# Patient Record
Sex: MALE | Race: BLACK OR AFRICAN AMERICAN | NOT HISPANIC OR LATINO | URBAN - METROPOLITAN AREA
[De-identification: names, ages, dates, MRNs, and addresses within clinical notes are randomized per-mention and may not be internally consistent; named-entity substitution may affect disease eponyms.]

---

## 2018-04-29 ENCOUNTER — EMERGENCY (EMERGENCY)
Facility: HOSPITAL | Age: 73
LOS: 1 days | Discharge: HOME | End: 2018-04-29
Attending: EMERGENCY MEDICINE | Admitting: EMERGENCY MEDICINE
Payer: MEDICARE

## 2018-04-29 VITALS
SYSTOLIC BLOOD PRESSURE: 145 MMHG | OXYGEN SATURATION: 100 % | DIASTOLIC BLOOD PRESSURE: 86 MMHG | RESPIRATION RATE: 16 BRPM | HEART RATE: 85 BPM

## 2018-04-29 VITALS — RESPIRATION RATE: 16 BRPM | TEMPERATURE: 99 F

## 2018-04-29 DIAGNOSIS — Z98.89 OTHER SPECIFIED POSTPROCEDURAL STATES: Chronic | ICD-10-CM

## 2018-04-29 PROCEDURE — 73030 X-RAY EXAM OF SHOULDER: CPT | Mod: 26,LT

## 2018-04-29 PROCEDURE — 73090 X-RAY EXAM OF FOREARM: CPT | Mod: 26,LT

## 2018-04-29 PROCEDURE — 73080 X-RAY EXAM OF ELBOW: CPT | Mod: 26,LT

## 2018-04-29 PROCEDURE — 99284 EMERGENCY DEPT VISIT MOD MDM: CPT | Mod: 25

## 2018-04-29 PROCEDURE — 93010 ELECTROCARDIOGRAM REPORT: CPT

## 2018-04-29 PROCEDURE — 73060 X-RAY EXAM OF HUMERUS: CPT | Mod: 26,LT

## 2018-04-29 RX ORDER — ACETAMINOPHEN 500 MG
650 TABLET ORAL ONCE
Qty: 0 | Refills: 0 | Status: COMPLETED | OUTPATIENT
Start: 2018-04-29 | End: 2018-04-29

## 2018-04-29 RX ORDER — IBUPROFEN 200 MG
400 TABLET ORAL ONCE
Qty: 0 | Refills: 0 | Status: COMPLETED | OUTPATIENT
Start: 2018-04-29 | End: 2018-04-29

## 2018-04-29 RX ADMIN — Medication 400 MILLIGRAM(S): at 11:27

## 2018-04-29 RX ADMIN — Medication 650 MILLIGRAM(S): at 11:27

## 2018-04-29 NOTE — ED ADULT TRIAGE NOTE - CHIEF COMPLAINT QUOTE
Pt c/o of left arm pain x 3 weeks states only has pain with movement.  Pt denies chest pain sob or back pain. PMH HTN

## 2018-04-29 NOTE — ED PROVIDER NOTE - CARE PLAN
Principal Discharge DX:	Extremity pain  Assessment and plan of treatment:	PLEASE CONTINUE TAKING PAIN MEDICATIONS PRESCRIBED TO YOU BY YOUR DOCTOR, FOLLOW UP WITH ORTHOPEDIST AT THE EARLIEST OPENING.

## 2018-04-29 NOTE — ED PROVIDER NOTE - PLAN OF CARE
PLEASE CONTINUE TAKING PAIN MEDICATIONS PRESCRIBED TO YOU BY YOUR DOCTOR, FOLLOW UP WITH ORTHOPEDIST AT THE EARLIEST OPENING.

## 2018-04-29 NOTE — ED PROVIDER NOTE - PROGRESS NOTE DETAILS
XANDER Rose RW - received pt s/p Quids eval by Dr bush - Lt UE pain upper arm) x several weeks, no cp, sob, no trauma, no numbness/weakness/neck pain, xrays and ekg pending, if neg will dc with ortho f/u. XANDER Rose - called pt's pcp's office (Dr Rosen) to enquire about ekg chnages, message left for callback. XANDER Rose - called pt's pcp's office (Dr Rosen) to enquire about ekg changes (age indeterminate T wave inversions, message left for callback. Pt advised about the EKG, has absolutely no cp, sob, will advise to f/u with pcp and provide with the copy of ekg.

## 2018-04-29 NOTE — ED PROVIDER NOTE - OBJECTIVE STATEMENT
72y M with PMHx of DM and HTN presents to the ED with left arm pain x 3-4 weeks. Pt c/o pain when moving his left arm for the past 2-3 weeks. Pt has seen his PMD for this and was given pain medication but did not have XR. Pt admits numbness in fingers at times. Did not take pain medications. No cp, no other complaints. Medications: Novolog, Losartan, Tresiba, Atenolol, Hydralazine, Cartia, Allopurinol, Tamulsin, Atorvastatin, Aspirin, and Olopatadine.

## 2018-04-29 NOTE — ED PROVIDER NOTE - MEDICAL DECISION MAKING DETAILS
72y M with left arm pain. Possibly musculoskeletal strain vs radiculopathy. XRs, pain medication, and outpatient follow up with PMD.

## 2019-10-06 NOTE — ED PROVIDER NOTE - NS ED ATTENDING STATEMENT MOD
Radial band applied to the right radial artery. 13cc's air in band  I have personally performed a face to face diagnostic evaluation on this patient. I have reviewed the ACP note and agree with the history, exam and plan of care, except as noted.

## 2020-09-19 ENCOUNTER — EMERGENCY (EMERGENCY)
Facility: HOSPITAL | Age: 75
LOS: 1 days | Discharge: HOME | End: 2020-09-19
Attending: EMERGENCY MEDICINE | Admitting: EMERGENCY MEDICINE
Payer: MEDICARE

## 2020-09-19 VITALS
TEMPERATURE: 98 F | HEART RATE: 85 BPM | DIASTOLIC BLOOD PRESSURE: 107 MMHG | OXYGEN SATURATION: 100 % | RESPIRATION RATE: 18 BRPM | SYSTOLIC BLOOD PRESSURE: 199 MMHG

## 2020-09-19 DIAGNOSIS — Z98.89 OTHER SPECIFIED POSTPROCEDURAL STATES: Chronic | ICD-10-CM

## 2020-09-19 LAB
ANION GAP SERPL CALC-SCNC: 16 MMO/L — HIGH (ref 7–14)
APPEARANCE UR: CLEAR — SIGNIFICANT CHANGE UP
BACTERIA # UR AUTO: NEGATIVE — SIGNIFICANT CHANGE UP
BASOPHILS # BLD AUTO: 0.03 K/UL — SIGNIFICANT CHANGE UP (ref 0–0.2)
BASOPHILS NFR BLD AUTO: 0.6 % — SIGNIFICANT CHANGE UP (ref 0–2)
BILIRUB UR-MCNC: NEGATIVE — SIGNIFICANT CHANGE UP
BLOOD UR QL VISUAL: NEGATIVE — SIGNIFICANT CHANGE UP
BUN SERPL-MCNC: 39 MG/DL — HIGH (ref 7–23)
CALCIUM SERPL-MCNC: 9.8 MG/DL — SIGNIFICANT CHANGE UP (ref 8.4–10.5)
CHLORIDE SERPL-SCNC: 102 MMOL/L — SIGNIFICANT CHANGE UP (ref 98–107)
CO2 SERPL-SCNC: 21 MMOL/L — LOW (ref 22–31)
COLOR SPEC: SIGNIFICANT CHANGE UP
CREAT SERPL-MCNC: 2.13 MG/DL — HIGH (ref 0.5–1.3)
EOSINOPHIL # BLD AUTO: 0.05 K/UL — SIGNIFICANT CHANGE UP (ref 0–0.5)
EOSINOPHIL NFR BLD AUTO: 1 % — SIGNIFICANT CHANGE UP (ref 0–6)
GLUCOSE SERPL-MCNC: 160 MG/DL — HIGH (ref 70–99)
GLUCOSE UR-MCNC: NEGATIVE — SIGNIFICANT CHANGE UP
HBA1C BLD-MCNC: 8.3 % — HIGH (ref 4–5.6)
HCT VFR BLD CALC: 38.3 % — LOW (ref 39–50)
HGB BLD-MCNC: 11.9 G/DL — LOW (ref 13–17)
HYALINE CASTS # UR AUTO: NEGATIVE — SIGNIFICANT CHANGE UP
IMM GRANULOCYTES NFR BLD AUTO: 0.4 % — SIGNIFICANT CHANGE UP (ref 0–1.5)
KETONES UR-MCNC: NEGATIVE — SIGNIFICANT CHANGE UP
LEUKOCYTE ESTERASE UR-ACNC: NEGATIVE — SIGNIFICANT CHANGE UP
LYMPHOCYTES # BLD AUTO: 0.89 K/UL — LOW (ref 1–3.3)
LYMPHOCYTES # BLD AUTO: 18 % — SIGNIFICANT CHANGE UP (ref 13–44)
MCHC RBC-ENTMCNC: 27.2 PG — SIGNIFICANT CHANGE UP (ref 27–34)
MCHC RBC-ENTMCNC: 31.1 % — LOW (ref 32–36)
MCV RBC AUTO: 87.4 FL — SIGNIFICANT CHANGE UP (ref 80–100)
MONOCYTES # BLD AUTO: 0.4 K/UL — SIGNIFICANT CHANGE UP (ref 0–0.9)
MONOCYTES NFR BLD AUTO: 8.1 % — SIGNIFICANT CHANGE UP (ref 2–14)
NEUTROPHILS # BLD AUTO: 3.56 K/UL — SIGNIFICANT CHANGE UP (ref 1.8–7.4)
NEUTROPHILS NFR BLD AUTO: 71.9 % — SIGNIFICANT CHANGE UP (ref 43–77)
NITRITE UR-MCNC: NEGATIVE — SIGNIFICANT CHANGE UP
NRBC # FLD: 0 K/UL — SIGNIFICANT CHANGE UP (ref 0–0)
PH UR: 6 — SIGNIFICANT CHANGE UP (ref 5–8)
PLATELET # BLD AUTO: 253 K/UL — SIGNIFICANT CHANGE UP (ref 150–400)
PMV BLD: 11.2 FL — SIGNIFICANT CHANGE UP (ref 7–13)
POTASSIUM SERPL-MCNC: 4.4 MMOL/L — SIGNIFICANT CHANGE UP (ref 3.5–5.3)
POTASSIUM SERPL-SCNC: 4.4 MMOL/L — SIGNIFICANT CHANGE UP (ref 3.5–5.3)
PROT UR-MCNC: 200 — HIGH
RBC # BLD: 4.38 M/UL — SIGNIFICANT CHANGE UP (ref 4.2–5.8)
RBC # FLD: 13.5 % — SIGNIFICANT CHANGE UP (ref 10.3–14.5)
RBC CASTS # UR COMP ASSIST: SIGNIFICANT CHANGE UP (ref 0–?)
SARS-COV-2 RNA SPEC QL NAA+PROBE: SIGNIFICANT CHANGE UP
SODIUM SERPL-SCNC: 139 MMOL/L — SIGNIFICANT CHANGE UP (ref 135–145)
SP GR SPEC: 1.02 — SIGNIFICANT CHANGE UP (ref 1–1.04)
SQUAMOUS # UR AUTO: SIGNIFICANT CHANGE UP
UROBILINOGEN FLD QL: NORMAL — SIGNIFICANT CHANGE UP
WBC # BLD: 4.95 K/UL — SIGNIFICANT CHANGE UP (ref 3.8–10.5)
WBC # FLD AUTO: 4.95 K/UL — SIGNIFICANT CHANGE UP (ref 3.8–10.5)
WBC UR QL: SIGNIFICANT CHANGE UP (ref 0–?)

## 2020-09-19 PROCEDURE — 99218: CPT

## 2020-09-19 RX ORDER — GEMFIBROZIL 600 MG
600 TABLET ORAL
Refills: 0 | Status: DISCONTINUED | OUTPATIENT
Start: 2020-09-19 | End: 2020-09-22

## 2020-09-19 RX ORDER — LABETALOL HCL 100 MG
10 TABLET ORAL ONCE
Refills: 0 | Status: COMPLETED | OUTPATIENT
Start: 2020-09-19 | End: 2020-09-19

## 2020-09-19 RX ORDER — INSULIN LISPRO 100/ML
6 VIAL (ML) SUBCUTANEOUS
Refills: 0 | Status: DISCONTINUED | OUTPATIENT
Start: 2020-09-19 | End: 2020-09-22

## 2020-09-19 RX ORDER — SODIUM CHLORIDE 9 MG/ML
1000 INJECTION INTRAMUSCULAR; INTRAVENOUS; SUBCUTANEOUS
Refills: 0 | Status: DISCONTINUED | OUTPATIENT
Start: 2020-09-19 | End: 2020-09-22

## 2020-09-19 RX ORDER — CIPROFLOXACIN LACTATE 400MG/40ML
250 VIAL (ML) INTRAVENOUS DAILY
Refills: 0 | Status: DISCONTINUED | OUTPATIENT
Start: 2020-09-19 | End: 2020-09-22

## 2020-09-19 RX ORDER — ATORVASTATIN CALCIUM 80 MG/1
10 TABLET, FILM COATED ORAL AT BEDTIME
Refills: 0 | Status: DISCONTINUED | OUTPATIENT
Start: 2020-09-19 | End: 2020-09-22

## 2020-09-19 RX ORDER — INSULIN GLARGINE 100 [IU]/ML
20 INJECTION, SOLUTION SUBCUTANEOUS AT BEDTIME
Refills: 0 | Status: DISCONTINUED | OUTPATIENT
Start: 2020-09-19 | End: 2020-09-22

## 2020-09-19 RX ORDER — INSULIN LISPRO 100/ML
VIAL (ML) SUBCUTANEOUS AT BEDTIME
Refills: 0 | Status: DISCONTINUED | OUTPATIENT
Start: 2020-09-19 | End: 2020-09-22

## 2020-09-19 RX ORDER — LISINOPRIL 2.5 MG/1
40 TABLET ORAL DAILY
Refills: 0 | Status: DISCONTINUED | OUTPATIENT
Start: 2020-09-19 | End: 2020-09-22

## 2020-09-19 RX ORDER — DEXTROSE 50 % IN WATER 50 %
25 SYRINGE (ML) INTRAVENOUS ONCE
Refills: 0 | Status: DISCONTINUED | OUTPATIENT
Start: 2020-09-19 | End: 2020-09-22

## 2020-09-19 RX ORDER — HYDRALAZINE HCL 50 MG
100 TABLET ORAL DAILY
Refills: 0 | Status: DISCONTINUED | OUTPATIENT
Start: 2020-09-19 | End: 2020-09-22

## 2020-09-19 RX ORDER — INSULIN GLARGINE 100 [IU]/ML
80 INJECTION, SOLUTION SUBCUTANEOUS AT BEDTIME
Refills: 0 | Status: DISCONTINUED | OUTPATIENT
Start: 2020-09-19 | End: 2020-09-19

## 2020-09-19 RX ORDER — ALLOPURINOL 300 MG
100 TABLET ORAL DAILY
Refills: 0 | Status: DISCONTINUED | OUTPATIENT
Start: 2020-09-19 | End: 2020-09-22

## 2020-09-19 RX ORDER — SODIUM CHLORIDE 9 MG/ML
1000 INJECTION INTRAMUSCULAR; INTRAVENOUS; SUBCUTANEOUS
Refills: 0 | Status: DISCONTINUED | OUTPATIENT
Start: 2020-09-19 | End: 2020-09-19

## 2020-09-19 RX ORDER — ATENOLOL 25 MG/1
50 TABLET ORAL DAILY
Refills: 0 | Status: DISCONTINUED | OUTPATIENT
Start: 2020-09-19 | End: 2020-09-22

## 2020-09-19 RX ORDER — GLUCAGON INJECTION, SOLUTION 0.5 MG/.1ML
1 INJECTION, SOLUTION SUBCUTANEOUS ONCE
Refills: 0 | Status: DISCONTINUED | OUTPATIENT
Start: 2020-09-19 | End: 2020-09-22

## 2020-09-19 RX ORDER — SODIUM CHLORIDE 9 MG/ML
1000 INJECTION, SOLUTION INTRAVENOUS
Refills: 0 | Status: DISCONTINUED | OUTPATIENT
Start: 2020-09-19 | End: 2020-09-22

## 2020-09-19 RX ORDER — AMLODIPINE BESYLATE 2.5 MG/1
10 TABLET ORAL DAILY
Refills: 0 | Status: DISCONTINUED | OUTPATIENT
Start: 2020-09-19 | End: 2020-09-22

## 2020-09-19 RX ORDER — SODIUM CHLORIDE 9 MG/ML
1000 INJECTION INTRAMUSCULAR; INTRAVENOUS; SUBCUTANEOUS ONCE
Refills: 0 | Status: COMPLETED | OUTPATIENT
Start: 2020-09-19 | End: 2020-09-19

## 2020-09-19 RX ORDER — DEXTROSE 50 % IN WATER 50 %
15 SYRINGE (ML) INTRAVENOUS ONCE
Refills: 0 | Status: DISCONTINUED | OUTPATIENT
Start: 2020-09-19 | End: 2020-09-22

## 2020-09-19 RX ORDER — CIPROFLOXACIN LACTATE 400MG/40ML
500 VIAL (ML) INTRAVENOUS EVERY 12 HOURS
Refills: 0 | Status: DISCONTINUED | OUTPATIENT
Start: 2020-09-19 | End: 2020-09-19

## 2020-09-19 RX ORDER — DEXTROSE 50 % IN WATER 50 %
12.5 SYRINGE (ML) INTRAVENOUS ONCE
Refills: 0 | Status: DISCONTINUED | OUTPATIENT
Start: 2020-09-19 | End: 2020-09-22

## 2020-09-19 RX ORDER — INSULIN LISPRO 100/ML
VIAL (ML) SUBCUTANEOUS
Refills: 0 | Status: DISCONTINUED | OUTPATIENT
Start: 2020-09-19 | End: 2020-09-22

## 2020-09-19 RX ADMIN — ATENOLOL 50 MILLIGRAM(S): 25 TABLET ORAL at 14:04

## 2020-09-19 RX ADMIN — Medication 250 MILLIGRAM(S): at 18:26

## 2020-09-19 RX ADMIN — ATORVASTATIN CALCIUM 10 MILLIGRAM(S): 80 TABLET, FILM COATED ORAL at 21:20

## 2020-09-19 RX ADMIN — SODIUM CHLORIDE 100 MILLILITER(S): 9 INJECTION INTRAMUSCULAR; INTRAVENOUS; SUBCUTANEOUS at 21:08

## 2020-09-19 RX ADMIN — INSULIN GLARGINE 20 UNIT(S): 100 INJECTION, SOLUTION SUBCUTANEOUS at 21:20

## 2020-09-19 RX ADMIN — AMLODIPINE BESYLATE 10 MILLIGRAM(S): 2.5 TABLET ORAL at 14:03

## 2020-09-19 RX ADMIN — SODIUM CHLORIDE 1000 MILLILITER(S): 9 INJECTION INTRAMUSCULAR; INTRAVENOUS; SUBCUTANEOUS at 12:25

## 2020-09-19 RX ADMIN — Medication 100 MILLIGRAM(S): at 14:03

## 2020-09-19 RX ADMIN — Medication 600 MILLIGRAM(S): at 18:26

## 2020-09-19 RX ADMIN — SODIUM CHLORIDE 125 MILLILITER(S): 9 INJECTION INTRAMUSCULAR; INTRAVENOUS; SUBCUTANEOUS at 14:03

## 2020-09-19 RX ADMIN — SODIUM CHLORIDE 1000 MILLILITER(S): 9 INJECTION INTRAMUSCULAR; INTRAVENOUS; SUBCUTANEOUS at 14:07

## 2020-09-19 RX ADMIN — LISINOPRIL 40 MILLIGRAM(S): 2.5 TABLET ORAL at 14:03

## 2020-09-19 RX ADMIN — Medication 10 MILLIGRAM(S): at 16:23

## 2020-09-19 NOTE — ED CDU PROVIDER INITIAL DAY NOTE - MEDICAL DECISION MAKING DETAILS
76 y/o male with pmhx of DM on insulin, HTN, presents to ED s/p REZUM procedure on 9/15 4 days ago c/o urinary retention x 1 day. States he had mane placed after procedure and his urologist removed it in office yesterday. Pt was able to urinate a small amount afterwards in office. Pt states he was not able to urinate for last 24 hours and presented to ED w/ abd pain and distention. Pt had new mane placed in ED and is now feeling much better. No fever, chills, hematuria. pt well appearing, mane draining yellow urine. found with RADHA mansfield due to obstructive uropathy- plan to monitor in cdu, c/w fluids and repeat labs in morning

## 2020-09-19 NOTE — ED ADULT NURSE REASSESSMENT NOTE - NS ED NURSE REASSESS COMMENT FT1
PT received per day shift RN, PT AOx4, ambulatory at baseline. pt has a mane in place draining well. Fluids running. pt v/s as noted. pt denies headache, chest pain, SOB, NVD, blurred vision. pt breathing equal and unlabored on room air, sating at 100%. Safety measures in place. will continue to monitor.

## 2020-09-19 NOTE — ED ADULT NURSE NOTE - OBJECTIVE STATEMENT
pt received to room #17 with c/o urinary rentention x 24 hrs. pt is s/o "prostate procedure" 4 days ago where he had mane placed. had Mane removed yesterday and was unable to urinate since. pt aox4, ambulatory, skin w/d/i. #16 crude placed with clear yellow urine draining to gravity using sterile technique. IV placed, labs drawn and sent, seen by MD. will cont to monitor.

## 2020-09-19 NOTE — ED CDU PROVIDER INITIAL DAY NOTE - ATTENDING CONTRIBUTION TO CARE
Attending Statement: I have reviewed and agree with all pertinent clinical information, including history and physical exam and agree with treatment plan of the PA, except as noted.  76yo M hx of IDDM, HTN, Gout, hx BPH, sp REZUM procedure on 9-15-20 ( 4 days ago) presented to ED co urinary retention.  Unable to void x 24 hours before ED visit.  Pt was uncomfortable before mane, now "feels good" with mane. No distress. no abdominal pain no n/v no fever/chills.    Vital signs noted. laying in bed, nontoxic appearing. mmm. no work of breathing. soft obese male nt abdomen w mane, clear yellow urine in bag. AO3  plan Labs, IVF, recheck creatinine in am, BP monitor, contact pt urologist. pt agreeable to plan.

## 2020-09-19 NOTE — ED PROVIDER NOTE - OBJECTIVE STATEMENT
74 y/o male pmh PBH s/p REZUM x5 days ago, c/o urinary retention x1 day. Pt had mane removed x 24 hours ago and has not been able to urinate since. Pt admits to lower abdominal bloating. Denies chest pain, sob, n/v/d, numbness, tingling, weakness, dizziness, syncope, fever or chills.

## 2020-09-19 NOTE — ED CDU PROVIDER INITIAL DAY NOTE - PROGRESS NOTE DETAILS
Left message for outpt urologist Dr. Daley at [phone] Dr. Daley amenable w/plan. will have pt c/w cipro 500mg bid as prophylaxis in cdu pt has already been taking at home. pt is a poor historian, I spoke with his pharmacy regarding his home medications, pharmacist Amanda at [facility] [phone] pt eating, no distress. no abdominal pain. mane clear yellow urine. pending am labs and bp recheck. Endorse to night team

## 2020-09-19 NOTE — ED CDU PROVIDER INITIAL DAY NOTE - OBJECTIVE STATEMENT
76 y/o male with pmhx of DM on insulin, HTN, presents to ED s/p REZUM procedure on 9/15 4 days ago c/o urinary retention x 1 day. States he had mane placed after procedure and his urologist removed it in office yesterday. Pt was able to urinate a small amount afterwards in office. Pt states he was unable to urinate for last 24 hours and presented to ED w/ abd pain and distention. Pt had new mane placed in ED and is now feeling much better. No fever, chills, hematuria, abd pain, n/v/d, dysuria. 74 y/o male with pmhx of DM on insulin, HTN, presents to ED s/p REZUM procedure on 9/15 4 days ago c/o urinary retention x 1 day. States he had mane placed after procedure and his urologist removed it in office yesterday. Pt was able to urinate a small amount afterwards in office. Pt states he was not able to urinate for last 24 hours and presented to ED w/ abd pain and distention. Pt had new mane placed in ED and is now feeling much better. No fever, chills, hematuria, abd pain, n/v/d, dysuria.

## 2020-09-19 NOTE — ED PROVIDER NOTE - NSFOLLOWUPINSTRUCTIONS_ED_ALL_ED_FT
Urinary Retention    Urinary retention is the inability to completely empty your bladder. This is a common problem in older men, especially with enlarged prostates. If you are sent home with a mane catheter and a drainage system make sure to keep the drainage bag emptied and lower than your catheter. Keep the mane catheter in until you follow up with a urologist.    SEEK IMMEDIATE MEDICAL CARE IF YOU DEVELOP THE FOLLOWING SYMPTOMS: the catheter stops draining urine, the catheter falls out, abdominal pain, nausea/vomiting, or chills/fever.

## 2020-09-19 NOTE — ED PROVIDER NOTE - ATTENDING CONTRIBUTION TO CARE
MD Montejo:  patient seen and evaluated with the PA.  I was present for key portions of the History and Physical, and I agree with the Impression and Plan.    MD Montejo:  75 yo M c/o inability to urinate x24 hrs.  Rezum prostate procedure 5d ago, mane removed in Uro clinic 24 hrs ago, and has been unable to urinate since then.   Assoc Sx: no f/c, no n/v/d.  VS: wnl.  Gen: uncomfortable appearing   Head: NC/AT  Neck: trachea midline  Resp:  No distress  Ext: no deformities  Neuro:  A&O appears non focal  Skin:  Warm and dry as visualized  Psych:  Normal affect and mood  Abd:  distended lower abdomen that is TTP.  Impression urinary obstruction x 24 hrs.  Plan:  mane catheter placed w/o issue.  300c out.  cbc, bmp, ua, ucx

## 2020-09-19 NOTE — ED ADULT TRIAGE NOTE - CHIEF COMPLAINT QUOTE
Pt had a procedure done on 9/15, and had mane cath removed yesterday. Pt has been unable to urinate since yesterday. C/o pelvic pain. Hx: BPH, HTN, DM

## 2020-09-19 NOTE — ED PROVIDER NOTE - OBSERVING MD:
Detail Level: Zone Quality 226: Preventive Care And Screening: Tobacco Use: Screening And Cessation Intervention: Patient screened for tobacco and never smoked Quality 131: Pain Assessment And Follow-Up: Pain assessment using a standardized tool is documented as negative, no follow-up plan required Quality 110: Preventive Care And Screening: Influenza Immunization: Influenza Immunization Ordered or Recommended, but not Administered due to system reason Quality 431: Preventive Care And Screening: Unhealthy Alcohol Use - Screening: Patient screened for unhealthy alcohol use using a single question and scores less than 2 times per year Dr. Chung

## 2020-09-19 NOTE — ED PROVIDER NOTE - CARE PLAN
Principal Discharge DX:	Urinary retention   Principal Discharge DX:	Urinary retention  Secondary Diagnosis:	RADHA (acute kidney injury)

## 2020-09-20 VITALS
HEART RATE: 72 BPM | SYSTOLIC BLOOD PRESSURE: 162 MMHG | OXYGEN SATURATION: 100 % | DIASTOLIC BLOOD PRESSURE: 76 MMHG | RESPIRATION RATE: 16 BRPM | TEMPERATURE: 98 F

## 2020-09-20 LAB
ALBUMIN SERPL ELPH-MCNC: 3.6 G/DL — SIGNIFICANT CHANGE UP (ref 3.3–5)
ALP SERPL-CCNC: 68 U/L — SIGNIFICANT CHANGE UP (ref 40–120)
ALT FLD-CCNC: 10 U/L — SIGNIFICANT CHANGE UP (ref 4–41)
ANION GAP SERPL CALC-SCNC: 15 MMO/L — HIGH (ref 7–14)
AST SERPL-CCNC: 15 U/L — SIGNIFICANT CHANGE UP (ref 4–40)
BASOPHILS # BLD AUTO: 0.02 K/UL — SIGNIFICANT CHANGE UP (ref 0–0.2)
BASOPHILS NFR BLD AUTO: 0.4 % — SIGNIFICANT CHANGE UP (ref 0–2)
BILIRUB SERPL-MCNC: 0.2 MG/DL — SIGNIFICANT CHANGE UP (ref 0.2–1.2)
BUN SERPL-MCNC: 30 MG/DL — HIGH (ref 7–23)
CALCIUM SERPL-MCNC: 9 MG/DL — SIGNIFICANT CHANGE UP (ref 8.4–10.5)
CHLORIDE SERPL-SCNC: 105 MMOL/L — SIGNIFICANT CHANGE UP (ref 98–107)
CO2 SERPL-SCNC: 21 MMOL/L — LOW (ref 22–31)
CREAT SERPL-MCNC: 1.84 MG/DL — HIGH (ref 0.5–1.3)
CULTURE RESULTS: NO GROWTH — SIGNIFICANT CHANGE UP
EOSINOPHIL # BLD AUTO: 0.11 K/UL — SIGNIFICANT CHANGE UP (ref 0–0.5)
EOSINOPHIL NFR BLD AUTO: 2.3 % — SIGNIFICANT CHANGE UP (ref 0–6)
GLUCOSE SERPL-MCNC: 115 MG/DL — HIGH (ref 70–99)
HCT VFR BLD CALC: 35.7 % — LOW (ref 39–50)
HGB BLD-MCNC: 10.8 G/DL — LOW (ref 13–17)
IMM GRANULOCYTES NFR BLD AUTO: 0.2 % — SIGNIFICANT CHANGE UP (ref 0–1.5)
LYMPHOCYTES # BLD AUTO: 1.27 K/UL — SIGNIFICANT CHANGE UP (ref 1–3.3)
LYMPHOCYTES # BLD AUTO: 26.9 % — SIGNIFICANT CHANGE UP (ref 13–44)
MCHC RBC-ENTMCNC: 26.5 PG — LOW (ref 27–34)
MCHC RBC-ENTMCNC: 30.3 % — LOW (ref 32–36)
MCV RBC AUTO: 87.5 FL — SIGNIFICANT CHANGE UP (ref 80–100)
MONOCYTES # BLD AUTO: 0.51 K/UL — SIGNIFICANT CHANGE UP (ref 0–0.9)
MONOCYTES NFR BLD AUTO: 10.8 % — SIGNIFICANT CHANGE UP (ref 2–14)
NEUTROPHILS # BLD AUTO: 2.8 K/UL — SIGNIFICANT CHANGE UP (ref 1.8–7.4)
NEUTROPHILS NFR BLD AUTO: 59.4 % — SIGNIFICANT CHANGE UP (ref 43–77)
NRBC # FLD: 0 K/UL — SIGNIFICANT CHANGE UP (ref 0–0)
PLATELET # BLD AUTO: 243 K/UL — SIGNIFICANT CHANGE UP (ref 150–400)
PMV BLD: 11.9 FL — SIGNIFICANT CHANGE UP (ref 7–13)
POTASSIUM SERPL-MCNC: 4.2 MMOL/L — SIGNIFICANT CHANGE UP (ref 3.5–5.3)
POTASSIUM SERPL-SCNC: 4.2 MMOL/L — SIGNIFICANT CHANGE UP (ref 3.5–5.3)
PROT SERPL-MCNC: 6.6 G/DL — SIGNIFICANT CHANGE UP (ref 6–8.3)
RBC # BLD: 4.08 M/UL — LOW (ref 4.2–5.8)
RBC # FLD: 13.6 % — SIGNIFICANT CHANGE UP (ref 10.3–14.5)
SODIUM SERPL-SCNC: 141 MMOL/L — SIGNIFICANT CHANGE UP (ref 135–145)
SPECIMEN SOURCE: SIGNIFICANT CHANGE UP
WBC # BLD: 4.72 K/UL — SIGNIFICANT CHANGE UP (ref 3.8–10.5)
WBC # FLD AUTO: 4.72 K/UL — SIGNIFICANT CHANGE UP (ref 3.8–10.5)

## 2020-09-20 PROCEDURE — 99217: CPT

## 2020-09-20 RX ADMIN — Medication 100 MILLIGRAM(S): at 05:34

## 2020-09-20 RX ADMIN — SODIUM CHLORIDE 1000 MILLILITER(S): 9 INJECTION INTRAMUSCULAR; INTRAVENOUS; SUBCUTANEOUS at 11:02

## 2020-09-20 RX ADMIN — Medication 600 MILLIGRAM(S): at 05:34

## 2020-09-20 RX ADMIN — LISINOPRIL 40 MILLIGRAM(S): 2.5 TABLET ORAL at 05:34

## 2020-09-20 RX ADMIN — AMLODIPINE BESYLATE 10 MILLIGRAM(S): 2.5 TABLET ORAL at 05:35

## 2020-09-20 RX ADMIN — ATENOLOL 50 MILLIGRAM(S): 25 TABLET ORAL at 05:35

## 2020-09-20 NOTE — ED CDU PROVIDER DISPOSITION NOTE - NSFOLLOWUPINSTRUCTIONS_ED_ALL_ED_FT
Follow up with your urologist within 3 days to have Dobbs catheter removed  Continue taking medications as previously prescribed to you  Return to the ER with any worsening or concerning symptoms, pain, fever, blood/clots in your urine or any other concerns.

## 2020-09-20 NOTE — ED CDU PROVIDER SUBSEQUENT DAY NOTE - PROGRESS NOTE DETAILS
Pt signed out me by XANDER Duran: pt with urinary retention following prostate procedure. Dobbs placed, pt in CDU for RADHA, rpt labs this morning show downtrending Creatinine. Will d/c home with urology follow up within 3 days for trial of void. Pt agrees with this plan

## 2020-09-20 NOTE — ED CDU PROVIDER DISPOSITION NOTE - PATIENT PORTAL LINK FT
You can access the FollowMyHealth Patient Portal offered by [facility] by registering at the following website: [email or web address]. By joining [employer]’s FollowMyHealth portal, you will also be able to view your health information using other applications (apps) compatible with our system.

## 2020-09-20 NOTE — ED CDU PROVIDER SUBSEQUENT DAY NOTE - HISTORY
74 yo male, PMH DM, HTN, gout, BPH; pt presented to the ED s/p hx/o REZUM procedure 9/15/20, with new onset urinary retention.  Pt had Dobbs after the procedure which was removed by outpatient urologist Dr. Daley on 9/18/20 with small volume void in office as per pt; pt later was unable to void and presented to the ED.  On ED evaluation, WBC 4.95, Hb 11.9, BUN 39, creatinine 2.13, anion gap 16.  Dobbs was placed; UA: 200 protein, negative blood (3-5 RBC), negative leuk/nitrite (0-2 WBC).  UCX was sent.  Pt. was dispo'd to CDU for continued care plan:  IV hydration, AM labs to trend BUN/Cr., general observation care / monitoring.  CDU day team spoke with Dr. Daley 9/19 daytime; Dr. Daley had informed that pt is on Cipro for prophylactic purposes due to recent procedure noted.  In the interim, pt objectively noted to be resting comfortably; no issues thus far.  AM labs are ordered.

## 2020-09-20 NOTE — ED CDU PROVIDER DISPOSITION NOTE - CLINICAL COURSE
75M c/o unable to urinate with lower abd pain after Dobbs cath removed the day prior. Initial U.O. 600cc with improvement in sx. Pt. also noted to have elev. Cr., sent to CDU to monitor urine output and eval for RADHA. This a.m. pt. NAD, feels well, repeat labs with improving Cr., and pt. eleni po intake. Has urology FU, already takes tamsulosin and can easily make appt. for catheter removal, dc home.

## 2020-09-20 NOTE — ED CDU PROVIDER SUBSEQUENT DAY NOTE - PHYSICAL EXAMINATION
CONSTITUTIONAL:  Well appearing, awake, alert, oriented to person, place, time/situation and in no apparent distress.  Pt. is objectively comfortable appearing and verbalizing in full, clear, effortless sentences.  ENMT: NC/AT.  Airway patent.  Nasal mucosa clear.  Moist mucous membranes.  Neck supple.  EYES:  Clear OU.  CARDIAC:  Normal rate, regular rhythm.  Heart sounds S1 S2.  No murmurs, gallops, or rubs.  RESPIRATORY:  Breath sounds clear and equal bilaterally.  No wheezes, no rales, no rhonchi.  GASTROINTESTINAL:  Abdomen soft, non-distended, non-tender.  No rebound, no guarding.  MUSCULOSKELETAL:  Range of motion is not limited.    SKIN:  Skin color unremarkable.  Skin warm, dry, and intact.    PSYCHIATRIC:  Alert and oriented to person/place/time/situation.  Mood and affect WNL.  No apparent risk to self or others.

## 2020-11-06 NOTE — ED ADULT TRIAGE NOTE - NS ED NURSE BANDS TYPE
Name band; Detail Level: Detailed Quality 431: Preventive Care And Screening: Unhealthy Alcohol Use - Screening: Patient screened for unhealthy alcohol use using a single question and scores less than 2 times per year Quality 226: Preventive Care And Screening: Tobacco Use: Screening And Cessation Intervention: Patient screened for tobacco use and is an ex/non-smoker

## 2021-07-05 ENCOUNTER — EMERGENCY (EMERGENCY)
Facility: HOSPITAL | Age: 76
LOS: 1 days | Discharge: HOME | End: 2021-07-05
Attending: STUDENT IN AN ORGANIZED HEALTH CARE EDUCATION/TRAINING PROGRAM | Admitting: STUDENT IN AN ORGANIZED HEALTH CARE EDUCATION/TRAINING PROGRAM
Payer: MEDICARE

## 2021-07-05 VITALS
HEIGHT: 69 IN | DIASTOLIC BLOOD PRESSURE: 64 MMHG | OXYGEN SATURATION: 100 % | RESPIRATION RATE: 16 BRPM | HEART RATE: 70 BPM | TEMPERATURE: 99 F | SYSTOLIC BLOOD PRESSURE: 174 MMHG | WEIGHT: 205.03 LBS

## 2021-07-05 DIAGNOSIS — Z98.89 OTHER SPECIFIED POSTPROCEDURAL STATES: Chronic | ICD-10-CM

## 2021-07-05 LAB
ALBUMIN SERPL ELPH-MCNC: 3.8 G/DL — SIGNIFICANT CHANGE UP (ref 3.3–5)
ALP SERPL-CCNC: 68 U/L — SIGNIFICANT CHANGE UP (ref 40–120)
ALT FLD-CCNC: 12 U/L — SIGNIFICANT CHANGE UP (ref 4–41)
ANION GAP SERPL CALC-SCNC: 11 MMOL/L — SIGNIFICANT CHANGE UP (ref 7–14)
ANION GAP SERPL CALC-SCNC: 12 MMOL/L — SIGNIFICANT CHANGE UP (ref 7–14)
AST SERPL-CCNC: 15 U/L — SIGNIFICANT CHANGE UP (ref 4–40)
BASOPHILS # BLD AUTO: 0.04 K/UL — SIGNIFICANT CHANGE UP (ref 0–0.2)
BASOPHILS NFR BLD AUTO: 0.8 % — SIGNIFICANT CHANGE UP (ref 0–2)
BILIRUB SERPL-MCNC: 0.2 MG/DL — SIGNIFICANT CHANGE UP (ref 0.2–1.2)
BUN SERPL-MCNC: 29 MG/DL — HIGH (ref 7–23)
BUN SERPL-MCNC: 31 MG/DL — HIGH (ref 7–23)
CALCIUM SERPL-MCNC: 9.1 MG/DL — SIGNIFICANT CHANGE UP (ref 8.4–10.5)
CALCIUM SERPL-MCNC: 9.8 MG/DL — SIGNIFICANT CHANGE UP (ref 8.4–10.5)
CHLORIDE SERPL-SCNC: 101 MMOL/L — SIGNIFICANT CHANGE UP (ref 98–107)
CHLORIDE SERPL-SCNC: 105 MMOL/L — SIGNIFICANT CHANGE UP (ref 98–107)
CO2 SERPL-SCNC: 24 MMOL/L — SIGNIFICANT CHANGE UP (ref 22–31)
CO2 SERPL-SCNC: 25 MMOL/L — SIGNIFICANT CHANGE UP (ref 22–31)
CREAT SERPL-MCNC: 2.02 MG/DL — HIGH (ref 0.5–1.3)
CREAT SERPL-MCNC: 2.3 MG/DL — HIGH (ref 0.5–1.3)
EOSINOPHIL # BLD AUTO: 0.09 K/UL — SIGNIFICANT CHANGE UP (ref 0–0.5)
EOSINOPHIL NFR BLD AUTO: 1.7 % — SIGNIFICANT CHANGE UP (ref 0–6)
GLUCOSE SERPL-MCNC: 204 MG/DL — HIGH (ref 70–99)
GLUCOSE SERPL-MCNC: 205 MG/DL — HIGH (ref 70–99)
HCT VFR BLD CALC: 35.7 % — LOW (ref 39–50)
HGB BLD-MCNC: 11.5 G/DL — LOW (ref 13–17)
IANC: 3.3 K/UL — SIGNIFICANT CHANGE UP (ref 1.5–8.5)
IMM GRANULOCYTES NFR BLD AUTO: 0.2 % — SIGNIFICANT CHANGE UP (ref 0–1.5)
LYMPHOCYTES # BLD AUTO: 1.31 K/UL — SIGNIFICANT CHANGE UP (ref 1–3.3)
LYMPHOCYTES # BLD AUTO: 25.1 % — SIGNIFICANT CHANGE UP (ref 13–44)
MCHC RBC-ENTMCNC: 26.7 PG — LOW (ref 27–34)
MCHC RBC-ENTMCNC: 32.2 GM/DL — SIGNIFICANT CHANGE UP (ref 32–36)
MCV RBC AUTO: 82.8 FL — SIGNIFICANT CHANGE UP (ref 80–100)
MONOCYTES # BLD AUTO: 0.46 K/UL — SIGNIFICANT CHANGE UP (ref 0–0.9)
MONOCYTES NFR BLD AUTO: 8.8 % — SIGNIFICANT CHANGE UP (ref 2–14)
NEUTROPHILS # BLD AUTO: 3.3 K/UL — SIGNIFICANT CHANGE UP (ref 1.8–7.4)
NEUTROPHILS NFR BLD AUTO: 63.4 % — SIGNIFICANT CHANGE UP (ref 43–77)
NRBC # BLD: 0 /100 WBCS — SIGNIFICANT CHANGE UP
NRBC # FLD: 0 K/UL — SIGNIFICANT CHANGE UP
PLATELET # BLD AUTO: 226 K/UL — SIGNIFICANT CHANGE UP (ref 150–400)
POTASSIUM SERPL-MCNC: 4.5 MMOL/L — SIGNIFICANT CHANGE UP (ref 3.5–5.3)
POTASSIUM SERPL-MCNC: 4.5 MMOL/L — SIGNIFICANT CHANGE UP (ref 3.5–5.3)
POTASSIUM SERPL-SCNC: 4.5 MMOL/L — SIGNIFICANT CHANGE UP (ref 3.5–5.3)
POTASSIUM SERPL-SCNC: 4.5 MMOL/L — SIGNIFICANT CHANGE UP (ref 3.5–5.3)
PROT SERPL-MCNC: 6.8 G/DL — SIGNIFICANT CHANGE UP (ref 6–8.3)
RBC # BLD: 4.31 M/UL — SIGNIFICANT CHANGE UP (ref 4.2–5.8)
RBC # FLD: 14 % — SIGNIFICANT CHANGE UP (ref 10.3–14.5)
SARS-COV-2 RNA SPEC QL NAA+PROBE: SIGNIFICANT CHANGE UP
SODIUM SERPL-SCNC: 137 MMOL/L — SIGNIFICANT CHANGE UP (ref 135–145)
SODIUM SERPL-SCNC: 141 MMOL/L — SIGNIFICANT CHANGE UP (ref 135–145)
WBC # BLD: 5.21 K/UL — SIGNIFICANT CHANGE UP (ref 3.8–10.5)
WBC # FLD AUTO: 5.21 K/UL — SIGNIFICANT CHANGE UP (ref 3.8–10.5)

## 2021-07-05 PROCEDURE — 70450 CT HEAD/BRAIN W/O DYE: CPT | Mod: 26

## 2021-07-05 PROCEDURE — 70551 MRI BRAIN STEM W/O DYE: CPT | Mod: 26

## 2021-07-05 PROCEDURE — 99220: CPT

## 2021-07-05 RX ORDER — SODIUM CHLORIDE 9 MG/ML
1000 INJECTION, SOLUTION INTRAVENOUS
Refills: 0 | Status: DISCONTINUED | OUTPATIENT
Start: 2021-07-05 | End: 2021-07-08

## 2021-07-05 RX ORDER — DEXTROSE 50 % IN WATER 50 %
25 SYRINGE (ML) INTRAVENOUS ONCE
Refills: 0 | Status: DISCONTINUED | OUTPATIENT
Start: 2021-07-05 | End: 2021-07-08

## 2021-07-05 RX ORDER — ATENOLOL 25 MG/1
100 TABLET ORAL DAILY
Refills: 0 | Status: DISCONTINUED | OUTPATIENT
Start: 2021-07-05 | End: 2021-07-06

## 2021-07-05 RX ORDER — DEXTROSE 50 % IN WATER 50 %
15 SYRINGE (ML) INTRAVENOUS ONCE
Refills: 0 | Status: DISCONTINUED | OUTPATIENT
Start: 2021-07-05 | End: 2021-07-08

## 2021-07-05 RX ORDER — GABAPENTIN 400 MG/1
200 CAPSULE ORAL DAILY
Refills: 0 | Status: DISCONTINUED | OUTPATIENT
Start: 2021-07-05 | End: 2021-07-05

## 2021-07-05 RX ORDER — DEXTROSE 50 % IN WATER 50 %
12.5 SYRINGE (ML) INTRAVENOUS ONCE
Refills: 0 | Status: DISCONTINUED | OUTPATIENT
Start: 2021-07-05 | End: 2021-07-08

## 2021-07-05 RX ORDER — INSULIN LISPRO 100/ML
VIAL (ML) SUBCUTANEOUS
Refills: 0 | Status: DISCONTINUED | OUTPATIENT
Start: 2021-07-05 | End: 2021-07-08

## 2021-07-05 RX ORDER — HYDRALAZINE HCL 50 MG
100 TABLET ORAL DAILY
Refills: 0 | Status: DISCONTINUED | OUTPATIENT
Start: 2021-07-05 | End: 2021-07-08

## 2021-07-05 RX ORDER — LISINOPRIL 2.5 MG/1
40 TABLET ORAL DAILY
Refills: 0 | Status: DISCONTINUED | OUTPATIENT
Start: 2021-07-05 | End: 2021-07-08

## 2021-07-05 RX ORDER — SODIUM CHLORIDE 9 MG/ML
1000 INJECTION INTRAMUSCULAR; INTRAVENOUS; SUBCUTANEOUS ONCE
Refills: 0 | Status: COMPLETED | OUTPATIENT
Start: 2021-07-05 | End: 2021-07-05

## 2021-07-05 RX ORDER — AMLODIPINE BESYLATE 2.5 MG/1
10 TABLET ORAL DAILY
Refills: 0 | Status: DISCONTINUED | OUTPATIENT
Start: 2021-07-05 | End: 2021-07-08

## 2021-07-05 RX ORDER — GLUCAGON INJECTION, SOLUTION 0.5 MG/.1ML
1 INJECTION, SOLUTION SUBCUTANEOUS ONCE
Refills: 0 | Status: DISCONTINUED | OUTPATIENT
Start: 2021-07-05 | End: 2021-07-08

## 2021-07-05 RX ORDER — GABAPENTIN 400 MG/1
400 CAPSULE ORAL DAILY
Refills: 0 | Status: DISCONTINUED | OUTPATIENT
Start: 2021-07-05 | End: 2021-07-08

## 2021-07-05 RX ORDER — ATORVASTATIN CALCIUM 80 MG/1
80 TABLET, FILM COATED ORAL AT BEDTIME
Refills: 0 | Status: DISCONTINUED | OUTPATIENT
Start: 2021-07-05 | End: 2021-07-08

## 2021-07-05 RX ORDER — SODIUM CHLORIDE 9 MG/ML
1000 INJECTION INTRAMUSCULAR; INTRAVENOUS; SUBCUTANEOUS
Refills: 0 | Status: DISCONTINUED | OUTPATIENT
Start: 2021-07-05 | End: 2021-07-05

## 2021-07-05 RX ORDER — ASPIRIN/CALCIUM CARB/MAGNESIUM 324 MG
81 TABLET ORAL DAILY
Refills: 0 | Status: DISCONTINUED | OUTPATIENT
Start: 2021-07-05 | End: 2021-07-08

## 2021-07-05 RX ADMIN — ATORVASTATIN CALCIUM 80 MILLIGRAM(S): 80 TABLET, FILM COATED ORAL at 21:25

## 2021-07-05 RX ADMIN — SODIUM CHLORIDE 1000 MILLILITER(S): 9 INJECTION INTRAMUSCULAR; INTRAVENOUS; SUBCUTANEOUS at 18:28

## 2021-07-05 RX ADMIN — GABAPENTIN 400 MILLIGRAM(S): 400 CAPSULE ORAL at 21:25

## 2021-07-05 NOTE — CONSULT NOTE ADULT - SUBJECTIVE AND OBJECTIVE BOX
HPI:  76 y/o man with PMHx HTN and T2DM presents with complaints of left hand numbness and twitching x 4 weeks for which Neurology is consulted for. Pt endorses twitching and weakness to the L hand which intermittently occurs, no provoking factors. Pt also reports that sensation of his left leg is diminished and becomes intermittently weaker when ***. Pt went to PMD for ongoing symptoms and was referred to Neurology, appointment was planned for August 6th but patient didn't want to wait so came to ED. Pt denies radiculopathy symptoms, joint or muscle pain, fall, head trauma, trauma to left arm, headache, dizziness, right-sided weakness or sensory changes, chest pain, sob, cough, abdominal pain, nausea, vomiting, fevers, or chills.    MEDICATIONS  (STANDING):    MEDICATIONS  (PRN):    PAST MEDICAL & SURGICAL HISTORY:  DM (diabetes mellitus)  Gout  HTN (hypertension)  BPH (benign prostatic hyperplasia)  H/O eye surgery    FAMILY HISTORY:  Family history of diabetes mellitus (Mother)    Allergies  No Known Allergies    SHx - No smoking, No ETOH, No drug abuse    Review of Systems:  CONSTITUTIONAL: No fevers, chills  HEENT:  No visual loss, blurred vision, double vision.  No hearing loss, sneezing, congestion, runny nose or sore throat.  SKIN:  No rash or itching.  CARDIOVASCULAR:  No chest pain, chest pressure or chest discomfort. No palpitations or edema.  RESPIRATORY:  No shortness of breath, cough or sputum.  GASTROINTESTINAL:  No anorexia, nausea, vomiting or diarrhea. No abdominal pain.  GENITOURINARY:  No dysuria. No increased frequency. No retention. No incontinence.  NEUROLOGICAL:  See HPI  MUSCULOSKELETAL:  No muscle, back pain, joint pain or stiffness.  HEMATOLOGIC:  No anemia, bleeding or bruising.    Vital Signs Last 24 Hrs  T(C): 37.1 (05 Jul 2021 10:00), Max: 37.1 (05 Jul 2021 10:00)  T(F): 98.7 (05 Jul 2021 10:00), Max: 98.7 (05 Jul 2021 10:00)  HR: 58 (05 Jul 2021 14:05) (58 - 70)  BP: 173/81 (05 Jul 2021 14:05) (155/75 - 174/64)  BP(mean): --  RR: 16 (05 Jul 2021 10:00) (16 - 16)  SpO2: 100% (05 Jul 2021 10:00) (100% - 100%)    PHYSICAL EXAM:  GENERAL: NAD  HEENT: Normocephalic; conjunctivae and sclerae clear; moist mucous membranes;   NECK: Supple  EXTREMITIES: No cyanosis; no edema; no calf tenderness  SKIN: Warm and dry; no rash             Neurological Exam:      Other:    07-05    137  |  101  |  31<H>  ----------------------------<  205<H>  4.5   |  24  |  2.30<H>    Ca    9.8      05 Jul 2021 11:45    TPro  6.8  /  Alb  3.8  /  TBili  0.2  /  DBili  x   /  AST  15  /  ALT  12  /  AlkPhos  68  07-05                            11.5   5.21  )-----------( 226      ( 05 Jul 2021 11:45 )             35.7       Radiology  CT Head No Cont (07.05.21 @ 13:10) >  IMPRESSION:  -No acute transcortical infarct or intracranial hemorrhage.  -Prominent lateral ventricles with acute callosal angle. Cannot exclude normal pressure hydrocephalus.            HPI:  74 y/o right-handed man with PMHx HTN and T2DM presents with complaints of left hand numbness/weakness associated with intermittent "twitching" x 4 weeks for which Neurology is consulted for. Pt states his symptoms have been stable and have not progressed. He especially notices it after a period of rest and then picks up a cup and suddenly feels like he loses  strength. Pt also reports that the sensation of his left leg is diminished which has started around the same time and has not impacted his ability to walk. He continues to be able to ambulate without assistance. Pt went to his primary care doctor for ongoing symptoms and was referred to Neurology, appointment was planned for August 6th but patient didn't want to wait so came to ED. Pt denies radiculopathy symptoms, cramping, joint or muscle pain, speech changes, facial involvement, fall, head trauma, trauma to left arm, headache, dizziness, right-sided weakness, chest pain, sob, cough, abdominal pain, nausea, vomiting, fevers, or chills. Pt occasionally has burning sensation at the plantar aspect of his feet. He is compliant with his medications for diabetes and hypertension. He takes Aspirin, states it is because of his high blood pressure. No known hx of stroke or seizure.    MEDICATIONS  (STANDING):    MEDICATIONS  (PRN):    PAST MEDICAL & SURGICAL HISTORY:  DM (diabetes mellitus)  Gout  HTN (hypertension)  BPH (benign prostatic hyperplasia)  H/O eye surgery    FAMILY HISTORY:  Family history of diabetes mellitus (Mother)    Allergies  No Known Allergies    SHx - No smoking, No ETOH, No drug abuse    Review of Systems:  CONSTITUTIONAL: No fevers, chills  HEENT:  No visual loss, blurred vision, double vision.  No hearing loss, sneezing, congestion, runny nose or sore throat.  SKIN:  No rash or itching.  CARDIOVASCULAR:  No chest pain, chest pressure or chest discomfort. No palpitations or edema.  RESPIRATORY:  No shortness of breath, cough or sputum.  GASTROINTESTINAL:  No anorexia, nausea, vomiting or diarrhea. No abdominal pain.  GENITOURINARY:  No dysuria. No increased frequency. No retention. No incontinence.  NEUROLOGICAL:  See HPI  MUSCULOSKELETAL:  No muscle, back pain, joint pain or stiffness.  HEMATOLOGIC:  No anemia, bleeding or bruising.    Vital Signs Last 24 Hrs  T(C): 37.1 (05 Jul 2021 10:00), Max: 37.1 (05 Jul 2021 10:00)  T(F): 98.7 (05 Jul 2021 10:00), Max: 98.7 (05 Jul 2021 10:00)  HR: 58 (05 Jul 2021 14:05) (58 - 70)  BP: 173/81 (05 Jul 2021 14:05) (155/75 - 174/64)  BP(mean): --  RR: 16 (05 Jul 2021 10:00) (16 - 16)  SpO2: 100% (05 Jul 2021 10:00) (100% - 100%)    PHYSICAL EXAM:  GENERAL: NAD  HEENT: Normocephalic; conjunctivae and sclerae clear; moist mucous membranes;   NECK: Supple  EXTREMITIES: No cyanosis; no edema; no calf tenderness  SKIN: Warm and dry; no rash             Neurological Exam:  - Mental Status: Alert, oriented to person, place, time, president, situation; speech is fluent with intact naming, repetition, and comprehension; follows all commands  - Cranial Nerves II-XII:    II:  PERRL 4mm b/l; visual fields are full to confrontation  III, IV, VI:  EOMI, no nystagmus  V:  facial sensation is intact in the V1-V3 distribution bilaterally.  VII:  face is symmetric with normal eye closure and smile  VIII:  hearing is intact to finger rub  IX, X:  uvula is midline and soft palate rises symmetrically  XI:  head turning and shoulder shrug are intact bilaterally  XII:  tongue protrudes in the midline  - Motor: strength is 5/5 throughout with the exception of L biceps 4+/5 and slightly diminished  strength on left; normal muscle bulk and tone throughout; no involuntary movements/twitching/fascics, very mild L>R postural tremor, no resting tremor, no pronator drift  - Reflexes: 2+ and symmetric at the biceps, triceps, brachioradialis, knees, and ankles; plantar reflexes downgoing bilaterally  - Sensory: diminished to light touch LUE, L dorsum of hand, LLE; intact to pin prick, vibration, and joint-position sense throughout; diminished sensation to cold touch LLE proximal>distal  - Coordination:  finger-nose-finger and heel-knee-shin intact without dysmetria; rapid alternating hand movements intact  - Gait:  normal steps, base, arm swing, and turning    Other:    07-05    137  |  101  |  31<H>  ----------------------------<  205<H>  4.5   |  24  |  2.30<H>    Ca    9.8      05 Jul 2021 11:45    TPro  6.8  /  Alb  3.8  /  TBili  0.2  /  DBili  x   /  AST  15  /  ALT  12  /  AlkPhos  68  07-05                          11.5   5.21  )-----------( 226      ( 05 Jul 2021 11:45 )             35.7       Radiology  CT Head No Cont (07.05.21 @ 13:10) >  IMPRESSION:  -No acute transcortical infarct or intracranial hemorrhage.  -Prominent lateral ventricles with acute callosal angle. Cannot exclude normal pressure hydrocephalus.

## 2021-07-05 NOTE — ED CDU PROVIDER INITIAL DAY NOTE - FAMILY HISTORY
Mother  Still living? Unknown  Family history of diabetes mellitus, Age at diagnosis: Age Unknown  Family history of hypertension, Age at diagnosis: Age Unknown

## 2021-07-05 NOTE — ED ADULT NURSE NOTE - CHIEF COMPLAINT QUOTE
c/o tremors, weakness, less sensation and tingling to left arm and left leg for last 4 months. States speech is sometimes slurred but not now. Pt was seen by PCP and referred to neurologist but neuro appt not until August. Pt feels he cannot wait because pt intermittently is still getting these symptoms. Denies any symptoms right now.

## 2021-07-05 NOTE — ED CDU PROVIDER INITIAL DAY NOTE - OBJECTIVE STATEMENT
75y Male with PMHx of HTN, DM presents to the ER for weakness. Patient reports L hand weakness and shaking x 4 weeks. Patient reports intermittent weakness and shaking without known triggers. Denies trauma or injury. States that he saw his PMD Dr. Liana Cardenas who referred him to a Neurologist but he was not able to an appointment until August 6th. Denies fever, chills, acute changes in vision, ringing in ear, headache, dizziness, chest pain, shortness of breath, nausea, vomiting, abdominal pain or diarrhea. In ER, patient received labs and CT head IMPRESSION: No acute transcortical infarct or intracranial hemorrhage. Prominent lateral ventricles with acute callosal angle. Cannot exclude normal pressure hydrocephalus. Neuro was consulted. CDU for MR and neuro re-evaluation.

## 2021-07-05 NOTE — ED PROVIDER NOTE - OBJECTIVE STATEMENT
74yo M w/ pmhx of HTN and DM presents to the ED c/o L hand numbness and twitching x 4 weeks. Pt endorses twitching and weakness to the L hand which intermittently occurs, no provoking factors, also feels like his L leg sensation is diminished and also intermittently weaker. Pt went to PMD and was referred to Neurology, appt for August 6th but patient didn't feel comfortable waiting so came to ER for eval. Denies any ha, dizziness, blurred vision, new/evolving weakness, cp, sob, syncope, interscapular pain, N/V/D, diaphoresis, palpitations, cough, abdominal pain, pain associated with food intake, fevers, chills or night sweats. Patient denies recent surgery, prolonged immobility or travel, hemoptysis, hx of active ca, extremity swelling, hx of dvt/pe.  PMD= Liana Cardenas

## 2021-07-05 NOTE — ED PROVIDER NOTE - CLINICAL SUMMARY MEDICAL DECISION MAKING FREE TEXT BOX
A:  -74yo M w/ pmhx of HTN and DM presents to the ED c/o L hand numbness and twitching x 4 weeks. Pt endorses twitching and weakness to the L hand   P:  -Labs, CT head, CTA head/neck w/ IV, Neuro consult

## 2021-07-05 NOTE — ED CDU PROVIDER INITIAL DAY NOTE - MEDICAL DECISION MAKING DETAILS
75y Male with PMHx of HTN, DM presents to the ER for weakness. Patient reports L hand weakness and shaking x 4 weeks. Patient reports intermittent weakness and shaking without known triggers. Denies trauma or injury. States that he saw his PMD Dr. Liana Cardenas who referred him to a Neurologist but he was not able to an appointment until August 6th. Denies fever, chills, acute changes in vision, ringing in ear, headache, dizziness, chest pain, shortness of breath, nausea, vomiting, abdominal pain or diarrhea. In ER, patient received labs and CT head IMPRESSION: No acute transcortical infarct or intracranial hemorrhage. Prominent lateral ventricles with acute callosal angle. Cannot exclude normal pressure hydrocephalus. Neuro was consulted. No focal neuro deficit. CDU for MR and neuro re-evaluation.

## 2021-07-05 NOTE — ED PROVIDER NOTE - NEUROLOGICAL SENSATION DIMINISHED
L hand w/ diminished sensation compared to R hand, LLE w/ subjective diminished sensation. Strength 5/5 B/L UE & LE./left upper extremity/left lower extremity L hand, LUE, and LLE w/ diminished sensation compared to R hand, LLE w/ subjective diminished sensation. Strength 5/5 B/L UE & LE. throughout/left upper extremity/left lower extremity

## 2021-07-05 NOTE — ED ADULT TRIAGE NOTE - CHIEF COMPLAINT QUOTE
c/o tremors, weakness, less sensation and tingling to left arm and left leg for last 4 months. Pt was seen by PCP and referred to neurologist but neuro appt not until August. Pt feels he cannot wait because pt intermittently is still getting these symptoms. Denies any symptoms right now. c/o tremors, weakness, less sensation and tingling to left arm and left leg for last 4 months. States speech is sometimes slurred but not now. Pt was seen by PCP and referred to neurologist but neuro appt not until August. Pt feels he cannot wait because pt intermittently is still getting these symptoms. Denies any symptoms right now.

## 2021-07-05 NOTE — CONSULT NOTE ADULT - ATTENDING COMMENTS
Date of service: 7/6/2021. Patient seen and examined at bedside.    75 year ol man with a hx of HTN, DM2 presented with 1 month hx of intermittent left arm/hand and left leg numbness/paresthesias. The symptoms have been stable. He experiences the symptom more so after sitting for prolonged periods of time. He describes numbness in left hand, but also describes numbness in left arm as well?. He describes an odd feeling in left leg after getting up from sitting position. No radicular symptoms in arms or legs.     Given persistent left sided hemisensory disturbances and stroke risk factors, patient was admitted to CDU to rule out stroke. MRI brain was obtained and was negative for acute stroke and showed moderate generalized volume loss and chronic microvascular ischemic changes.    On exam no weakness, or sensory loss to light touch appreciated. Reflexes 2+ biceps and patellars. No clonus or babinski. + Phalen sign on the left.     IMP: Intermittent left hand/arm and left leg numbness/paresthesias x 1 month         Presentation not consistent with stroke         Suspect likely etiology of left hand symptoms 2/2 carpal tunnel syndrome +/- cervical radiculopathy         Suspect LE sensory disturbances 2/2 lumbar radiculopathy    EMG/NCS can be performed as outpatient  Can consider MRI cervical/lumbar spine as outpatient  Discussed using a wrist splint at nighttimes  Patient has an appointment with Neurologist- August 6th, 2021

## 2021-07-05 NOTE — CONSULT NOTE ADULT - ASSESSMENT
76 y/o right-handed man with PMHx HTN and T2DM presents with complaints of left hand numbness/weakness associated with intermittent "twitching" x 4 weeks, as well as LLE numbness for which Neurology is consulted for. Pt cannot recall if symptoms were sudden onset a month ago, though they have not progressed. Motor exam revealed 4+/5 L bicep strength, normal tone, no atrophy, no involuntary movements. There is diminished sensation to light touch for posterior aspect of LUE including dorsum of hand and LLE. Proprioception, JPS and pinprick intact bilaterally and symmetrical. No ataxia. Reflexes 2+ throughout and no signs of myelopathy. CT head showed chronic microvascular ischemic changes and possible R frontal dermoid cyst vs. hemangioma.    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 Singh, neurology attending.    Veronica Bella DO  PGY-2  Neurology Resident

## 2021-07-05 NOTE — ED ADULT NURSE NOTE - NS ED NURSE LEVEL OF CONSCIOUSNESS AFFECT
"""S/P IOL OD: Sensar AAB00 21.0 +Omidria. Continue post operative instructions and drops per schedule.  """ Calm

## 2021-07-05 NOTE — ED PROVIDER NOTE - ATTENDING CONTRIBUTION TO CARE
76 yo M w/ pmh of HTN and DM presents to the ED c/o L hand numbness and twitching x 4 weeks. Twitching occurs to the L hand which intermittently without provoking factors. Also complaining of subjective decreased L leg sensation and strength. No recent HA, vision/hearing changes, back pain, paresthesia, loss of function, bowel/bladder changes, cp, sob, syncope, N/V/D, diaphoresis, palpitations, cough, abdominal pain, or night sweats.  -long standing neurologic changes, less likely acute CVA. Cannot exclude chronic cva. Will obtain CT head and consult neurology.  -Mild intermittent tremor observed without changes with intention.

## 2021-07-05 NOTE — ED CDU PROVIDER INITIAL DAY NOTE - NS ED ROS FT
Constitutional: (-) Fever, (-) Chills, (-) Anorexia  Skin: (-) Rashes  Eyes: (-) Visual change  Ears: (-) Hearing loss, (-)Tinnitus  Nose: (-) Nasal congestion, (-) Runny nose  Mouth/Throat: (-) Sore throat  CV: (-) Chest pain, (-) Palpitations, (-) Diaphoresis, (-) Extremity Swelling, (-) Syncope  Resp: (-) Cough, (-) Shortness of breath, (-) Dyspnea on Exertion, (-) Wheezing  GI: (-) Abdominal pain, (-) Nausea, (-) Vomiting, (-) Diarrhea  : (-) Dysuria  MSK: (-) Weakness, (-) Myalgias, (-) Back pain, (-) Neck pain  Neuro: (-) Loss of consciousness, (-) Headache

## 2021-07-06 VITALS
OXYGEN SATURATION: 100 % | HEART RATE: 75 BPM | RESPIRATION RATE: 17 BRPM | TEMPERATURE: 98 F | DIASTOLIC BLOOD PRESSURE: 88 MMHG | SYSTOLIC BLOOD PRESSURE: 173 MMHG

## 2021-07-06 LAB
ALBUMIN SERPL ELPH-MCNC: 3.8 G/DL — SIGNIFICANT CHANGE UP (ref 3.3–5)
ALP SERPL-CCNC: 71 U/L — SIGNIFICANT CHANGE UP (ref 40–120)
ALT FLD-CCNC: 10 U/L — SIGNIFICANT CHANGE UP (ref 4–41)
ANION GAP SERPL CALC-SCNC: 11 MMOL/L — SIGNIFICANT CHANGE UP (ref 7–14)
AST SERPL-CCNC: 14 U/L — SIGNIFICANT CHANGE UP (ref 4–40)
BASOPHILS # BLD AUTO: 0.02 K/UL — SIGNIFICANT CHANGE UP (ref 0–0.2)
BASOPHILS NFR BLD AUTO: 0.4 % — SIGNIFICANT CHANGE UP (ref 0–2)
BILIRUB SERPL-MCNC: 0.4 MG/DL — SIGNIFICANT CHANGE UP (ref 0.2–1.2)
BUN SERPL-MCNC: 27 MG/DL — HIGH (ref 7–23)
CALCIUM SERPL-MCNC: 9.8 MG/DL — SIGNIFICANT CHANGE UP (ref 8.4–10.5)
CHLORIDE SERPL-SCNC: 102 MMOL/L — SIGNIFICANT CHANGE UP (ref 98–107)
CO2 SERPL-SCNC: 25 MMOL/L — SIGNIFICANT CHANGE UP (ref 22–31)
CREAT SERPL-MCNC: 2.02 MG/DL — HIGH (ref 0.5–1.3)
EOSINOPHIL # BLD AUTO: 0.1 K/UL — SIGNIFICANT CHANGE UP (ref 0–0.5)
EOSINOPHIL NFR BLD AUTO: 1.9 % — SIGNIFICANT CHANGE UP (ref 0–6)
GLUCOSE SERPL-MCNC: 135 MG/DL — HIGH (ref 70–99)
HCT VFR BLD CALC: 38.4 % — LOW (ref 39–50)
HGB BLD-MCNC: 11.8 G/DL — LOW (ref 13–17)
IANC: 3.03 K/UL — SIGNIFICANT CHANGE UP (ref 1.5–8.5)
IMM GRANULOCYTES NFR BLD AUTO: 0.4 % — SIGNIFICANT CHANGE UP (ref 0–1.5)
LYMPHOCYTES # BLD AUTO: 1.42 K/UL — SIGNIFICANT CHANGE UP (ref 1–3.3)
LYMPHOCYTES # BLD AUTO: 27.6 % — SIGNIFICANT CHANGE UP (ref 13–44)
MCHC RBC-ENTMCNC: 26.3 PG — LOW (ref 27–34)
MCHC RBC-ENTMCNC: 30.7 GM/DL — LOW (ref 32–36)
MCV RBC AUTO: 85.7 FL — SIGNIFICANT CHANGE UP (ref 80–100)
MONOCYTES # BLD AUTO: 0.56 K/UL — SIGNIFICANT CHANGE UP (ref 0–0.9)
MONOCYTES NFR BLD AUTO: 10.9 % — SIGNIFICANT CHANGE UP (ref 2–14)
NEUTROPHILS # BLD AUTO: 3.03 K/UL — SIGNIFICANT CHANGE UP (ref 1.8–7.4)
NEUTROPHILS NFR BLD AUTO: 58.8 % — SIGNIFICANT CHANGE UP (ref 43–77)
NRBC # BLD: 0 /100 WBCS — SIGNIFICANT CHANGE UP
NRBC # FLD: 0 K/UL — SIGNIFICANT CHANGE UP
PLATELET # BLD AUTO: 228 K/UL — SIGNIFICANT CHANGE UP (ref 150–400)
POTASSIUM SERPL-MCNC: 4.6 MMOL/L — SIGNIFICANT CHANGE UP (ref 3.5–5.3)
POTASSIUM SERPL-SCNC: 4.6 MMOL/L — SIGNIFICANT CHANGE UP (ref 3.5–5.3)
PROT SERPL-MCNC: 7.1 G/DL — SIGNIFICANT CHANGE UP (ref 6–8.3)
RBC # BLD: 4.48 M/UL — SIGNIFICANT CHANGE UP (ref 4.2–5.8)
RBC # FLD: 13.9 % — SIGNIFICANT CHANGE UP (ref 10.3–14.5)
SODIUM SERPL-SCNC: 138 MMOL/L — SIGNIFICANT CHANGE UP (ref 135–145)
WBC # BLD: 5.15 K/UL — SIGNIFICANT CHANGE UP (ref 3.8–10.5)
WBC # FLD AUTO: 5.15 K/UL — SIGNIFICANT CHANGE UP (ref 3.8–10.5)

## 2021-07-06 PROCEDURE — 99217: CPT

## 2021-07-06 PROCEDURE — 99283 EMERGENCY DEPT VISIT LOW MDM: CPT

## 2021-07-06 RX ORDER — LABETALOL HCL 100 MG
1 TABLET ORAL
Qty: 42 | Refills: 0
Start: 2021-07-06 | End: 2021-07-19

## 2021-07-06 RX ADMIN — GABAPENTIN 400 MILLIGRAM(S): 400 CAPSULE ORAL at 11:14

## 2021-07-06 RX ADMIN — Medication 81 MILLIGRAM(S): at 11:07

## 2021-07-06 RX ADMIN — Medication 100 MILLIGRAM(S): at 05:35

## 2021-07-06 RX ADMIN — Medication 1: at 11:34

## 2021-07-06 RX ADMIN — LISINOPRIL 40 MILLIGRAM(S): 2.5 TABLET ORAL at 05:32

## 2021-07-06 RX ADMIN — ATENOLOL 100 MILLIGRAM(S): 25 TABLET ORAL at 05:34

## 2021-07-06 RX ADMIN — AMLODIPINE BESYLATE 10 MILLIGRAM(S): 2.5 TABLET ORAL at 05:33

## 2021-07-06 NOTE — ED CDU PROVIDER DISPOSITION NOTE - PATIENT PORTAL LINK FT
You can access the FollowMyHealth Patient Portal offered by [facility] by registering at the following website: [email or web address]. By joining [facility]’s FollowMyHealth portal, you will also be able to view your health information using other applications (apps) compatible with our system.

## 2021-07-06 NOTE — ED CDU PROVIDER SUBSEQUENT DAY NOTE - PROGRESS NOTE DETAILS
cdu meliton muniz: pt resting comfortably in bed all night, awaiting mri read and will discuss with neuro. pt denies any chest pain, sob, palpitations, any additional numbness/weakness, will reasses MR Head consistent w/ moderate generalized cerebral volume loss.  Moderate to severe patchy chronic microvascular ischemic disease. Patient seen by neurology, suspect cervical radiculopathy vs. carpal tunnel, recommend further work up with outpatient neurologist on 8/6/21 for possible EMG/NCS. Rpt Cr this morning 2.02. Patient remains hypertensive w/ systolic BP of 175, already taking Atenolol, Hydralazine, Amlodipine-Benzapril. Called PMD Dr. Liana Cardenas, agrees with plan to change Atenolol to Labetolol 100mg TID. States pt does have a hx of CKD but Cr has been improving. Agrees with plan for outpatient nephrology referral. Discussed plan with pt who is in agreement. Will be driving himself home. Feels well otherwise.

## 2021-07-06 NOTE — ED CDU PROVIDER DISPOSITION NOTE - NSFOLLOWUPINSTRUCTIONS_ED_ALL_ED_FT
See your primary care doctor within 24-48 hours. Follow up with the kidney doctor within 2 weeks for further evaluation of your kidney function (our discharge center will call you to help make an appointment), bring copies of all reports with you. Follow up with your neurologist on August 6 for a possible Nerve Conduction Study/Electromyography (muscle test) to further evaluate your hand tremor. STOP THE ATENOLOL, continue all other home medications. START Labetolol 100mg three times a day for your blood pressure. Check your blood pressure regularly and record the readings in a log to show your primary doctor. Return to the ER for worsening symptoms, difficulty speaking/swallowing/walking, or any other concerns.

## 2021-07-06 NOTE — ED CDU PROVIDER DISPOSITION NOTE - CLINICAL COURSE
75y Male with PMHx of HTN, DM presents to the ER for weakness. Patient reports L hand weakness and shaking x 4 weeks. In ER, patient received labs and CT head IMPRESSION: No acute transcortical infarct or intracranial hemorrhage. Prominent lateral ventricles with acute callosal angle. Cannot exclude normal pressure hydrocephalus. Neuro was consulted. CDU for MR and neuro re-evaluation. MR Head consistent w/ moderate generalized cerebral volume loss.  Moderate to severe patchy chronic microvascular ischemic disease. Patient seen by neurology, suspect cervical radiculopathy vs. carpal tunnel, recommend further work up with outpatient neurologist on 8/6/21 for possible EMG/NCS. Rpt Cr this morning 2.02. Patient remains hypertensive w/ systolic BP of 175, already taking Atenolol, Hydralazine, Amlodipine-Benzapril. Called PMD Dr. Liana Cardenas, agrees with plan to change Atenolol to Labetolol 100mg TID. States pt does have a hx of CKD but Cr has been improving. Agrees with plan for outpatient nephrology referral. Discussed plan with pt who is in agreement. Will be driving himself home. Feels well otherwise.

## 2021-08-13 ENCOUNTER — APPOINTMENT (OUTPATIENT)
Dept: NEPHROLOGY | Facility: CLINIC | Age: 76
End: 2021-08-13

## 2022-08-12 NOTE — ED PROVIDER NOTE - INPATIENT RESIDENT/ACP NOTIFIED DATE
----- Message from Melanie Simon MD sent at 8/12/2022  1:43 PM CDT -----  Please let this patient of Dr Pierre's know her pap is normal.  She is still having periods, and her LMP is listed as 7/28/22 in the note from her visit, so it is normal for the endometrial cells to be there.  No need for further follow up.  Thanks!   19-Sep-2020 12:50

## 2022-09-24 ENCOUNTER — INPATIENT (INPATIENT)
Facility: HOSPITAL | Age: 77
LOS: 5 days | Discharge: HOME HEALTH | End: 2022-09-30
Attending: INTERNAL MEDICINE | Admitting: INTERNAL MEDICINE

## 2022-09-24 VITALS
RESPIRATION RATE: 18 BRPM | TEMPERATURE: 98 F | OXYGEN SATURATION: 99 % | SYSTOLIC BLOOD PRESSURE: 173 MMHG | HEIGHT: 69 IN | DIASTOLIC BLOOD PRESSURE: 76 MMHG | HEART RATE: 79 BPM

## 2022-09-24 DIAGNOSIS — M10.9 GOUT, UNSPECIFIED: ICD-10-CM

## 2022-09-24 DIAGNOSIS — Z29.9 ENCOUNTER FOR PROPHYLACTIC MEASURES, UNSPECIFIED: ICD-10-CM

## 2022-09-24 DIAGNOSIS — K21.9 GASTRO-ESOPHAGEAL REFLUX DISEASE WITHOUT ESOPHAGITIS: ICD-10-CM

## 2022-09-24 DIAGNOSIS — E11.9 TYPE 2 DIABETES MELLITUS WITHOUT COMPLICATIONS: ICD-10-CM

## 2022-09-24 DIAGNOSIS — N17.9 ACUTE KIDNEY FAILURE, UNSPECIFIED: ICD-10-CM

## 2022-09-24 DIAGNOSIS — I25.10 ATHEROSCLEROTIC HEART DISEASE OF NATIVE CORONARY ARTERY WITHOUT ANGINA PECTORIS: ICD-10-CM

## 2022-09-24 DIAGNOSIS — I10 ESSENTIAL (PRIMARY) HYPERTENSION: ICD-10-CM

## 2022-09-24 DIAGNOSIS — K92.2 GASTROINTESTINAL HEMORRHAGE, UNSPECIFIED: ICD-10-CM

## 2022-09-24 DIAGNOSIS — Z98.89 OTHER SPECIFIED POSTPROCEDURAL STATES: Chronic | ICD-10-CM

## 2022-09-24 LAB
ALBUMIN SERPL ELPH-MCNC: 3.2 G/DL — LOW (ref 3.3–5)
ALBUMIN SERPL ELPH-MCNC: 3.8 G/DL — SIGNIFICANT CHANGE UP (ref 3.3–5)
ALP SERPL-CCNC: 61 U/L — SIGNIFICANT CHANGE UP (ref 40–120)
ALP SERPL-CCNC: 75 U/L — SIGNIFICANT CHANGE UP (ref 40–120)
ALT FLD-CCNC: 5 U/L — SIGNIFICANT CHANGE UP (ref 4–41)
ALT FLD-CCNC: 7 U/L — SIGNIFICANT CHANGE UP (ref 4–41)
ANION GAP SERPL CALC-SCNC: 12 MMOL/L — SIGNIFICANT CHANGE UP (ref 7–14)
ANION GAP SERPL CALC-SCNC: 15 MMOL/L — HIGH (ref 7–14)
APTT BLD: 29.7 SEC — SIGNIFICANT CHANGE UP (ref 27–36.3)
AST SERPL-CCNC: 12 U/L — SIGNIFICANT CHANGE UP (ref 4–40)
AST SERPL-CCNC: 9 U/L — SIGNIFICANT CHANGE UP (ref 4–40)
B-OH-BUTYR SERPL-SCNC: <0 MMOL/L — SIGNIFICANT CHANGE UP (ref 0–0.4)
BASE EXCESS BLDV CALC-SCNC: -7.3 MMOL/L — LOW (ref -2–3)
BASE EXCESS BLDV CALC-SCNC: -8.2 MMOL/L — LOW (ref -2–3)
BASOPHILS # BLD AUTO: 0.03 K/UL — SIGNIFICANT CHANGE UP (ref 0–0.2)
BASOPHILS # BLD AUTO: 0.05 K/UL — SIGNIFICANT CHANGE UP (ref 0–0.2)
BASOPHILS NFR BLD AUTO: 0.6 % — SIGNIFICANT CHANGE UP (ref 0–2)
BASOPHILS NFR BLD AUTO: 0.7 % — SIGNIFICANT CHANGE UP (ref 0–2)
BILIRUB SERPL-MCNC: 0.2 MG/DL — SIGNIFICANT CHANGE UP (ref 0.2–1.2)
BILIRUB SERPL-MCNC: 0.4 MG/DL — SIGNIFICANT CHANGE UP (ref 0.2–1.2)
BLD GP AB SCN SERPL QL: NEGATIVE — SIGNIFICANT CHANGE UP
BLOOD GAS VENOUS COMPREHENSIVE RESULT: SIGNIFICANT CHANGE UP
BLOOD GAS VENOUS COMPREHENSIVE RESULT: SIGNIFICANT CHANGE UP
BUN SERPL-MCNC: 52 MG/DL — HIGH (ref 7–23)
BUN SERPL-MCNC: 53 MG/DL — HIGH (ref 7–23)
CALCIUM SERPL-MCNC: 8.7 MG/DL — SIGNIFICANT CHANGE UP (ref 8.4–10.5)
CALCIUM SERPL-MCNC: 9.2 MG/DL — SIGNIFICANT CHANGE UP (ref 8.4–10.5)
CHLORIDE BLDV-SCNC: 107 MMOL/L — SIGNIFICANT CHANGE UP (ref 96–108)
CHLORIDE BLDV-SCNC: 109 MMOL/L — HIGH (ref 96–108)
CHLORIDE SERPL-SCNC: 107 MMOL/L — SIGNIFICANT CHANGE UP (ref 98–107)
CHLORIDE SERPL-SCNC: 109 MMOL/L — HIGH (ref 98–107)
CO2 BLDV-SCNC: 20.7 MMOL/L — LOW (ref 22–26)
CO2 BLDV-SCNC: 21.1 MMOL/L — LOW (ref 22–26)
CO2 SERPL-SCNC: 16 MMOL/L — LOW (ref 22–31)
CO2 SERPL-SCNC: 17 MMOL/L — LOW (ref 22–31)
CREAT SERPL-MCNC: 3.88 MG/DL — HIGH (ref 0.5–1.3)
CREAT SERPL-MCNC: 3.9 MG/DL — HIGH (ref 0.5–1.3)
EGFR: 15 ML/MIN/1.73M2 — LOW
EGFR: 15 ML/MIN/1.73M2 — LOW
EOSINOPHIL # BLD AUTO: 0.06 K/UL — SIGNIFICANT CHANGE UP (ref 0–0.5)
EOSINOPHIL # BLD AUTO: 0.15 K/UL — SIGNIFICANT CHANGE UP (ref 0–0.5)
EOSINOPHIL NFR BLD AUTO: 1.2 % — SIGNIFICANT CHANGE UP (ref 0–6)
EOSINOPHIL NFR BLD AUTO: 2 % — SIGNIFICANT CHANGE UP (ref 0–6)
FLUAV AG NPH QL: SIGNIFICANT CHANGE UP
FLUBV AG NPH QL: SIGNIFICANT CHANGE UP
GAS PNL BLDV: 135 MMOL/L — LOW (ref 136–145)
GAS PNL BLDV: 136 MMOL/L — SIGNIFICANT CHANGE UP (ref 136–145)
GLUCOSE BLDC GLUCOMTR-MCNC: 150 MG/DL — HIGH (ref 70–99)
GLUCOSE BLDC GLUCOMTR-MCNC: 175 MG/DL — HIGH (ref 70–99)
GLUCOSE BLDC GLUCOMTR-MCNC: 217 MG/DL — HIGH (ref 70–99)
GLUCOSE BLDV-MCNC: 188 MG/DL — HIGH (ref 70–99)
GLUCOSE BLDV-MCNC: 261 MG/DL — HIGH (ref 70–99)
GLUCOSE SERPL-MCNC: 212 MG/DL — HIGH (ref 70–99)
GLUCOSE SERPL-MCNC: 260 MG/DL — HIGH (ref 70–99)
HCO3 BLDV-SCNC: 19 MMOL/L — LOW (ref 22–29)
HCO3 BLDV-SCNC: 20 MMOL/L — LOW (ref 22–29)
HCT VFR BLD CALC: 27.5 % — LOW (ref 39–50)
HCT VFR BLD CALC: 32.1 % — LOW (ref 39–50)
HCT VFR BLDA CALC: 25 % — LOW (ref 39–51)
HCT VFR BLDA CALC: 31 % — LOW (ref 39–51)
HGB BLD CALC-MCNC: 10.4 G/DL — LOW (ref 13–17)
HGB BLD CALC-MCNC: 8.3 G/DL — LOW (ref 13–17)
HGB BLD-MCNC: 10.3 G/DL — LOW (ref 13–17)
HGB BLD-MCNC: 8.5 G/DL — LOW (ref 13–17)
IANC: 3.83 K/UL — SIGNIFICANT CHANGE UP (ref 1.8–7.4)
IANC: 4.61 K/UL — SIGNIFICANT CHANGE UP (ref 1.8–7.4)
IMM GRANULOCYTES NFR BLD AUTO: 0.5 % — SIGNIFICANT CHANGE UP (ref 0–0.9)
IMM GRANULOCYTES NFR BLD AUTO: 0.8 % — SIGNIFICANT CHANGE UP (ref 0–0.9)
INR BLD: 1.08 RATIO — SIGNIFICANT CHANGE UP (ref 0.88–1.16)
LACTATE BLDV-MCNC: 1.8 MMOL/L — SIGNIFICANT CHANGE UP (ref 0.5–2)
LACTATE BLDV-MCNC: 2.5 MMOL/L — HIGH (ref 0.5–2)
LIDOCAIN IGE QN: 55 U/L — SIGNIFICANT CHANGE UP (ref 7–60)
LYMPHOCYTES # BLD AUTO: 0.87 K/UL — LOW (ref 1–3.3)
LYMPHOCYTES # BLD AUTO: 16.9 % — SIGNIFICANT CHANGE UP (ref 13–44)
LYMPHOCYTES # BLD AUTO: 2.34 K/UL — SIGNIFICANT CHANGE UP (ref 1–3.3)
LYMPHOCYTES # BLD AUTO: 30.6 % — SIGNIFICANT CHANGE UP (ref 13–44)
MAGNESIUM SERPL-MCNC: 1.9 MG/DL — SIGNIFICANT CHANGE UP (ref 1.6–2.6)
MCHC RBC-ENTMCNC: 26.7 PG — LOW (ref 27–34)
MCHC RBC-ENTMCNC: 27.5 PG — SIGNIFICANT CHANGE UP (ref 27–34)
MCHC RBC-ENTMCNC: 30.9 GM/DL — LOW (ref 32–36)
MCHC RBC-ENTMCNC: 32.1 GM/DL — SIGNIFICANT CHANGE UP (ref 32–36)
MCV RBC AUTO: 85.6 FL — SIGNIFICANT CHANGE UP (ref 80–100)
MCV RBC AUTO: 86.5 FL — SIGNIFICANT CHANGE UP (ref 80–100)
MONOCYTES # BLD AUTO: 0.32 K/UL — SIGNIFICANT CHANGE UP (ref 0–0.9)
MONOCYTES # BLD AUTO: 0.45 K/UL — SIGNIFICANT CHANGE UP (ref 0–0.9)
MONOCYTES NFR BLD AUTO: 5.9 % — SIGNIFICANT CHANGE UP (ref 2–14)
MONOCYTES NFR BLD AUTO: 6.2 % — SIGNIFICANT CHANGE UP (ref 2–14)
NEUTROPHILS # BLD AUTO: 3.83 K/UL — SIGNIFICANT CHANGE UP (ref 1.8–7.4)
NEUTROPHILS # BLD AUTO: 4.61 K/UL — SIGNIFICANT CHANGE UP (ref 1.8–7.4)
NEUTROPHILS NFR BLD AUTO: 60.3 % — SIGNIFICANT CHANGE UP (ref 43–77)
NEUTROPHILS NFR BLD AUTO: 74.3 % — SIGNIFICANT CHANGE UP (ref 43–77)
NRBC # BLD: 0 /100 WBCS — SIGNIFICANT CHANGE UP (ref 0–0)
NRBC # BLD: 0 /100 WBCS — SIGNIFICANT CHANGE UP (ref 0–0)
NRBC # FLD: 0 K/UL — SIGNIFICANT CHANGE UP (ref 0–0)
NRBC # FLD: 0 K/UL — SIGNIFICANT CHANGE UP (ref 0–0)
NT-PROBNP SERPL-SCNC: 853 PG/ML — HIGH
OB PNL STL: POSITIVE
PCO2 BLDV: 45 MMHG — SIGNIFICANT CHANGE UP (ref 42–55)
PCO2 BLDV: 48 MMHG — SIGNIFICANT CHANGE UP (ref 42–55)
PH BLDV: 7.21 — LOW (ref 7.32–7.43)
PH BLDV: 7.25 — LOW (ref 7.32–7.43)
PHOSPHATE SERPL-MCNC: 3.5 MG/DL — SIGNIFICANT CHANGE UP (ref 2.5–4.5)
PLATELET # BLD AUTO: 244 K/UL — SIGNIFICANT CHANGE UP (ref 150–400)
PLATELET # BLD AUTO: 362 K/UL — SIGNIFICANT CHANGE UP (ref 150–400)
PO2 BLDV: 22 MMHG — SIGNIFICANT CHANGE UP
PO2 BLDV: 23 MMHG — SIGNIFICANT CHANGE UP
POTASSIUM BLDV-SCNC: 4.8 MMOL/L — SIGNIFICANT CHANGE UP (ref 3.5–5.1)
POTASSIUM BLDV-SCNC: 6.8 MMOL/L — CRITICAL HIGH (ref 3.5–5.1)
POTASSIUM SERPL-MCNC: 4.9 MMOL/L — SIGNIFICANT CHANGE UP (ref 3.5–5.3)
POTASSIUM SERPL-MCNC: 6.1 MMOL/L — HIGH (ref 3.5–5.3)
POTASSIUM SERPL-SCNC: 4.9 MMOL/L — SIGNIFICANT CHANGE UP (ref 3.5–5.3)
POTASSIUM SERPL-SCNC: 6.1 MMOL/L — HIGH (ref 3.5–5.3)
PROT SERPL-MCNC: 5.9 G/DL — LOW (ref 6–8.3)
PROT SERPL-MCNC: 7 G/DL — SIGNIFICANT CHANGE UP (ref 6–8.3)
PROTHROM AB SERPL-ACNC: 12.5 SEC — SIGNIFICANT CHANGE UP (ref 10.5–13.4)
RBC # BLD: 3.18 M/UL — LOW (ref 4.2–5.8)
RBC # BLD: 3.75 M/UL — LOW (ref 4.2–5.8)
RBC # FLD: 14 % — SIGNIFICANT CHANGE UP (ref 10.3–14.5)
RBC # FLD: 14.1 % — SIGNIFICANT CHANGE UP (ref 10.3–14.5)
RH IG SCN BLD-IMP: POSITIVE — SIGNIFICANT CHANGE UP
RSV RNA NPH QL NAA+NON-PROBE: SIGNIFICANT CHANGE UP
SAO2 % BLDV: 28 % — SIGNIFICANT CHANGE UP
SAO2 % BLDV: 31.4 % — SIGNIFICANT CHANGE UP
SARS-COV-2 RNA SPEC QL NAA+PROBE: SIGNIFICANT CHANGE UP
SODIUM SERPL-SCNC: 136 MMOL/L — SIGNIFICANT CHANGE UP (ref 135–145)
SODIUM SERPL-SCNC: 140 MMOL/L — SIGNIFICANT CHANGE UP (ref 135–145)
TROPONIN T, HIGH SENSITIVITY RESULT: 72 NG/L — CRITICAL HIGH
TROPONIN T, HIGH SENSITIVITY RESULT: 73 NG/L — CRITICAL HIGH
WBC # BLD: 5.15 K/UL — SIGNIFICANT CHANGE UP (ref 3.8–10.5)
WBC # BLD: 7.64 K/UL — SIGNIFICANT CHANGE UP (ref 3.8–10.5)
WBC # FLD AUTO: 5.15 K/UL — SIGNIFICANT CHANGE UP (ref 3.8–10.5)
WBC # FLD AUTO: 7.64 K/UL — SIGNIFICANT CHANGE UP (ref 3.8–10.5)

## 2022-09-24 PROCEDURE — 74178 CT ABD&PLV WO CNTR FLWD CNTR: CPT | Mod: 26,MA

## 2022-09-24 PROCEDURE — 99223 1ST HOSP IP/OBS HIGH 75: CPT | Mod: GC

## 2022-09-24 PROCEDURE — 93010 ELECTROCARDIOGRAM REPORT: CPT

## 2022-09-24 PROCEDURE — 99285 EMERGENCY DEPT VISIT HI MDM: CPT | Mod: 25

## 2022-09-24 RX ORDER — AMLODIPINE BESYLATE 2.5 MG/1
10 TABLET ORAL AT BEDTIME
Refills: 0 | Status: DISCONTINUED | OUTPATIENT
Start: 2022-09-24 | End: 2022-09-25

## 2022-09-24 RX ORDER — GEMFIBROZIL 600 MG
600 TABLET ORAL
Refills: 0 | Status: DISCONTINUED | OUTPATIENT
Start: 2022-09-24 | End: 2022-09-24

## 2022-09-24 RX ORDER — INSULIN HUMAN 100 [IU]/ML
5 INJECTION, SOLUTION SUBCUTANEOUS ONCE
Refills: 0 | Status: COMPLETED | OUTPATIENT
Start: 2022-09-24 | End: 2022-09-24

## 2022-09-24 RX ORDER — ALLOPURINOL 300 MG
100 TABLET ORAL DAILY
Refills: 0 | Status: DISCONTINUED | OUTPATIENT
Start: 2022-09-24 | End: 2022-09-30

## 2022-09-24 RX ORDER — GABAPENTIN 400 MG/1
400 CAPSULE ORAL DAILY
Refills: 0 | Status: DISCONTINUED | OUTPATIENT
Start: 2022-09-24 | End: 2022-09-30

## 2022-09-24 RX ORDER — PANTOPRAZOLE SODIUM 20 MG/1
40 TABLET, DELAYED RELEASE ORAL
Refills: 0 | Status: DISCONTINUED | OUTPATIENT
Start: 2022-09-24 | End: 2022-09-29

## 2022-09-24 RX ORDER — FUROSEMIDE 40 MG
20 TABLET ORAL ONCE
Refills: 0 | Status: COMPLETED | OUTPATIENT
Start: 2022-09-24 | End: 2022-09-25

## 2022-09-24 RX ORDER — SODIUM CHLORIDE 9 MG/ML
1000 INJECTION INTRAMUSCULAR; INTRAVENOUS; SUBCUTANEOUS ONCE
Refills: 0 | Status: COMPLETED | OUTPATIENT
Start: 2022-09-24 | End: 2022-09-24

## 2022-09-24 RX ORDER — ONDANSETRON 8 MG/1
4 TABLET, FILM COATED ORAL ONCE
Refills: 0 | Status: COMPLETED | OUTPATIENT
Start: 2022-09-24 | End: 2022-09-24

## 2022-09-24 RX ORDER — DEXTROSE 50 % IN WATER 50 %
50 SYRINGE (ML) INTRAVENOUS ONCE
Refills: 0 | Status: COMPLETED | OUTPATIENT
Start: 2022-09-24 | End: 2022-09-24

## 2022-09-24 RX ORDER — LANOLIN ALCOHOL/MO/W.PET/CERES
3 CREAM (GRAM) TOPICAL AT BEDTIME
Refills: 0 | Status: DISCONTINUED | OUTPATIENT
Start: 2022-09-24 | End: 2022-09-30

## 2022-09-24 RX ORDER — ATORVASTATIN CALCIUM 80 MG/1
20 TABLET, FILM COATED ORAL AT BEDTIME
Refills: 0 | Status: DISCONTINUED | OUTPATIENT
Start: 2022-09-24 | End: 2022-09-29

## 2022-09-24 RX ORDER — ONDANSETRON 8 MG/1
4 TABLET, FILM COATED ORAL EVERY 8 HOURS
Refills: 0 | Status: DISCONTINUED | OUTPATIENT
Start: 2022-09-24 | End: 2022-09-30

## 2022-09-24 RX ORDER — SODIUM ZIRCONIUM CYCLOSILICATE 10 G/10G
5 POWDER, FOR SUSPENSION ORAL ONCE
Refills: 0 | Status: COMPLETED | OUTPATIENT
Start: 2022-09-24 | End: 2022-09-24

## 2022-09-24 RX ORDER — ACETAMINOPHEN 500 MG
650 TABLET ORAL EVERY 6 HOURS
Refills: 0 | Status: DISCONTINUED | OUTPATIENT
Start: 2022-09-24 | End: 2022-09-27

## 2022-09-24 RX ADMIN — INSULIN HUMAN 5 UNIT(S): 100 INJECTION, SOLUTION SUBCUTANEOUS at 21:50

## 2022-09-24 RX ADMIN — Medication 50 MILLILITER(S): at 21:51

## 2022-09-24 RX ADMIN — SODIUM CHLORIDE 1000 MILLILITER(S): 9 INJECTION INTRAMUSCULAR; INTRAVENOUS; SUBCUTANEOUS at 11:46

## 2022-09-24 RX ADMIN — SODIUM CHLORIDE 1000 MILLILITER(S): 9 INJECTION INTRAMUSCULAR; INTRAVENOUS; SUBCUTANEOUS at 12:09

## 2022-09-24 RX ADMIN — AMLODIPINE BESYLATE 10 MILLIGRAM(S): 2.5 TABLET ORAL at 22:01

## 2022-09-24 RX ADMIN — ONDANSETRON 4 MILLIGRAM(S): 8 TABLET, FILM COATED ORAL at 10:09

## 2022-09-24 RX ADMIN — SODIUM ZIRCONIUM CYCLOSILICATE 5 GRAM(S): 10 POWDER, FOR SUSPENSION ORAL at 21:49

## 2022-09-24 RX ADMIN — ATORVASTATIN CALCIUM 20 MILLIGRAM(S): 80 TABLET, FILM COATED ORAL at 22:01

## 2022-09-24 NOTE — H&P ADULT - PROBLEM SELECTOR PLAN 4
Hi  A1c ordered  LÓPEZ and uptitrate as needed History of DM on ozempic and tresiba   A1c ordered  LÓPEZ and uptitrate as needed

## 2022-09-24 NOTE — H&P ADULT - NSICDXFAMILYHX_GEN_ALL_CORE_FT
"  Pharmacy Consult  -  Warfarin    Brigida Rodriguez is a  39 y.o. male   Height - 193 cm (76\")  Weight - (!) 160 kg (353 lb 3.2 oz)    Consulting Provider: - Hospitalist  Indication: - Hx of PE and LE DVT  Goal INR: 2-3   Home Regimen:   - warfarin 10 mg Sunday, Monday, Tuesday, Wednesday, Thursday, Saturday               - warfarin 12.5 mg on Fridays     Bridge Therapy: none, patient is on SQH but not full anticoagulation    Drug-Drug Interactions with current regimen:   Trazodone- increases bleed risk              Pantoprazole - may increase INR              Zosyn - may increase bleeding risk    Warfarin Dosing During Admission:    Date  2/3 2/4 2/5 2/6 2/7 2/8 2/9 2/10    INR  1.38 1.55 1.88 2.22 2.1 2.2 2.4 2.46    Dose  10mg 10mg 7.5mg 7.5mg  10mg 10mg 10mg 10mg        Education Provided: Patient is on warfarin prior to admission.  Education provided  on 2/3 verbally and in writing .  Discussed effects of warfarin, importance of checking INR, drug-drug and drug-food interactions, and signs/symptoms of bleeding and clotting.  Patient verbalized understanding through teach back.  All pertinent questions were answered.        Discharge Follow up:   Following Provider - [facility] anticoagulation clinic   Follow up time range or appointment: 2-3 days after discharge      Labs:    Results from last 7 days   Lab Units 02/10/20  0702 02/09/20  0706 02/08/20  0538 02/07/20  0826 02/06/20  0523 02/05/20  0653 02/04/20  0848 02/04/20  0723   INR  2.46* 2.40* 2.20* 2.10* 2.22* 1.88* 1.55*  --    HEMOGLOBIN g/dL  --  12.5* 12.3* 12.5* 13.2 12.4*  --  11.9*   HEMATOCRIT %  --  37.1* 37.1* 37.7 38.9 37.4*  --  36.3*   PLATELETS 10*3/mm3  --  295 288 300 292 280  --  202     Results from last 7 days   Lab Units 02/09/20  0706 02/08/20  0538 02/07/20  0826 02/06/20  0523   SODIUM mmol/L 134* 134* 135* 137   POTASSIUM mmol/L 4.1 3.8 4.0 4.2   CHLORIDE mmol/L 100 100 102 99   CO2 mmol/L 23.0 20.0* 22.0 24.0   BUN mg/dL 8 7 6 7 "   CREATININE mg/dL 0.95 1.04 1.12 0.97   CALCIUM mg/dL 9.0 9.0 9.4 9.2   BILIRUBIN mg/dL  --  1.1 1.2 1.4*   ALK PHOS U/L  --  88 86 65   ALT (SGPT) U/L  --  77* 86* 76*   AST (SGOT) U/L  --  44* 58* 70*   GLUCOSE mg/dL 116* 106* 100* 94     Current dietary intake: 25-50% of documented meals, Diet regular, Cardiac  Diet Order   Procedures    Diet Regular; Cardiac       Assessment/Plan:   Warfarin dosing for a history of DVT/PE.   Goal INR; 2-3  2/10 INR - 2.46, increased from 2.4  2/9 H/H - 12.5/37.1    Vitamin K 10mg and K Centra administered 1/30 (expect Vitamin K effect for 5-7days)  Warfarin doses held 1/30, 1/31, 2/1, 2/2 - restarted 2/3   Continue warfarin 10mg daily  Monitor for s/sx of bleeding, dietary intake, drug/drug interactions, and clinical status.  Follow daily INR and adjust accordingly.     Discharge plan: Consider reducing to warfarin 10mg daily    Thank you,  Kenn Maynard [facility]  2/10/2020  8:27 AM       FAMILY HISTORY:  Mother  Still living? Unknown  Family history of diabetes mellitus, Age at diagnosis: Age Unknown  Family history of hypertension, Age at diagnosis: Age Unknown

## 2022-09-24 NOTE — ED ADULT NURSE REASSESSMENT NOTE - NS ED NURSE REASSESS COMMENT FT1
1035  Patient verbalized feeling better. Patient noted on the phone talking to family members. The patient is alert and oriented x4. Currently denying any discomfort or pain. The patient has been updated on the plan of care. Awaiting lab results.

## 2022-09-24 NOTE — ED PROVIDER NOTE - PHYSICAL EXAMINATION
GEN - diaphoretic; A&O x3   HEAD - NC/AT   EYES- PERRL, EOMI, pale conjunctiva  ENT: Airway patent, mmm, Oral cavity and pharynx normal. No inflammation, swelling, exudate, or lesions.   CARDIAC -s1s2, RRR, no M,G,R, No JVD  ABDOMEN - +BS, ND, NT, soft, no guarding, no rebound, no masses , no rigidity, +BRBPR on DEVAN  BACK - Normal  spine   EXTREMITIES - FROM, symmetric pulses, no edema, 5/5 strength in b/l UE and LE  SKIN - no rash or bruising   NEUROLOGIC - alert, speech clear, no focal deficits  PSYCH -nl mood/affect, nl insight. GEN +diaphoretic; A&O x3   HEAD - NC/AT   EYES- PERRL, EOMI, pale conjunctiva  ENT: Airway patent, mmm, Oral cavity and pharynx normal. No inflammation, swelling, exudate, or lesions.   CARDIAC -s1s2, RRR, no M,G,R, No JVD  ABDOMEN - +BS, ND, NT, soft, no guarding, no rebound, no masses , no rigidity, +BRBPR on DEVAN  BACK - Normal  spine   EXTREMITIES - FROM, symmetric pulses, no edema, 5/5 strength in b/l UE and LE  SKIN - no rash or bruising   NEUROLOGIC - alert, speech clear, no focal deficits  PSYCH -nl mood/affect, nl insight.

## 2022-09-24 NOTE — H&P ADULT - PROBLEM SELECTOR PLAN 1
Patient with 4 episodes of BRBPR x hours and resultant drop in Hb while in ED, hemodynamically stable (BPs elevated, HR 60s-70s) s/p 2U PRBCs in ED. Patient with 4 episodes of BRBPR x hours and resultant drop in Hb while in ED, hemodynamically stable (BPs elevated, HR 60s-70s) s/p 2U PRBCs in ED.  - GI consult for scope i/s/o likely diverticular bleed given CT finding of diverticulosis  - PPI 40 IV BID  - CLD  - Cards consult for pre-op clearance given recent stent  - 2 large bore IVs in place, active T&S, q8h CBCs, transfusion goal >8

## 2022-09-24 NOTE — H&P ADULT - ASSESSMENT
77M PMH CAD s/p 1stent ([facility] 12/2021), HLD, GERD, gout, DM, HTN, a-fib on Plavix presents for several episodes of BRBPR with no prodromal or postdromal symptoms reported. Patient with 2 point drop in Hb and CTAP revealing diverticulosis, LGIB likely in the setting of diverticulosis.

## 2022-09-24 NOTE — H&P ADULT - PROBLEM SELECTOR PLAN 2
Paitent with h/o hypertension on at home  high riding pressures in the ED without any secondary symptoms (no chest pain, SOB, HA etc.,) Baseline Cr 2.0-2.3, today 3.9, DDx includes: RADHA 2/2 blood loss, intrinsic kidney injury (given acidosis 2/2 unclear etiology). Patient without h/o or current urinary issues   - Urine studies ordered, will follow up  - Will recheck CMP after blood products administered if no improvement, will consult nephro for further management

## 2022-09-24 NOTE — PATIENT PROFILE ADULT - FALL HARM RISK - HARM RISK INTERVENTIONS

## 2022-09-24 NOTE — ED PROVIDER NOTE - NS ED ROS FT
ROS   GENERAL: No fever, no chills, + fatigue   RESPIRATORY: No cough, no dyspnea, no pleuritic chest pain   HEART: no chest pain, no palpitations, no edema, no jvd   ABDOMEN: No abdominal pain, + nausea, no vomiting, no diarrhea, +hematochezia  : No dysuria, no increase frequency, no urgency, No discharge   MUSCULAR-SKELETAL: No myalgia, no arthralgia   NEUROLOGY: No headache, no vertigo, no paresthesia, no focal deficits, no diplopia   SKIN: No rash, no evidence of trauma  All other ROS are negative

## 2022-09-24 NOTE — ED ADULT NURSE NOTE - OBJECTIVE STATEMENT
76 y/o male presenting to trauma c. Patient coming to ER complaining of rectal bleeding. Pt medical history of BPH, HTN, gout, stent placement x2. Patient states the bleeding began this morning and it is bright red in color. Pt denies chest pain, headache and dizziness. Noted to be unsteady on feet at this time. brought directly to [facility] from triage. #20g placed to R wrist. #18g placed to LAC. Labs severiano and sent as per MD order. IM zofran given for nausea. Patient arrives with clonidine patch to left upper arm. Skin intact.

## 2022-09-24 NOTE — H&P ADULT - HISTORY OF PRESENT ILLNESS
78 yo M pmh CAD s/p 1stent ([facility] 12/2021), HLD, GERD, gout, DM, HTN, a-fib on plavix presents complaining of sudden onset BRBPR this morning. Patient has had 4 episodes total today, 2 at home and two while in the ED, states that every time the blood has filled the toilet bowl. Denies any prodromal chest pain, shortness of breath, palpitations or sensations of irregular heart rate,, abdominal pain, cramping, nausea, vomiting, diarrhea blood in urine or stool prior to episodes today. No recent NSAID use, history of GI bleeds, personal or family history of cancer. Last colonoscopy 3 years ago showed polyps that required clips.

## 2022-09-24 NOTE — ED PROVIDER NOTE - CLINICAL SUMMARY MEDICAL DECISION MAKING FREE TEXT BOX
76 yo M pmh CAD s/p stents ([facility] 12/2022), HLD, GERD, DM, HTN, a-fib on plavix presents complaining of sudden onset BRBPR this morning. States colonoscopy 3 years ago showed polyps that required clips. Denies recent NSAID use, no chronic etoh use, smoking, drug use, hx gi bleeds or ca. Likely LGIB, UGIB unlikely given hx and exam. Source likely polyps vs mass vs diverticula. Obtain cbc, cmp, tni, ekg, vbg, lactate, lipase, tns, pt, ptt, inr, CT a/p. Symptomatic control prn. Admit.

## 2022-09-24 NOTE — ED PROVIDER NOTE - OBJECTIVE STATEMENT
76 yo M pmh CAD s/p stents ([facility] 12/2022), HLD, GERD, gout, DM, HTN, a-fib on plavix presents complaining of sudden onset BRBPR this morning. States colonoscopy 3 years ago showed polyps that required clips. Denies recent NSAID use, no chronic etoh use, smoking, drug use, hx gi bleeds or ca. Endorses associated nausea without vomiting, diarrhea, or abd pain. No medications taken for his symptoms. On presentation pt states he is feeling dizzy and sweaty. No palliative or provocative factors. No other current complaints at this time.

## 2022-09-24 NOTE — ED PROVIDER NOTE - ATTENDING CONTRIBUTION TO CARE
I have personally performed a history and physical examination of the patient and discussed management with the resident as well as the patient.  I reviewed the resident's note and agree with the documented findings and plan of care.  I have authored and modified critical sections of the Provider Note, including but not limited to HPI, Physical Exam and MDM.    78 yo M pmh CAD s/p stents ([facility] 12/2022), HLD, GERD, DM, HTN, a-fib on plavix presents complaining of sudden onset BRBPR this morning. States colonoscopy 3 years ago showed polyps that required clips. Denies recent NSAID use, no chronic etoh use, smoking, drug use, hx gi bleeds or ca. Likely LGIB, UGIB unlikely given hx and exam. Source likely polyps vs mass vs diverticula. Obtain cbc, cmp, tni, ekg, vbg, lactate, lipase, tns, pt, ptt, inr, CT a/p. Symptomatic control prn. Admit.

## 2022-09-24 NOTE — ED PROVIDER NOTE - NSICDXPASTMEDICALHX_GEN_ALL_CORE_FT
PAST MEDICAL HISTORY:  BPH (benign prostatic hyperplasia)     DM (diabetes mellitus)     Gout     HTN (hypertension)

## 2022-09-24 NOTE — H&P ADULT - PROBLEM SELECTOR PLAN 3
Hi  A1c ordered  LÓPEZ and uptitrate as needed Paitent with h/o hypertension on at home  high riding pressures in the ED without any secondary symptoms (no chest pain, SOB, HA etc.,) Paitent with h/o hypertension on, on amlodipine/benazepril, labetalol and Coreg at home; high riding pressures (210s systolic) in the ED without any secondary symptoms (no chest pain, SOB, HA etc.,)  - Will hold off on acute correction and give home meds, CTM  - Holding coreg and labetalol i/s/o borderline bradycardia

## 2022-09-24 NOTE — H&P ADULT - NSHPLABSRESULTS_GEN_ALL_CORE
LABS:                         8.5    5.15  )-----------( 244      ( 24 Sep 2022 11:40 )             27.5     09-24    140  |  109<H>  |  52<H>  ----------------------------<  260<H>  4.9   |  16<L>  |  3.90<H>    Ca    9.2      24 Sep 2022 10:11  Phos  3.5     09-24  Mg     1.90     09-24    TPro  7.0  /  Alb  3.8  /  TBili  0.4  /  DBili  x   /  AST  12  /  ALT  7   /  AlkPhos  75  09-24    PT/INR - ( 24 Sep 2022 10:11 )   PT: 12.5 sec;   INR: 1.08 ratio         PTT - ( 24 Sep 2022 10:11 )  PTT:29.7 sec        Serum Pro-Brain Natriuretic Peptide: 853 pg/mL (09-24 @ 10:11)        RADIOLOGY, EKG & ADDITIONAL TESTS:

## 2022-09-24 NOTE — H&P ADULT - ATTENDING COMMENTS
76 yo gentlemen with h/o CAD s/p stents (12/22, on plavix), T2DM, HTN, Afib (not on AC) GERD, HLD now p/w sudden BRBPR that occurred earlier this morning. On admission stated he felt dizzy, sweaty and had nausea though no vomiting. Last colonoscopy 3 years ago, polyps required clipping.       Patient with symptomatic anemia 2/2 acute GI blood loss, Hb drop noted 10.3-->8.5, continue to monitor, will monitor hb Q8H, GI to be consulted, follow up recs,   Hypertensive continue amlodipine which patient takes at home, monitor BP for the time being.   Also w/RADHA, and uremic acidosis, recheck after transfusion, will get urine studies. If no improvement with transfusion and fluid resuscitation will consult nephrology  Continue the rest of the work up and management as stated above.

## 2022-09-25 LAB
A1C WITH ESTIMATED AVERAGE GLUCOSE RESULT: 7.6 % — HIGH (ref 4–5.6)
ALBUMIN SERPL ELPH-MCNC: 3 G/DL — LOW (ref 3.3–5)
ALBUMIN SERPL ELPH-MCNC: 3.1 G/DL — LOW (ref 3.3–5)
ALP SERPL-CCNC: 59 U/L — SIGNIFICANT CHANGE UP (ref 40–120)
ALP SERPL-CCNC: 60 U/L — SIGNIFICANT CHANGE UP (ref 40–120)
ALT FLD-CCNC: <5 U/L — LOW (ref 4–41)
ALT FLD-CCNC: <5 U/L — LOW (ref 4–41)
ANION GAP SERPL CALC-SCNC: 10 MMOL/L — SIGNIFICANT CHANGE UP (ref 7–14)
ANION GAP SERPL CALC-SCNC: 11 MMOL/L — SIGNIFICANT CHANGE UP (ref 7–14)
ANION GAP SERPL CALC-SCNC: 12 MMOL/L — SIGNIFICANT CHANGE UP (ref 7–14)
APPEARANCE UR: CLEAR — SIGNIFICANT CHANGE UP
APTT BLD: 28.5 SEC — SIGNIFICANT CHANGE UP (ref 27–36.3)
AST SERPL-CCNC: 10 U/L — SIGNIFICANT CHANGE UP (ref 4–40)
AST SERPL-CCNC: 11 U/L — SIGNIFICANT CHANGE UP (ref 4–40)
BACTERIA # UR AUTO: NEGATIVE — SIGNIFICANT CHANGE UP
BASOPHILS # BLD AUTO: 0.02 K/UL — SIGNIFICANT CHANGE UP (ref 0–0.2)
BASOPHILS # BLD AUTO: 0.02 K/UL — SIGNIFICANT CHANGE UP (ref 0–0.2)
BASOPHILS # BLD AUTO: 0.03 K/UL — SIGNIFICANT CHANGE UP (ref 0–0.2)
BASOPHILS NFR BLD AUTO: 0.2 % — SIGNIFICANT CHANGE UP (ref 0–2)
BASOPHILS NFR BLD AUTO: 0.3 % — SIGNIFICANT CHANGE UP (ref 0–2)
BASOPHILS NFR BLD AUTO: 0.4 % — SIGNIFICANT CHANGE UP (ref 0–2)
BILIRUB SERPL-MCNC: 0.3 MG/DL — SIGNIFICANT CHANGE UP (ref 0.2–1.2)
BILIRUB SERPL-MCNC: 0.5 MG/DL — SIGNIFICANT CHANGE UP (ref 0.2–1.2)
BILIRUB UR-MCNC: NEGATIVE — SIGNIFICANT CHANGE UP
BUN SERPL-MCNC: 54 MG/DL — HIGH (ref 7–23)
BUN SERPL-MCNC: 56 MG/DL — HIGH (ref 7–23)
BUN SERPL-MCNC: 57 MG/DL — HIGH (ref 7–23)
CALCIUM SERPL-MCNC: 8.3 MG/DL — LOW (ref 8.4–10.5)
CALCIUM SERPL-MCNC: 8.5 MG/DL — SIGNIFICANT CHANGE UP (ref 8.4–10.5)
CALCIUM SERPL-MCNC: 8.7 MG/DL — SIGNIFICANT CHANGE UP (ref 8.4–10.5)
CHLORIDE SERPL-SCNC: 106 MMOL/L — SIGNIFICANT CHANGE UP (ref 98–107)
CHLORIDE SERPL-SCNC: 109 MMOL/L — HIGH (ref 98–107)
CHLORIDE SERPL-SCNC: 110 MMOL/L — HIGH (ref 98–107)
CO2 SERPL-SCNC: 18 MMOL/L — LOW (ref 22–31)
CO2 SERPL-SCNC: 18 MMOL/L — LOW (ref 22–31)
CO2 SERPL-SCNC: 19 MMOL/L — LOW (ref 22–31)
COLOR SPEC: SIGNIFICANT CHANGE UP
CREAT ?TM UR-MCNC: 130 MG/DL — SIGNIFICANT CHANGE UP
CREAT SERPL-MCNC: 3.83 MG/DL — HIGH (ref 0.5–1.3)
CREAT SERPL-MCNC: 3.88 MG/DL — HIGH (ref 0.5–1.3)
CREAT SERPL-MCNC: 3.9 MG/DL — HIGH (ref 0.5–1.3)
DIFF PNL FLD: ABNORMAL
EGFR: 15 ML/MIN/1.73M2 — LOW
EOSINOPHIL # BLD AUTO: 0.06 K/UL — SIGNIFICANT CHANGE UP (ref 0–0.5)
EOSINOPHIL # BLD AUTO: 0.09 K/UL — SIGNIFICANT CHANGE UP (ref 0–0.5)
EOSINOPHIL # BLD AUTO: 0.15 K/UL — SIGNIFICANT CHANGE UP (ref 0–0.5)
EOSINOPHIL NFR BLD AUTO: 0.7 % — SIGNIFICANT CHANGE UP (ref 0–6)
EOSINOPHIL NFR BLD AUTO: 1.1 % — SIGNIFICANT CHANGE UP (ref 0–6)
EOSINOPHIL NFR BLD AUTO: 2.2 % — SIGNIFICANT CHANGE UP (ref 0–6)
EPI CELLS # UR: 1 /HPF — SIGNIFICANT CHANGE UP (ref 0–5)
ESTIMATED AVERAGE GLUCOSE: 171 — SIGNIFICANT CHANGE UP
GLUCOSE BLDC GLUCOMTR-MCNC: 104 MG/DL — HIGH (ref 70–99)
GLUCOSE BLDC GLUCOMTR-MCNC: 119 MG/DL — HIGH (ref 70–99)
GLUCOSE BLDC GLUCOMTR-MCNC: 140 MG/DL — HIGH (ref 70–99)
GLUCOSE BLDC GLUCOMTR-MCNC: 141 MG/DL — HIGH (ref 70–99)
GLUCOSE BLDC GLUCOMTR-MCNC: 183 MG/DL — HIGH (ref 70–99)
GLUCOSE SERPL-MCNC: 110 MG/DL — HIGH (ref 70–99)
GLUCOSE SERPL-MCNC: 126 MG/DL — HIGH (ref 70–99)
GLUCOSE SERPL-MCNC: 190 MG/DL — HIGH (ref 70–99)
GLUCOSE UR QL: ABNORMAL
HCT VFR BLD CALC: 25.6 % — LOW (ref 39–50)
HCT VFR BLD CALC: 26.6 % — LOW (ref 39–50)
HCT VFR BLD CALC: 26.7 % — LOW (ref 39–50)
HCT VFR BLD CALC: 28.4 % — LOW (ref 39–50)
HGB BLD-MCNC: 8.5 G/DL — LOW (ref 13–17)
HGB BLD-MCNC: 8.7 G/DL — LOW (ref 13–17)
HGB BLD-MCNC: 9 G/DL — LOW (ref 13–17)
HGB BLD-MCNC: 9.5 G/DL — LOW (ref 13–17)
IANC: 4.39 K/UL — SIGNIFICANT CHANGE UP (ref 1.8–7.4)
IANC: 5.37 K/UL — SIGNIFICANT CHANGE UP (ref 1.8–7.4)
IANC: 6.15 K/UL — SIGNIFICANT CHANGE UP (ref 1.8–7.4)
IMM GRANULOCYTES NFR BLD AUTO: 0.3 % — SIGNIFICANT CHANGE UP (ref 0–0.9)
IMM GRANULOCYTES NFR BLD AUTO: 0.3 % — SIGNIFICANT CHANGE UP (ref 0–0.9)
IMM GRANULOCYTES NFR BLD AUTO: 0.4 % — SIGNIFICANT CHANGE UP (ref 0–0.9)
INR BLD: 1.11 RATIO — SIGNIFICANT CHANGE UP (ref 0.88–1.16)
INR BLD: 1.11 RATIO — SIGNIFICANT CHANGE UP (ref 0.88–1.16)
KETONES UR-MCNC: NEGATIVE — SIGNIFICANT CHANGE UP
LEUKOCYTE ESTERASE UR-ACNC: NEGATIVE — SIGNIFICANT CHANGE UP
LYMPHOCYTES # BLD AUTO: 1.49 K/UL — SIGNIFICANT CHANGE UP (ref 1–3.3)
LYMPHOCYTES # BLD AUTO: 1.69 K/UL — SIGNIFICANT CHANGE UP (ref 1–3.3)
LYMPHOCYTES # BLD AUTO: 1.77 K/UL — SIGNIFICANT CHANGE UP (ref 1–3.3)
LYMPHOCYTES # BLD AUTO: 18.2 % — SIGNIFICANT CHANGE UP (ref 13–44)
LYMPHOCYTES # BLD AUTO: 21.5 % — SIGNIFICANT CHANGE UP (ref 13–44)
LYMPHOCYTES # BLD AUTO: 25.5 % — SIGNIFICANT CHANGE UP (ref 13–44)
MAGNESIUM SERPL-MCNC: 1.6 MG/DL — SIGNIFICANT CHANGE UP (ref 1.6–2.6)
MAGNESIUM SERPL-MCNC: 1.7 MG/DL — SIGNIFICANT CHANGE UP (ref 1.6–2.6)
MCHC RBC-ENTMCNC: 27.5 PG — SIGNIFICANT CHANGE UP (ref 27–34)
MCHC RBC-ENTMCNC: 27.8 PG — SIGNIFICANT CHANGE UP (ref 27–34)
MCHC RBC-ENTMCNC: 28 PG — SIGNIFICANT CHANGE UP (ref 27–34)
MCHC RBC-ENTMCNC: 28 PG — SIGNIFICANT CHANGE UP (ref 27–34)
MCHC RBC-ENTMCNC: 32.7 GM/DL — SIGNIFICANT CHANGE UP (ref 32–36)
MCHC RBC-ENTMCNC: 33.2 GM/DL — SIGNIFICANT CHANGE UP (ref 32–36)
MCHC RBC-ENTMCNC: 33.5 GM/DL — SIGNIFICANT CHANGE UP (ref 32–36)
MCHC RBC-ENTMCNC: 33.7 GM/DL — SIGNIFICANT CHANGE UP (ref 32–36)
MCV RBC AUTO: 82.9 FL — SIGNIFICANT CHANGE UP (ref 80–100)
MCV RBC AUTO: 83 FL — SIGNIFICANT CHANGE UP (ref 80–100)
MCV RBC AUTO: 84.2 FL — SIGNIFICANT CHANGE UP (ref 80–100)
MCV RBC AUTO: 84.2 FL — SIGNIFICANT CHANGE UP (ref 80–100)
MONOCYTES # BLD AUTO: 0.44 K/UL — SIGNIFICANT CHANGE UP (ref 0–0.9)
MONOCYTES # BLD AUTO: 0.59 K/UL — SIGNIFICANT CHANGE UP (ref 0–0.9)
MONOCYTES # BLD AUTO: 0.66 K/UL — SIGNIFICANT CHANGE UP (ref 0–0.9)
MONOCYTES NFR BLD AUTO: 5.4 % — SIGNIFICANT CHANGE UP (ref 2–14)
MONOCYTES NFR BLD AUTO: 8.4 % — SIGNIFICANT CHANGE UP (ref 2–14)
MONOCYTES NFR BLD AUTO: 8.5 % — SIGNIFICANT CHANGE UP (ref 2–14)
NEUTROPHILS # BLD AUTO: 4.39 K/UL — SIGNIFICANT CHANGE UP (ref 1.8–7.4)
NEUTROPHILS # BLD AUTO: 5.37 K/UL — SIGNIFICANT CHANGE UP (ref 1.8–7.4)
NEUTROPHILS # BLD AUTO: 6.15 K/UL — SIGNIFICANT CHANGE UP (ref 1.8–7.4)
NEUTROPHILS NFR BLD AUTO: 63.2 % — SIGNIFICANT CHANGE UP (ref 43–77)
NEUTROPHILS NFR BLD AUTO: 68.3 % — SIGNIFICANT CHANGE UP (ref 43–77)
NEUTROPHILS NFR BLD AUTO: 75.1 % — SIGNIFICANT CHANGE UP (ref 43–77)
NITRITE UR-MCNC: NEGATIVE — SIGNIFICANT CHANGE UP
NRBC # BLD: 0 /100 WBCS — SIGNIFICANT CHANGE UP (ref 0–0)
NRBC # FLD: 0 K/UL — SIGNIFICANT CHANGE UP (ref 0–0)
OSMOLALITY SERPL: 312 MOSM/KG — HIGH (ref 275–295)
OSMOLALITY UR: 458 MOSM/KG — SIGNIFICANT CHANGE UP (ref 50–1200)
PH UR: 6 — SIGNIFICANT CHANGE UP (ref 5–8)
PHOSPHATE SERPL-MCNC: 3.1 MG/DL — SIGNIFICANT CHANGE UP (ref 2.5–4.5)
PHOSPHATE SERPL-MCNC: 3.5 MG/DL — SIGNIFICANT CHANGE UP (ref 2.5–4.5)
PLATELET # BLD AUTO: 209 K/UL — SIGNIFICANT CHANGE UP (ref 150–400)
PLATELET # BLD AUTO: 209 K/UL — SIGNIFICANT CHANGE UP (ref 150–400)
PLATELET # BLD AUTO: 224 K/UL — SIGNIFICANT CHANGE UP (ref 150–400)
PLATELET # BLD AUTO: 236 K/UL — SIGNIFICANT CHANGE UP (ref 150–400)
POTASSIUM SERPL-MCNC: 4.5 MMOL/L — SIGNIFICANT CHANGE UP (ref 3.5–5.3)
POTASSIUM SERPL-MCNC: 4.8 MMOL/L — SIGNIFICANT CHANGE UP (ref 3.5–5.3)
POTASSIUM SERPL-MCNC: 5.1 MMOL/L — SIGNIFICANT CHANGE UP (ref 3.5–5.3)
POTASSIUM SERPL-SCNC: 4.5 MMOL/L — SIGNIFICANT CHANGE UP (ref 3.5–5.3)
POTASSIUM SERPL-SCNC: 4.8 MMOL/L — SIGNIFICANT CHANGE UP (ref 3.5–5.3)
POTASSIUM SERPL-SCNC: 5.1 MMOL/L — SIGNIFICANT CHANGE UP (ref 3.5–5.3)
POTASSIUM UR-SCNC: 44.9 MMOL/L — SIGNIFICANT CHANGE UP
PROT ?TM UR-MCNC: 254 MG/DL — SIGNIFICANT CHANGE UP
PROT SERPL-MCNC: 5.2 G/DL — LOW (ref 6–8.3)
PROT SERPL-MCNC: 5.5 G/DL — LOW (ref 6–8.3)
PROT UR-MCNC: ABNORMAL
PROT/CREAT UR-RTO: 1.9 RATIO — HIGH (ref 0–0.2)
PROTHROM AB SERPL-ACNC: 12.9 SEC — SIGNIFICANT CHANGE UP (ref 10.5–13.4)
PROTHROM AB SERPL-ACNC: 12.9 SEC — SIGNIFICANT CHANGE UP (ref 10.5–13.4)
RBC # BLD: 3.04 M/UL — LOW (ref 4.2–5.8)
RBC # BLD: 3.16 M/UL — LOW (ref 4.2–5.8)
RBC # BLD: 3.22 M/UL — LOW (ref 4.2–5.8)
RBC # BLD: 3.42 M/UL — LOW (ref 4.2–5.8)
RBC # FLD: 14.1 % — SIGNIFICANT CHANGE UP (ref 10.3–14.5)
RBC # FLD: 14.1 % — SIGNIFICANT CHANGE UP (ref 10.3–14.5)
RBC # FLD: 14.2 % — SIGNIFICANT CHANGE UP (ref 10.3–14.5)
RBC # FLD: 14.3 % — SIGNIFICANT CHANGE UP (ref 10.3–14.5)
RBC CASTS # UR COMP ASSIST: 3 /HPF — SIGNIFICANT CHANGE UP (ref 0–4)
SODIUM SERPL-SCNC: 134 MMOL/L — LOW (ref 135–145)
SODIUM SERPL-SCNC: 138 MMOL/L — SIGNIFICANT CHANGE UP (ref 135–145)
SODIUM SERPL-SCNC: 141 MMOL/L — SIGNIFICANT CHANGE UP (ref 135–145)
SODIUM UR-SCNC: <20 MMOL/L — SIGNIFICANT CHANGE UP
SP GR SPEC: 1.04 — SIGNIFICANT CHANGE UP (ref 1.01–1.05)
UROBILINOGEN FLD QL: SIGNIFICANT CHANGE UP
UUN UR-MCNC: 611.5 MG/DL — SIGNIFICANT CHANGE UP
WBC # BLD: 6.94 K/UL — SIGNIFICANT CHANGE UP (ref 3.8–10.5)
WBC # BLD: 7.86 K/UL — SIGNIFICANT CHANGE UP (ref 3.8–10.5)
WBC # BLD: 8.19 K/UL — SIGNIFICANT CHANGE UP (ref 3.8–10.5)
WBC # BLD: 9.03 K/UL — SIGNIFICANT CHANGE UP (ref 3.8–10.5)
WBC # FLD AUTO: 6.94 K/UL — SIGNIFICANT CHANGE UP (ref 3.8–10.5)
WBC # FLD AUTO: 7.86 K/UL — SIGNIFICANT CHANGE UP (ref 3.8–10.5)
WBC # FLD AUTO: 8.19 K/UL — SIGNIFICANT CHANGE UP (ref 3.8–10.5)
WBC # FLD AUTO: 9.03 K/UL — SIGNIFICANT CHANGE UP (ref 3.8–10.5)
WBC UR QL: 2 /HPF — SIGNIFICANT CHANGE UP (ref 0–5)

## 2022-09-25 PROCEDURE — 99222 1ST HOSP IP/OBS MODERATE 55: CPT | Mod: GC

## 2022-09-25 PROCEDURE — 99233 SBSQ HOSP IP/OBS HIGH 50: CPT | Mod: GC

## 2022-09-25 RX ORDER — DEXTROSE 50 % IN WATER 50 %
15 SYRINGE (ML) INTRAVENOUS ONCE
Refills: 0 | Status: DISCONTINUED | OUTPATIENT
Start: 2022-09-25 | End: 2022-09-26

## 2022-09-25 RX ORDER — ASPIRIN/CALCIUM CARB/MAGNESIUM 324 MG
81 TABLET ORAL DAILY
Refills: 0 | Status: DISCONTINUED | OUTPATIENT
Start: 2022-09-25 | End: 2022-09-26

## 2022-09-25 RX ORDER — SODIUM CHLORIDE 9 MG/ML
1000 INJECTION, SOLUTION INTRAVENOUS
Refills: 0 | Status: DISCONTINUED | OUTPATIENT
Start: 2022-09-25 | End: 2022-09-25

## 2022-09-25 RX ORDER — DEXTROSE 50 % IN WATER 50 %
25 SYRINGE (ML) INTRAVENOUS ONCE
Refills: 0 | Status: DISCONTINUED | OUTPATIENT
Start: 2022-09-25 | End: 2022-09-27

## 2022-09-25 RX ORDER — SODIUM CHLORIDE 9 MG/ML
1000 INJECTION, SOLUTION INTRAVENOUS
Refills: 0 | Status: DISCONTINUED | OUTPATIENT
Start: 2022-09-25 | End: 2022-09-26

## 2022-09-25 RX ORDER — DEXTROSE 50 % IN WATER 50 %
12.5 SYRINGE (ML) INTRAVENOUS ONCE
Refills: 0 | Status: DISCONTINUED | OUTPATIENT
Start: 2022-09-25 | End: 2022-09-26

## 2022-09-25 RX ORDER — INSULIN LISPRO 100/ML
VIAL (ML) SUBCUTANEOUS
Refills: 0 | Status: DISCONTINUED | OUTPATIENT
Start: 2022-09-25 | End: 2022-09-26

## 2022-09-25 RX ORDER — INSULIN LISPRO 100/ML
VIAL (ML) SUBCUTANEOUS AT BEDTIME
Refills: 0 | Status: DISCONTINUED | OUTPATIENT
Start: 2022-09-25 | End: 2022-09-26

## 2022-09-25 RX ORDER — CARVEDILOL PHOSPHATE 80 MG/1
12.5 CAPSULE, EXTENDED RELEASE ORAL EVERY 12 HOURS
Refills: 0 | Status: DISCONTINUED | OUTPATIENT
Start: 2022-09-25 | End: 2022-09-25

## 2022-09-25 RX ORDER — GLUCAGON INJECTION, SOLUTION 0.5 MG/.1ML
1 INJECTION, SOLUTION SUBCUTANEOUS ONCE
Refills: 0 | Status: DISCONTINUED | OUTPATIENT
Start: 2022-09-25 | End: 2022-09-26

## 2022-09-25 RX ADMIN — GABAPENTIN 400 MILLIGRAM(S): 400 CAPSULE ORAL at 11:17

## 2022-09-25 RX ADMIN — PANTOPRAZOLE SODIUM 40 MILLIGRAM(S): 20 TABLET, DELAYED RELEASE ORAL at 06:20

## 2022-09-25 RX ADMIN — Medication 100 MILLIGRAM(S): at 11:17

## 2022-09-25 RX ADMIN — PANTOPRAZOLE SODIUM 40 MILLIGRAM(S): 20 TABLET, DELAYED RELEASE ORAL at 17:10

## 2022-09-25 RX ADMIN — Medication 20 MILLIGRAM(S): at 03:04

## 2022-09-25 RX ADMIN — Medication 2: at 13:08

## 2022-09-25 RX ADMIN — SODIUM CHLORIDE 500 MILLILITER(S): 9 INJECTION, SOLUTION INTRAVENOUS at 22:44

## 2022-09-25 RX ADMIN — CARVEDILOL PHOSPHATE 12.5 MILLIGRAM(S): 80 CAPSULE, EXTENDED RELEASE ORAL at 18:44

## 2022-09-25 NOTE — PROGRESS NOTE ADULT - PROBLEM SELECTOR PLAN 3
Paitent with h/o hypertension on, on amlodipine/benazepril, labetalol and Coreg at home; high riding pressures (210s systolic) in the ED without any secondary symptoms (no chest pain, SOB, HA etc.,)  - Will hold off on acute correction and give home meds, CTM  - Holding coreg and labetalol i/s/o borderline bradycardia Paitent with h/o hypertension on, on amlodipine/benazepril, labetalol and Coreg at home; high riding pressures (210s systolic) in the ED without any secondary symptoms (no chest pain, SOB, HA etc.,)  - Will hold off on acute correction and give home meds, CTM  - Continued home coreg  - Holding labetalol i/s/o borderline bradycardia

## 2022-09-25 NOTE — CONSULT NOTE ADULT - SUBJECTIVE AND OBJECTIVE BOX
Chief Complaint:  Patient is a 77y old  Male who presents with a chief complaint of     HPI: 76 yo M pmh CAD s/p 1 stent in 2021 (on plavix- last dose ***), HLD, GERD, gout, DM, HTN, Afib (?AC) presents complaining of sudden onset BRBPR x1 day. Patient has had 4 episodes total in last 24-48 h, 2 at home and two while in the ED. Denies any prodromal chest pain, shortness of breath, palpitations, abdominal pain, cramping, nausea, vomiting, diarrhea blood in urine or stool prior to episodes today. No recent NSAID use, history of GI bleeds, personal or family history of cancer. Last colonoscopy 3 years ago with polyps s/p endoclips. In the ED, VSS, Hgb 10 -> 8 (BL 10-12), K 6.1, elevated trop. GI consulted for c/f LGIB.    Otherwise, patient denies fevers, chills, weight loss, dysphagia, odynophagia, early satiety, poor oral intake, abdominal pain, nausea, vomiting, diarrhea, melena, hematemesis, hematochezia, change in stool caliber, or family history of GI-related cancers.    Allergies:  No Known Allergies      Home Medications:    Hospital Medications:  acetaminophen     Tablet .. 650 milliGRAM(s) Oral every 6 hours PRN  allopurinol 100 milliGRAM(s) Oral daily  aluminum hydroxide/magnesium hydroxide/simethicone Suspension 30 milliLiter(s) Oral every 4 hours PRN  amLODIPine   Tablet 10 milliGRAM(s) Oral at bedtime  atorvastatin 20 milliGRAM(s) Oral at bedtime  furosemide   Injectable 20 milliGRAM(s) IV Push once  gabapentin 400 milliGRAM(s) Oral daily  melatonin 3 milliGRAM(s) Oral at bedtime PRN  ondansetron Injectable 4 milliGRAM(s) IV Push every 8 hours PRN  pantoprazole  Injectable 40 milliGRAM(s) IV Push two times a day      PMHX/PSHX:  DM (diabetes mellitus)    Gout    HTN (hypertension)    BPH (benign prostatic hyperplasia)    H/O eye surgery        Family history:  Family history of diabetes mellitus (Mother)    Family history of hypertension (Mother)     Denies any family history of GI-related disease or cancers.    Social History:   ETOH: denies  Tobacco: denies  Illicit drug use: denies    ROS: 14 point ROS negative unless otherwise stated in HPI      Vital Signs:  Vital Signs Last 24 Hrs  T(C): 36.3 (24 Sep 2022 19:43), Max: 36.8 (24 Sep 2022 10:09)  T(F): 97.4 (24 Sep 2022 19:43), Max: 98.2 (24 Sep 2022 10:09)  HR: 70 (24 Sep 2022 21:59) (58 - 79)  BP: 189/64 (24 Sep 2022 21:59) (141/87 - 189/64)  BP(mean): --  RR: 18 (24 Sep 2022 21:59) (16 - 18)  SpO2: 100% (24 Sep 2022 19:43) (99% - 100%)    Parameters below as of 24 Sep 2022 19:43  Patient On (Oxygen Delivery Method): room air      Daily Height in cm: 175.26 (24 Sep 2022 09:26)    Daily     PHYSICAL EXAM:     GENERAL:  Appears stated age, well-groomed, well-nourished, no distress  HEENT:  NC/AT,  conjunctivae clear and pink  CHEST:  Full & symmetric excursion, no increased effort, breath sounds clear  HEART:  Regular rhythm, S1, S2, no murmur/rub/S3/S4  ABDOMEN:  Soft, non-tender, non-distended, normoactive bowel sounds,    RECTAL:   EXTREMITIES:  no cyanosis,clubbing or edema  SKIN:  No rash/erythema/ecchymoses/petechiae/wounds/abscess/warm/dry  NEURO:  Alert, orientedx3      LABS:                        8.5    5.15  )-----------( 244      ( 24 Sep 2022 11:40 )             27.5     09-24    136  |  107  |  53<H>  ----------------------------<  212<H>  6.1<H>   |  17<L>  |  3.88<H>    Ca    8.7      24 Sep 2022 11:40  Phos  3.5     09-24  Mg     1.90     09-24    TPro  5.9<L>  /  Alb  3.2<L>  /  TBili  0.2  /  DBili  x   /  AST  9   /  ALT  5   /  AlkPhos  61  09-24    LIVER FUNCTIONS - ( 24 Sep 2022 11:40 )  Alb: 3.2 g/dL / Pro: 5.9 g/dL / ALK PHOS: 61 U/L / ALT: 5 U/L / AST: 9 U/L / GGT: x           PT/INR - ( 24 Sep 2022 10:11 )   PT: 12.5 sec;   INR: 1.08 ratio         PTT - ( 24 Sep 2022 10:11 )  PTT:29.7 sec    Amylase Serum--      Lipase serum55       Ammonia--      Imaging:       ACC: [id number] EXAM:  CT ABDOMEN AND PELVIS [facility]                          PROCEDURE DATE:  09/24/2022          INTERPRETATION:  CLINICAL INFORMATION: Evaluate for GI bleed    COMPARISON: None.    CONTRAST/COMPLICATIONS:  IV Contrast: Omnipaque 350 96 cc administered   9 cc discarded  Oral Contrast: NONE  Complications: None reported at time of study completion    PROCEDURE:  CT of the Abdomen and Pelvis was performed.  Precontrast, Arterial and Delayed phases were performed.  Sagittal and coronal reformats were performed.    FINDINGS:  LOWER CHEST: Aortic valve and coronary artery calcification.    LIVER: Cirrhosis.  BILE DUCTS: Normal caliber.  GALLBLADDER: Within normal limits.  SPLEEN: Within normal limits.  PANCREAS: Within normal limits.  ADRENALS: 2.5 cm dominant right adrenal adenoma as well as other adenomas   bilaterally.  KIDNEYS/URETERS: Symmetric enhancement. No hydronephrosis. Bilateral   hypodensities too small characterize.    BLADDER: Underdistended.  REPRODUCTIVE ORGANS: Prostate is enlarged.    BOWEL: Diffuse mucosal hyperemia of the large bowel and rectum. No   intraluminal extravasation of contrast to suggest a GI bleed. No bowel   obstruction. Appendix is normal. Colonic diverticulosis without acute   diverticulitis.  PERITONEUM: No ascites.  VESSELS: Atherosclerotic changes.  RETROPERITONEUM/LYMPH NODES: No lymphadenopathy.  ABDOMINAL WALL: Small bilateral fat-containing inguinal hernias.  BONES: Degenerative changes. Mild retrolisthesis L3 on L4.    IMPRESSION:    No CT evidence of active GI bleed.           Chief Complaint:  Patient is a 77y old  Male who presents with a chief complaint of     HPI: 76 yo M pmh CAD s/p 1 stent in 2021 (on plavix- last dose 9/22), HLD, GERD, gout, DM, HTN, Afib (?unclear AC) presents complaining of sudden onset BRBPR x1 day. Patient has had 4 episodes total in last 24-48 h, 2 at home and two while in the ED. Has never had rectal bleeding like this in the past. Reports 20 lb unintentional weight loss over the last few months due to poor appetite. Otherwise, patient denies fevers, chills, dysphagia, odynophagia, abdominal pain, nausea, vomiting, diarrhea, melena, hematemesis, or family history of GI-related cancers. No recent NSAID use, history of GI bleeds, personal or family history of cancer. Last colonoscopy 3 years ago with polyps s/p endoclips. Has never had an EGD before. In the ED, VSS, Hgb 10 -> 8 (BL 10-12), K 6.1, elevated trop. CT A/P showing diffuse mucosal hyperemia of the large bowel and rectum, no intraluminal extravasation of contrast to suggest a GI bleed, no bowel obstruction, colonic diverticulosis without acute diverticulitis. GI consulted for c/f LGIB.      Allergies:  No Known Allergies      Home Medications:    Hospital Medications:  acetaminophen     Tablet .. 650 milliGRAM(s) Oral every 6 hours PRN  allopurinol 100 milliGRAM(s) Oral daily  aluminum hydroxide/magnesium hydroxide/simethicone Suspension 30 milliLiter(s) Oral every 4 hours PRN  amLODIPine   Tablet 10 milliGRAM(s) Oral at bedtime  atorvastatin 20 milliGRAM(s) Oral at bedtime  furosemide   Injectable 20 milliGRAM(s) IV Push once  gabapentin 400 milliGRAM(s) Oral daily  melatonin 3 milliGRAM(s) Oral at bedtime PRN  ondansetron Injectable 4 milliGRAM(s) IV Push every 8 hours PRN  pantoprazole  Injectable 40 milliGRAM(s) IV Push two times a day      PMHX/PSHX:  DM (diabetes mellitus)    Gout    HTN (hypertension)    BPH (benign prostatic hyperplasia)    H/O eye surgery        Family history:  Family history of diabetes mellitus (Mother)    Family history of hypertension (Mother)     Denies any family history of GI-related disease or cancers.    Social History:   ETOH: denies  Tobacco: denies  Illicit drug use: denies    ROS: 14 point ROS negative unless otherwise stated in HPI      Vital Signs:  Vital Signs Last 24 Hrs  T(C): 36.3 (24 Sep 2022 19:43), Max: 36.8 (24 Sep 2022 10:09)  T(F): 97.4 (24 Sep 2022 19:43), Max: 98.2 (24 Sep 2022 10:09)  HR: 70 (24 Sep 2022 21:59) (58 - 79)  BP: 189/64 (24 Sep 2022 21:59) (141/87 - 189/64)  BP(mean): --  RR: 18 (24 Sep 2022 21:59) (16 - 18)  SpO2: 100% (24 Sep 2022 19:43) (99% - 100%)    Parameters below as of 24 Sep 2022 19:43  Patient On (Oxygen Delivery Method): room air      Daily Height in cm: 175.26 (24 Sep 2022 09:26)    Daily     PHYSICAL EXAM:     GENERAL:  Appears stated age, well-groomed, well-nourished, no distress  HEENT:  NC/AT,  conjunctivae clear and pink  CHEST:  Full & symmetric excursion, no increased effort, breath sounds clear  HEART:  Regular rhythm, S1, S2, no murmur/rub/S3/S4  ABDOMEN:  Soft, non-tender, non-distended, normoactive bowel sounds,    RECTAL:   EXTREMITIES:  no cyanosis,clubbing or edema  SKIN:  No rash/erythema/ecchymoses/petechiae/wounds/abscess/warm/dry  NEURO:  Alert, orientedx3      LABS:                        8.5    5.15  )-----------( 244      ( 24 Sep 2022 11:40 )             27.5     09-24    136  |  107  |  53<H>  ----------------------------<  212<H>  6.1<H>   |  17<L>  |  3.88<H>    Ca    8.7      24 Sep 2022 11:40  Phos  3.5     09-24  Mg     1.90     09-24    TPro  5.9<L>  /  Alb  3.2<L>  /  TBili  0.2  /  DBili  x   /  AST  9   /  ALT  5   /  AlkPhos  61  09-24    LIVER FUNCTIONS - ( 24 Sep 2022 11:40 )  Alb: 3.2 g/dL / Pro: 5.9 g/dL / ALK PHOS: 61 U/L / ALT: 5 U/L / AST: 9 U/L / GGT: x           PT/INR - ( 24 Sep 2022 10:11 )   PT: 12.5 sec;   INR: 1.08 ratio         PTT - ( 24 Sep 2022 10:11 )  PTT:29.7 sec    Amylase Serum--      Lipase serum55       Ammonia--      Imaging:       ACC: [id number] EXAM:  CT ABDOMEN AND PELVIS [facility]                          PROCEDURE DATE:  09/24/2022          INTERPRETATION:  CLINICAL INFORMATION: Evaluate for GI bleed    COMPARISON: None.    CONTRAST/COMPLICATIONS:  IV Contrast: Omnipaque 350 96 cc administered   9 cc discarded  Oral Contrast: NONE  Complications: None reported at time of study completion    PROCEDURE:  CT of the Abdomen and Pelvis was performed.  Precontrast, Arterial and Delayed phases were performed.  Sagittal and coronal reformats were performed.    FINDINGS:  LOWER CHEST: Aortic valve and coronary artery calcification.    LIVER: Cirrhosis.  BILE DUCTS: Normal caliber.  GALLBLADDER: Within normal limits.  SPLEEN: Within normal limits.  PANCREAS: Within normal limits.  ADRENALS: 2.5 cm dominant right adrenal adenoma as well as other adenomas   bilaterally.  KIDNEYS/URETERS: Symmetric enhancement. No hydronephrosis. Bilateral   hypodensities too small characterize.    BLADDER: Underdistended.  REPRODUCTIVE ORGANS: Prostate is enlarged.    BOWEL: Diffuse mucosal hyperemia of the large bowel and rectum. No   intraluminal extravasation of contrast to suggest a GI bleed. No bowel   obstruction. Appendix is normal. Colonic diverticulosis without acute   diverticulitis.  PERITONEUM: No ascites.  VESSELS: Atherosclerotic changes.  RETROPERITONEUM/LYMPH NODES: No lymphadenopathy.  ABDOMINAL WALL: Small bilateral fat-containing inguinal hernias.  BONES: Degenerative changes. Mild retrolisthesis L3 on L4.    IMPRESSION:    No CT evidence of active GI bleed.

## 2022-09-25 NOTE — CONSULT NOTE ADULT - ASSESSMENT
77M PMH CAD s/p 1stent ([facility] 12/2021), HLD, GERD, gout, DM, HTN, a-fib on Plavix presents for several episodes of BRBPR with no prodromal or postdromal symptoms reported. Patient with 2 point drop in Hb and CTAP revealing diverticulosis, LGIB likely in the setting of diverticulosis.      Problem/Plan - 1:  ·  Problem: Lower GI bleed.   ·  Plan: Patient with 4 episodes of BRBPR x hours and resultant drop in Hb while in ED, hemodynamically stable (BPs elevated, HR 60s-70s) s/p 2U PRBCs in ED.  - GI consult for scope i/s/o likely diverticular bleed given CT finding of diverticulosis  - PPI 40 IV BID  - CLD  - Cards consult for pre-op clearance given recent stent  - 2 large bore IVs in place, active T&S, q8h CBCs, transfusion goal >8.     Problem/Plan - 2:  ·  Problem: RADHA (acute kidney injury).   ·  Plan: Baseline Cr 2.0-2.3, today 3.9, DDx includes: RADHA 2/2 blood loss, intrinsic kidney injury (given acidosis 2/2 unclear etiology). Patient without h/o or current urinary issues   - Urine studies ordered, will follow up  - Will recheck CMP after blood products administered if no improvement, will consult nephro for further management.     Problem/Plan - 3:  ·  Problem: HTN (hypertension).   ·  Plan: Paitent with h/o hypertension on, on amlodipine/benazepril, labetalol and Coreg at home; high riding pressures (210s systolic) in the ED without any secondary symptoms (no chest pain, SOB, HA etc.,)  - Will hold off on acute correction and give home meds, CTM  - Holding coreg and labetalol i/s/o borderline bradycardia.     Problem/Plan - 4:  ·  Problem: DM (diabetes mellitus).   ·  Plan: History of DM on ozempic and tresiba   A1c ordered  LÓPEZ and uptitrate as needed.     Problem/Plan - 5:  ·  Problem: CAD (coronary artery disease).   ·  Plan: CAD s/p 1 stent on plavix. Will hold plavix i/s/o GIB.     Problem/Plan - 6:  ·  Problem: GERD (gastroesophageal reflux disease).   ·  Plan: PPI ordered.     Problem/Plan - 7:  ·  Problem: Need for prophylactic measure.   ·  Plan: DVT PPx: held i/s/o GI bleed  Diet:

## 2022-09-25 NOTE — PROGRESS NOTE ADULT - PROBLEM SELECTOR PLAN 2
Baseline Cr 2.0-2.3, today 3.9, DDx includes: RADHA 2/2 blood loss, intrinsic kidney injury (given acidosis 2/2 unclear etiology). Patient without h/o or current urinary issues   - Urine studies ordered, will follow up  - Will recheck CMP after blood products administered if no improvement, will consult nephro for further management Baseline Cr 2.0-2.3, today 3.9, DDx includes: RADHA 2/2 blood loss, intrinsic kidney injury (given acidosis 2/2 unclear etiology). Patient without h/o or current urinary issues   - Urine studies ordered, will follow up  - Will recheck CMP after blood products administered if no improvement, will consult nephro for further management  - Will call Pt's Neph Dr. Dobbs  Urine sodium is below 20. FeNa is 0.4%, suggesting pre-renal. However, not responsive to fluid resuscitation. Also has NAGMA iso of RADHA on CKD. Consult neph for whether further work up is required. In the mean while, give fluid.

## 2022-09-25 NOTE — PROGRESS NOTE ADULT - SUBJECTIVE AND OBJECTIVE BOX
Progress Note    22 (1d)    Patient is a 77y old  Male who presents with a chief complaint of     Subjective / Overnight Events :  - No acute events overnight.  - Pt seen and examined at bedside.     Additional ROS (if any):    MEDICATIONS  (STANDING):  allopurinol 100 milliGRAM(s) Oral daily  amLODIPine   Tablet 10 milliGRAM(s) Oral at bedtime  atorvastatin 20 milliGRAM(s) Oral at bedtime  gabapentin 400 milliGRAM(s) Oral daily  pantoprazole  Injectable 40 milliGRAM(s) IV Push two times a day    MEDICATIONS  (PRN):  acetaminophen     Tablet .. 650 milliGRAM(s) Oral every 6 hours PRN Temp greater or equal to 38C (100.4F), Mild Pain (1 - 3)  aluminum hydroxide/magnesium hydroxide/simethicone Suspension 30 milliLiter(s) Oral every 4 hours PRN Dyspepsia  melatonin 3 milliGRAM(s) Oral at bedtime PRN Insomnia  ondansetron Injectable 4 milliGRAM(s) IV Push every 8 hours PRN Nausea and/or Vomiting      CAPILLARY BLOOD GLUCOSE      POCT Blood Glucose.: 217 mg/dL (24 Sep 2022 22:39)  POCT Blood Glucose.: 175 mg/dL (24 Sep 2022 21:39)  POCT Blood Glucose.: 150 mg/dL (24 Sep 2022 17:44)  POCT Blood Glucose.: 247 mg/dL (24 Sep 2022 09:28)    I&O's Summary      PHYSICAL EXAM:  Vital Signs Last 24 Hrs  T(C): 36.4 (25 Sep 2022 06:17), Max: 36.8 (24 Sep 2022 10:09)  T(F): 97.6 (25 Sep 2022 06:17), Max: 98.2 (24 Sep 2022 10:09)  HR: 69 (25 Sep 2022 06:17) (58 - 79)  BP: 142/64 (25 Sep 2022 06:17) (141/87 - 189/64)  BP(mean): --  RR: 18 (25 Sep 2022 06:17) (16 - 18)  SpO2: 100% (25 Sep 2022 06:17) (99% - 100%)    Parameters below as of 25 Sep 2022 06:17  Patient On (Oxygen Delivery Method): room air        General: NAD, non-toxic appearing   HEENT: PERRLA, EOMi, no scleral icterus  CV: RRR, normal S1 and S2, no m/r/g  Lungs: normal respiratory effort. CTAB, no wheezes, rales, or rhonchi  Abd: soft, nontender, nondistended  Ext: no edema, 2+ peripheral pulses   Pysch: AAOx3, appropriate affect   Neuro: grossly non-focal, moving all extremities spontaneously   Skin: no rashes or lesions     LABS:                          9.5    7.86  )-----------( 224      ( 25 Sep 2022 04:20 )             28.4       WBC Trend: 7.86<--, 5.15<--, 7.64<--  Hb Trend: 9.5<--, 8.5<--, 10.3<--        141  |  110<H>  |  57<H>  ----------------------------<  126<H>  4.8   |  19<L>  |  3.88<H>    Ca    8.5      25 Sep 2022 04:20  Phos  3.1       Mg     1.70         TPro  5.5<L>  /  Alb  3.1<L>  /  TBili  0.5  /  DBili  x   /  AST  11  /  ALT  <5<L>  /  AlkPhos  60      PT/INR - ( 25 Sep 2022 04:20 )   PT: 12.9 sec;   INR: 1.11 ratio         PTT - ( 24 Sep 2022 10:11 )  PTT:29.7 sec      Urinalysis Basic - ( 24 Sep 2022 23:59 )    Color: Light Yellow / Appearance: Clear / S.037 / pH: x  Gluc: x / Ketone: Negative  / Bili: Negative / Urobili: <2 mg/dL   Blood: x / Protein: 300 mg/dL / Nitrite: Negative   Leuk Esterase: Negative / RBC: 3 /HPF / WBC 2 /HPF   Sq Epi: x / Non Sq Epi: 1 /HPF / Bacteria: Negative        Blood Gas Venous Comprehensive Result: Performed In Lab (22 @ 14:34)  Blood Gas Venous Comprehensive Result: Performed In Lab (22 @ 10:11)      RADIOLOGY & ADDITIONAL TESTS: Reviewed

## 2022-09-25 NOTE — CONSULT NOTE ADULT - SUBJECTIVE AND OBJECTIVE BOX
PATIENT SEEN AND EXAMINED ON :-9/25/22  DATE OF SERVICE:  9/25/22           Interim events noted,Labs , Radiological studies and Cardiology tests reviewed .       HOSPITAL COURSE: HPI:  78 yo M pmh CAD s/p 1stent ([facility] 12/2021), HLD, GERD, gout, DM, HTN, a-fib on plavix presents complaining of sudden onset BRBPR this morning. Patient has had 4 episodes total today, 2 at home and two while in the ED, states that every time the blood has filled the toilet bowl. Denies any prodromal chest pain, shortness of breath, palpitations or sensations of irregular heart rate,, abdominal pain, cramping, nausea, vomiting, diarrhea blood in urine or stool prior to episodes today. No recent NSAID use, history of GI bleeds, personal or family history of cancer. Last colonoscopy 3 years ago showed polyps that required clips.  (24 Sep 2022 17:53)      INTERIM EVENTS:Patient seen at bedside ,interim events noted.      PMH -reviewed admission note, no change since admission  HEART FAILURE: Acute[ ]Chronic[ ] Systolic[ ] Diastolic[ ] Combined Systolic and Diastolic[ ]  CAD[ ] CABG[ ] PCI[ ]  DEVICES[ ] PPM[ ] ICD[ ] ILR[ ]  ATRIAL FIBRILLATION[ ] Paroxysmal[ ] Permanent[ ] CHADS2-[  ]  RADHA[ ] CKD1[ ] CKD2[ ] CKD3[ ] CKD4[ ] ESRD[ ]  COPD[ ] HTN[ ]   DM[ ] Type1[ ] Type 2[ ]   CVA[ ] Paresis[ ]    AMBULATION: Assisted[ ] Cane/walker[ ] Independent[ ]    MEDICATIONS  (STANDING):  allopurinol 100 milliGRAM(s) Oral daily  amLODIPine   Tablet 10 milliGRAM(s) Oral at bedtime  aspirin  chewable 81 milliGRAM(s) Oral daily  atorvastatin 20 milliGRAM(s) Oral at bedtime  carvedilol 12.5 milliGRAM(s) Oral every 12 hours  dextrose 5%. 1000 milliLiter(s) (100 mL/Hr) IV Continuous <Continuous>  dextrose 5%. 1000 milliLiter(s) (50 mL/Hr) IV Continuous <Continuous>  dextrose 50% Injectable 25 Gram(s) IV Push once  dextrose 50% Injectable 12.5 Gram(s) IV Push once  dextrose 50% Injectable 25 Gram(s) IV Push once  gabapentin 400 milliGRAM(s) Oral daily  glucagon  Injectable 1 milliGRAM(s) IntraMuscular once  insulin lispro (ADMELOG) corrective regimen sliding scale   SubCutaneous three times a day before meals  insulin lispro (ADMELOG) corrective regimen sliding scale   SubCutaneous at bedtime  pantoprazole  Injectable 40 milliGRAM(s) IV Push two times a day    MEDICATIONS  (PRN):  acetaminophen     Tablet .. 650 milliGRAM(s) Oral every 6 hours PRN Temp greater or equal to 38C (100.4F), Mild Pain (1 - 3)  aluminum hydroxide/magnesium hydroxide/simethicone Suspension 30 milliLiter(s) Oral every 4 hours PRN Dyspepsia  dextrose Oral Gel 15 Gram(s) Oral once PRN Blood Glucose LESS THAN 70 milliGRAM(s)/deciliter  melatonin 3 milliGRAM(s) Oral at bedtime PRN Insomnia  ondansetron Injectable 4 milliGRAM(s) IV Push every 8 hours PRN Nausea and/or Vomiting            REVIEW OF SYSTEMS:  Constitutional: [ ] fever, [ ]weight loss,  [ ]fatigue [ ]weight gain  Eyes: [ ] visual changes  Respiratory: [ ]shortness of breath;  [ ] cough, [ ]wheezing, [ ]chills, [ ]hemoptysis  Cardiovascular: [ ] chest pain, [ ]palpitations, [ ]dizziness,  [ ]leg swelling[ ]orthopnea[ ]PND  Gastrointestinal: [ ] abdominal pain, [ ]nausea, [ ]vomiting,  [ ]diarrhea [ ]Constipation [ ]Melena  Genitourinary: [ ] dysuria, [ ] hematuria [ ]Dobbs  Neurologic: [ ] headaches [ ] tremors[ ]weakness [ ]Paralysis Right[ ] Left[ ]  Skin: [ ] itching, [ ]burning, [ ] rashes  Endocrine: [ ] heat or cold intolerance  Musculoskeletal: [ ] joint pain or swelling; [ ] muscle, back, or extremity pain  Psychiatric: [ ] depression, [ ]anxiety, [ ]mood swings, or [ ]difficulty sleeping  Hematologic: [ ] easy bruising, [ ] bleeding gums    [ ] All remaining systems negative except as per above.   [ ]Unable to obtain.  [x] No change in ROS since admission      Vital Signs Last 24 Hrs  T(C): 36.7 (25 Sep 2022 18:42), Max: 36.7 (25 Sep 2022 18:42)  T(F): 98 (25 Sep 2022 18:42), Max: 98 (25 Sep 2022 18:42)  HR: 63 (25 Sep 2022 18:42) (63 - 70)  BP: 175/70 (25 Sep 2022 18:42) (142/64 - 189/64)  BP(mean): --  RR: 18 (25 Sep 2022 18:42) (17 - 18)  SpO2: 100% (25 Sep 2022 18:42) (100% - 100%)    Parameters below as of 25 Sep 2022 18:42  Patient On (Oxygen Delivery Method): room air      I&O's Summary      PHYSICAL EXAM:  General: No acute distress BMI-  HEENT: EOMI, PERRL  Neck: Supple, [ ] JVD  Lungs: Equal air entry bilaterally; [ ] rales [ ] wheezing [ ] rhonchi  Heart: Regular rate and rhythm; [x ] murmur   2/6 [ x] systolic [ ] diastolic [ ] radiation[ ] rubs [ ]  gallops  Abdomen: Nontender, bowel sounds present  Extremities: No clubbing, cyanosis, [ ] edema [ ]Pulses  equal and intact  Nervous system:  Alert & Oriented X3, no focal deficits  Psychiatric: Normal affect  Skin: No rashes or lesions    LABS:  09-25    141  |  110<H>  |  57<H>  ----------------------------<  126<H>  4.8   |  19<L>  |  3.88<H>    Ca    8.5      25 Sep 2022 04:20  Phos  3.1     09-24  Mg     1.70     09-24    TPro  5.5<L>  /  Alb  3.1<L>  /  TBili  0.5  /  DBili  x   /  AST  11  /  ALT  <5<L>  /  AlkPhos  60  09-25    Creatinine Trend: 3.88<--, 3.83<--, 3.88<--, 3.90<--                        8.7    6.94  )-----------( 209      ( 25 Sep 2022 20:13 )             26.6     PT/INR - ( 25 Sep 2022 04:20 )   PT: 12.9 sec;   INR: 1.11 ratio         PTT - ( 24 Sep 2022 10:11 )  PTT:29.7 sec    Serum Pro-Brain Natriuretic Peptide: 853 pg/mL (09-24-22 @ 10:11)

## 2022-09-25 NOTE — CONSULT NOTE ADULT - ASSESSMENT
#LGIB  Differential diagnosis remains broad and includes diverticulosis with hemorrhage, angioectasias/AVMs, hemorrhoids.    #CAD s/p 1 stent in 2021 (on plavix- last dose ***)  #Afib (?AC)    Recommendations:  -trend vitals, CBC, and monitor for clinical signs of bleeding  -maintain active type and screen  -transfusion goal to maintain hemoglobin >/= 8.0  -rule out other causes for anemia [order iron studies, ferritin, vitamin B12, folate]  -avoid NSAIDs  -PPI IV BID acceptable for now though low suspicion for UGIB at this time  -clear liquid diet for now    **THIS NOTE IS NOT FINALIZED UNTIL SIGNED BY THE ATTENDING**    Madalyn Aguilar MD  GI Fellow, PGY-5  Available via Microsoft Teams    NON-URGENT CONSULTS:  Please email [email or web address]  AT NIGHT AND ON WEEKENDS:  Contact on-call GI fellow via answering service ([phone]) from 5pm-8am and on weekends/holidays  MONDAY-FRIDAY 8AM-5PM:  Pager# [phone] ([facility] or [phone] ([facility])  GI Phone# [phone] ([facility])         76 yo M pmh CAD s/p 1 stent in 2021 (on plavix- last dose 9/22), HLD, GERD, gout, DM, HTN, Afib (?unclear AC) presents complaining of sudden onset BRBPR x1 day. Patient has had 4 episodes total in last 24-48 h, 2 at home and two while in the ED. Has never had rectal bleeding like this in the past. Reports 20 lb unintentional weight loss over the last few months due to poor appetite. Last colonoscopy 3 years ago with polyps s/p endoclips. Has never had an EGD before. In the ED, VSS, Hgb 10 -> 8 (BL 10-12), K 6.1, elevated trop. CT A/P showing diffuse mucosal hyperemia of the large bowel and rectum, no intraluminal extravasation of contrast to suggest a GI bleed, no bowel obstruction, colonic diverticulosis without acute diverticulitis. GI consulted for c/f LGIB.    #LGIB  Differential diagnosis remains broad and includes diverticulosis with hemorrhage, angioectasias/AVMs, hemorrhoids.    #CAD s/p 1 stent in 2021 (on plavix- last dose 9/22)  #Afib (?unknown if on AC)    Recommendations:  -trend vitals, CBC, and monitor for clinical signs of bleeding  -maintain active type and screen  -transfusion goal to maintain hemoglobin >/= 8.0  -rule out other causes for anemia [order iron studies, ferritin, vitamin B12, folate]  -avoid NSAIDs  -PPI IV BID acceptable for now though low suspicion for UGIB at this time  -clear liquid diet for now  -recommend cardiology clearance given recent stent placement last year  -hold plavix if ok with cardiology, ok to c/w ASA (no GI contraindication to ASA)  -can pursue c-scope +/- EGD once cleared by cardiology    **THIS NOTE IS NOT FINALIZED UNTIL SIGNED BY THE ATTENDING**    Madalyn Aguilar MD  GI Fellow, PGY-5  Available via Microsoft Teams    NON-URGENT CONSULTS:  Please email [email or web address]  AT NIGHT AND ON WEEKENDS:  Contact on-call GI fellow via answering service ([phone]) from 5pm-8am and on weekends/holidays  MONDAY-FRIDAY 8AM-5PM:  Pager# [phone] ([facility]) or [phone] ([facility])  GI Phone# [phone] ([facility])

## 2022-09-25 NOTE — PROGRESS NOTE ADULT - PROBLEM SELECTOR PLAN 1
Patient with 4 episodes of BRBPR x hours and resultant drop in Hb while in ED, hemodynamically stable (BPs elevated, HR 60s-70s) s/p 2U PRBCs in ED.  - GI consult for scope i/s/o likely diverticular bleed given CT finding of diverticulosis  - PPI 40 IV BID  - CLD  - Cards consult for pre-op clearance given recent stent  - 2 large bore IVs in place, active T&S, q8h CBCs, transfusion goal >8 Patient with 4 episodes of BRBPR x hours and resultant drop in Hb while in ED, hemodynamically stable (BPs elevated, HR 60s-70s) s/p 2U PRBCs in ED.  - GI consult for scope i/s/o likely diverticular bleed given CT finding of diverticulosis  - PPI 40 IV BID  - CLD  - Cards Dr. Khanh Juan consulted for pre-op clearance given recent stent and DOAC  - 2 large bore IVs in place, active T&S, q8h CBCs, transfusion goal >8

## 2022-09-25 NOTE — PROGRESS NOTE ADULT - PROBLEM SELECTOR PLAN 5
CAD s/p 1 stent on plavix. Will hold plavix i/s/o GIB. CAD s/p 1 stent on plavix. Will hold plavix i/s/o GIB. Consult Card for this

## 2022-09-25 NOTE — CHART NOTE - NSCHARTNOTEFT_GEN_A_CORE
Unclear from cardiology note from today if pt is cleared/optimized from cardiac standpoint as there is no explicit mention of clearance.    Will hold off on prepping for c-scope pending cardiac clearance/optimization.

## 2022-09-26 LAB
ANION GAP SERPL CALC-SCNC: 13 MMOL/L — SIGNIFICANT CHANGE UP (ref 7–14)
ANION GAP SERPL CALC-SCNC: 9 MMOL/L — SIGNIFICANT CHANGE UP (ref 7–14)
BASE EXCESS BLDV CALC-SCNC: -10.6 MMOL/L — LOW (ref -2–3)
BLD GP AB SCN SERPL QL: NEGATIVE — SIGNIFICANT CHANGE UP
BUN SERPL-MCNC: 67 MG/DL — HIGH (ref 7–23)
BUN SERPL-MCNC: 70 MG/DL — HIGH (ref 7–23)
CA-I SERPL-SCNC: 1.21 MMOL/L — SIGNIFICANT CHANGE UP (ref 1.15–1.33)
CALCIUM SERPL-MCNC: 8.3 MG/DL — LOW (ref 8.4–10.5)
CALCIUM SERPL-MCNC: 8.3 MG/DL — LOW (ref 8.4–10.5)
CHLORIDE BLDV-SCNC: 106 MMOL/L — SIGNIFICANT CHANGE UP (ref 96–108)
CHLORIDE SERPL-SCNC: 106 MMOL/L — SIGNIFICANT CHANGE UP (ref 98–107)
CHLORIDE SERPL-SCNC: 108 MMOL/L — HIGH (ref 98–107)
CO2 BLDV-SCNC: 16.9 MMOL/L — LOW (ref 22–26)
CO2 SERPL-SCNC: 15 MMOL/L — LOW (ref 22–31)
CO2 SERPL-SCNC: 18 MMOL/L — LOW (ref 22–31)
CREAT SERPL-MCNC: 4.38 MG/DL — HIGH (ref 0.5–1.3)
CREAT SERPL-MCNC: 4.44 MG/DL — HIGH (ref 0.5–1.3)
EGFR: 13 ML/MIN/1.73M2 — LOW
EGFR: 13 ML/MIN/1.73M2 — LOW
GAS PNL BLDV: 130 MMOL/L — LOW (ref 136–145)
GAS PNL BLDV: SIGNIFICANT CHANGE UP
GAS PNL BLDV: SIGNIFICANT CHANGE UP
GLUCOSE BLDC GLUCOMTR-MCNC: 146 MG/DL — HIGH (ref 70–99)
GLUCOSE BLDC GLUCOMTR-MCNC: 155 MG/DL — HIGH (ref 70–99)
GLUCOSE BLDC GLUCOMTR-MCNC: 178 MG/DL — HIGH (ref 70–99)
GLUCOSE BLDC GLUCOMTR-MCNC: 208 MG/DL — HIGH (ref 70–99)
GLUCOSE BLDC GLUCOMTR-MCNC: 98 MG/DL — SIGNIFICANT CHANGE UP (ref 70–99)
GLUCOSE BLDV-MCNC: 170 MG/DL — HIGH (ref 70–99)
GLUCOSE SERPL-MCNC: 165 MG/DL — HIGH (ref 70–99)
GLUCOSE SERPL-MCNC: 97 MG/DL — SIGNIFICANT CHANGE UP (ref 70–99)
HCO3 BLDV-SCNC: 16 MMOL/L — LOW (ref 22–29)
HCT VFR BLD CALC: 23.1 % — LOW (ref 39–50)
HCT VFR BLD CALC: 24.3 % — LOW (ref 39–50)
HCT VFR BLD CALC: 25.3 % — LOW (ref 39–50)
HCT VFR BLD CALC: 26.3 % — LOW (ref 39–50)
HCT VFR BLD CALC: 27 % — LOW (ref 39–50)
HCT VFR BLD CALC: 28.6 % — LOW (ref 39–50)
HCT VFR BLDA CALC: 31 % — LOW (ref 39–51)
HGB BLD CALC-MCNC: 10.3 G/DL — LOW (ref 13–17)
HGB BLD-MCNC: 7.9 G/DL — LOW (ref 13–17)
HGB BLD-MCNC: 8.3 G/DL — LOW (ref 13–17)
HGB BLD-MCNC: 8.5 G/DL — LOW (ref 13–17)
HGB BLD-MCNC: 9 G/DL — LOW (ref 13–17)
HGB BLD-MCNC: 9.3 G/DL — LOW (ref 13–17)
HGB BLD-MCNC: 9.5 G/DL — LOW (ref 13–17)
LACTATE BLDV-MCNC: 2.3 MMOL/L — HIGH (ref 0.5–2)
MAGNESIUM SERPL-MCNC: 1.7 MG/DL — SIGNIFICANT CHANGE UP (ref 1.6–2.6)
MAGNESIUM SERPL-MCNC: 1.7 MG/DL — SIGNIFICANT CHANGE UP (ref 1.6–2.6)
MCHC RBC-ENTMCNC: 28.3 PG — SIGNIFICANT CHANGE UP (ref 27–34)
MCHC RBC-ENTMCNC: 28.5 PG — SIGNIFICANT CHANGE UP (ref 27–34)
MCHC RBC-ENTMCNC: 28.5 PG — SIGNIFICANT CHANGE UP (ref 27–34)
MCHC RBC-ENTMCNC: 28.7 PG — SIGNIFICANT CHANGE UP (ref 27–34)
MCHC RBC-ENTMCNC: 28.8 PG — SIGNIFICANT CHANGE UP (ref 27–34)
MCHC RBC-ENTMCNC: 28.9 PG — SIGNIFICANT CHANGE UP (ref 27–34)
MCHC RBC-ENTMCNC: 33.2 GM/DL — SIGNIFICANT CHANGE UP (ref 32–36)
MCHC RBC-ENTMCNC: 33.6 GM/DL — SIGNIFICANT CHANGE UP (ref 32–36)
MCHC RBC-ENTMCNC: 34.2 GM/DL — SIGNIFICANT CHANGE UP (ref 32–36)
MCHC RBC-ENTMCNC: 34.4 GM/DL — SIGNIFICANT CHANGE UP (ref 32–36)
MCV RBC AUTO: 83.4 FL — SIGNIFICANT CHANGE UP (ref 80–100)
MCV RBC AUTO: 83.9 FL — SIGNIFICANT CHANGE UP (ref 80–100)
MCV RBC AUTO: 84 FL — SIGNIFICANT CHANGE UP (ref 80–100)
MCV RBC AUTO: 84.1 FL — SIGNIFICANT CHANGE UP (ref 80–100)
MCV RBC AUTO: 84.3 FL — SIGNIFICANT CHANGE UP (ref 80–100)
MCV RBC AUTO: 85.9 FL — SIGNIFICANT CHANGE UP (ref 80–100)
NRBC # BLD: 0 /100 WBCS — SIGNIFICANT CHANGE UP (ref 0–0)
NRBC # FLD: 0 K/UL — SIGNIFICANT CHANGE UP (ref 0–0)
PCO2 BLDV: 36 MMHG — LOW (ref 42–55)
PH BLDV: 7.25 — LOW (ref 7.32–7.43)
PHOSPHATE SERPL-MCNC: 4 MG/DL — SIGNIFICANT CHANGE UP (ref 2.5–4.5)
PHOSPHATE SERPL-MCNC: 4.2 MG/DL — SIGNIFICANT CHANGE UP (ref 2.5–4.5)
PLATELET # BLD AUTO: 182 K/UL — SIGNIFICANT CHANGE UP (ref 150–400)
PLATELET # BLD AUTO: 197 K/UL — SIGNIFICANT CHANGE UP (ref 150–400)
PLATELET # BLD AUTO: 200 K/UL — SIGNIFICANT CHANGE UP (ref 150–400)
PLATELET # BLD AUTO: 204 K/UL — SIGNIFICANT CHANGE UP (ref 150–400)
PLATELET # BLD AUTO: 212 K/UL — SIGNIFICANT CHANGE UP (ref 150–400)
PLATELET # BLD AUTO: 233 K/UL — SIGNIFICANT CHANGE UP (ref 150–400)
PO2 BLDV: 47 MMHG — SIGNIFICANT CHANGE UP
POTASSIUM BLDV-SCNC: 5 MMOL/L — SIGNIFICANT CHANGE UP (ref 3.5–5.1)
POTASSIUM SERPL-MCNC: 5.1 MMOL/L — SIGNIFICANT CHANGE UP (ref 3.5–5.3)
POTASSIUM SERPL-MCNC: 6.1 MMOL/L — HIGH (ref 3.5–5.3)
POTASSIUM SERPL-SCNC: 5.1 MMOL/L — SIGNIFICANT CHANGE UP (ref 3.5–5.3)
POTASSIUM SERPL-SCNC: 6.1 MMOL/L — HIGH (ref 3.5–5.3)
RBC # BLD: 2.77 M/UL — LOW (ref 4.2–5.8)
RBC # BLD: 2.89 M/UL — LOW (ref 4.2–5.8)
RBC # BLD: 3 M/UL — LOW (ref 4.2–5.8)
RBC # BLD: 3.13 M/UL — LOW (ref 4.2–5.8)
RBC # BLD: 3.22 M/UL — LOW (ref 4.2–5.8)
RBC # BLD: 3.33 M/UL — LOW (ref 4.2–5.8)
RBC # FLD: 13.5 % — SIGNIFICANT CHANGE UP (ref 10.3–14.5)
RBC # FLD: 13.7 % — SIGNIFICANT CHANGE UP (ref 10.3–14.5)
RBC # FLD: 14 % — SIGNIFICANT CHANGE UP (ref 10.3–14.5)
RBC # FLD: 14.1 % — SIGNIFICANT CHANGE UP (ref 10.3–14.5)
RH IG SCN BLD-IMP: POSITIVE — SIGNIFICANT CHANGE UP
SAO2 % BLDV: 79.7 % — SIGNIFICANT CHANGE UP
SODIUM SERPL-SCNC: 134 MMOL/L — LOW (ref 135–145)
SODIUM SERPL-SCNC: 135 MMOL/L — SIGNIFICANT CHANGE UP (ref 135–145)
WBC # BLD: 10.05 K/UL — SIGNIFICANT CHANGE UP (ref 3.8–10.5)
WBC # BLD: 16.66 K/UL — HIGH (ref 3.8–10.5)
WBC # BLD: 8.72 K/UL — SIGNIFICANT CHANGE UP (ref 3.8–10.5)
WBC # BLD: 8.81 K/UL — SIGNIFICANT CHANGE UP (ref 3.8–10.5)
WBC # BLD: 9.17 K/UL — SIGNIFICANT CHANGE UP (ref 3.8–10.5)
WBC # BLD: 9.32 K/UL — SIGNIFICANT CHANGE UP (ref 3.8–10.5)
WBC # FLD AUTO: 10.05 K/UL — SIGNIFICANT CHANGE UP (ref 3.8–10.5)
WBC # FLD AUTO: 16.66 K/UL — HIGH (ref 3.8–10.5)
WBC # FLD AUTO: 8.72 K/UL — SIGNIFICANT CHANGE UP (ref 3.8–10.5)
WBC # FLD AUTO: 8.81 K/UL — SIGNIFICANT CHANGE UP (ref 3.8–10.5)
WBC # FLD AUTO: 9.17 K/UL — SIGNIFICANT CHANGE UP (ref 3.8–10.5)
WBC # FLD AUTO: 9.32 K/UL — SIGNIFICANT CHANGE UP (ref 3.8–10.5)

## 2022-09-26 PROCEDURE — 99232 SBSQ HOSP IP/OBS MODERATE 35: CPT | Mod: GC

## 2022-09-26 PROCEDURE — 99291 CRITICAL CARE FIRST HOUR: CPT | Mod: GC

## 2022-09-26 PROCEDURE — 99233 SBSQ HOSP IP/OBS HIGH 50: CPT

## 2022-09-26 PROCEDURE — 99233 SBSQ HOSP IP/OBS HIGH 50: CPT | Mod: GC,25

## 2022-09-26 RX ORDER — CARVEDILOL PHOSPHATE 80 MG/1
12.5 CAPSULE, EXTENDED RELEASE ORAL EVERY 12 HOURS
Refills: 0 | Status: DISCONTINUED | OUTPATIENT
Start: 2022-09-26 | End: 2022-09-30

## 2022-09-26 RX ORDER — SODIUM CHLORIDE 9 MG/ML
1000 INJECTION, SOLUTION INTRAVENOUS
Refills: 0 | Status: DISCONTINUED | OUTPATIENT
Start: 2022-09-26 | End: 2022-09-26

## 2022-09-26 RX ORDER — LABETALOL HCL 100 MG
10 TABLET ORAL ONCE
Refills: 0 | Status: COMPLETED | OUTPATIENT
Start: 2022-09-26 | End: 2022-09-26

## 2022-09-26 RX ORDER — INSULIN LISPRO 100/ML
VIAL (ML) SUBCUTANEOUS EVERY 6 HOURS
Refills: 0 | Status: DISCONTINUED | OUTPATIENT
Start: 2022-09-26 | End: 2022-09-27

## 2022-09-26 RX ORDER — AMLODIPINE BESYLATE 2.5 MG/1
10 TABLET ORAL AT BEDTIME
Refills: 0 | Status: DISCONTINUED | OUTPATIENT
Start: 2022-09-26 | End: 2022-09-30

## 2022-09-26 RX ORDER — HYDRALAZINE HCL 50 MG
10 TABLET ORAL ONCE
Refills: 0 | Status: COMPLETED | OUTPATIENT
Start: 2022-09-26 | End: 2022-09-26

## 2022-09-26 RX ORDER — SOD SULF/SODIUM/NAHCO3/KCL/PEG
4000 SOLUTION, RECONSTITUTED, ORAL ORAL ONCE
Refills: 0 | Status: COMPLETED | OUTPATIENT
Start: 2022-09-26 | End: 2022-09-26

## 2022-09-26 RX ORDER — CHLORHEXIDINE GLUCONATE 213 G/1000ML
1 SOLUTION TOPICAL DAILY
Refills: 0 | Status: DISCONTINUED | OUTPATIENT
Start: 2022-09-26 | End: 2022-09-28

## 2022-09-26 RX ADMIN — Medication 2: at 23:05

## 2022-09-26 RX ADMIN — AMLODIPINE BESYLATE 10 MILLIGRAM(S): 2.5 TABLET ORAL at 22:31

## 2022-09-26 RX ADMIN — CHLORHEXIDINE GLUCONATE 1 APPLICATION(S): 213 SOLUTION TOPICAL at 11:15

## 2022-09-26 RX ADMIN — CARVEDILOL PHOSPHATE 12.5 MILLIGRAM(S): 80 CAPSULE, EXTENDED RELEASE ORAL at 06:18

## 2022-09-26 RX ADMIN — Medication 10 MILLIGRAM(S): at 19:20

## 2022-09-26 RX ADMIN — PANTOPRAZOLE SODIUM 40 MILLIGRAM(S): 20 TABLET, DELAYED RELEASE ORAL at 05:42

## 2022-09-26 RX ADMIN — PANTOPRAZOLE SODIUM 40 MILLIGRAM(S): 20 TABLET, DELAYED RELEASE ORAL at 17:08

## 2022-09-26 RX ADMIN — Medication 100 MILLIGRAM(S): at 11:14

## 2022-09-26 RX ADMIN — Medication 2: at 06:28

## 2022-09-26 RX ADMIN — Medication 4000 MILLILITER(S): at 11:14

## 2022-09-26 RX ADMIN — Medication 5 MILLIGRAM(S): at 17:08

## 2022-09-26 RX ADMIN — CARVEDILOL PHOSPHATE 12.5 MILLIGRAM(S): 80 CAPSULE, EXTENDED RELEASE ORAL at 17:08

## 2022-09-26 RX ADMIN — Medication 10 MILLIGRAM(S): at 16:15

## 2022-09-26 RX ADMIN — SODIUM CHLORIDE 50 MILLILITER(S): 9 INJECTION, SOLUTION INTRAVENOUS at 11:15

## 2022-09-26 RX ADMIN — ATORVASTATIN CALCIUM 20 MILLIGRAM(S): 80 TABLET, FILM COATED ORAL at 22:31

## 2022-09-26 RX ADMIN — GABAPENTIN 400 MILLIGRAM(S): 400 CAPSULE ORAL at 11:14

## 2022-09-26 NOTE — RAPID RESPONSE TEAM SUMMARY - NSSITUATIONBACKGROUNDRRT_GEN_ALL_CORE
78 y/o M with CAD s/p 1 stent ([facility] 12/2021), HLD, GERD, gout, DM, HTN, a-fib on plavix at home but now just on asa who presented to Ed with GI bleed likely 2/2 diverticulosis seen on CT imaging.     RRT called for large bright red bloody BM's. Patient had 8-10 large bright red bloody BM that I saw in the toilet and then comode. Patient started to feel unwell and dizzy. HD stable: 's/80's and HR 60-70. hbg at the time of his active bleeding episodes was 8.5. Patient remained mentating well and HD stable. BM with large clots and slowly decreased in amount.

## 2022-09-26 NOTE — CONSULT NOTE ADULT - SUBJECTIVE AND OBJECTIVE BOX
SICU Consultation Note  =====================================================  HPI: 77y Male  HPI:  78 yo M pmh CAD s/p 1stent ([facility] 2021), HLD, GERD, gout, DM, HTN, a-fib on plavix presents complaining of sudden onset BRBPR this morning. Patient has had 4 episodes total today, 2 at home and two while in the ED, states that every time the blood has filled the toilet bowl. Denies any prodromal chest pain, shortness of breath, palpitations or sensations of irregular heart rate,, abdominal pain, cramping, nausea, vomiting, diarrhea blood in urine or stool prior to episodes today. No recent NSAID use, history of GI bleeds, personal or family history of cancer. Last colonoscopy 3 years ago showed polyps that required clips.     Patient initially admitted to medicine. Surgery consulted for GI bleed, concern for diverticular bleed. GI also consulted, planning for colonoscopy when optimized by cardiology. IR consulted, most recent CT without active extravasation. Earlier in the night patient went to bathroom and became diaphoretic, light headed after multiple (reported 8-10) bloody bm. Transfusion of prbc started, RRT called to assist with assessment/transfusion. Received 3prbc, 1ffp and 1[place] as well.      PAST MEDICAL & SURGICAL HISTORY:  DM (diabetes mellitus)  Gout  HTN (hypertension)  BPH (benign prostatic hyperplasia)  H/O eye surgery      Home Meds: Home Medications:  allopurinol 100 mg oral tablet: 1 tab(s) orally once a day (24 Sep 2022 20:44)  amlodipine-benazepril 10 mg-40 mg oral capsule: 1 cap(s) orally once a day (24 Sep 2022 20:41)  atorvastatin 20 mg oral tablet: 1 tab(s) orally once a day (at bedtime) (2015 13:00)  Coreg 12.5 mg oral tablet: 1 tab(s) orally 2 times a day (24 Sep 2022 20:44)  gabapentin 400 mg oral capsule: 1 cap(s) orally once a day (24 Sep 2022 20:39)  gemfibrozil 600 mg oral tablet: 1 tab(s) orally 3 times a day (24 Sep 2022 20:40)  labetalol 200 mg oral tablet: 1 tab(s) orally 3 times a day (24 Sep 2022 20:39)  Ozempic 2 mg/1.5 mL (0.25 mg or 0.5 mg dose) subcutaneous solution:  (24 Sep 2022 20:41)  Plavix 75 mg oral tablet: 1 tab(s) orally once a day (24 Sep 2022 20:40)  Tresiba 100 units/mL subcutaneous solution: 80 unit(s) subcutaneous (24 Sep 2022 20:42)    Allergies: Allergies    No Known Allergies    Intolerances      Soc:   Advanced Directives: Presumed Full Code     ROS:    REVIEW OF SYSTEMS    [ ] A ten-point review of systems was otherwise negative except as noted.  [ ] Due to altered mental status/intubation, subjective information were not able to be obtained from the patient. History was obtained, to the extent possible, from review of the chart and collateral sources of information.      CURRENT MEDICATIONS:   --------------------------------------------------------------------------------------  Neurologic Medications  acetaminophen     Tablet .. 650 milliGRAM(s) Oral every 6 hours PRN Temp greater or equal to 38C (100.4F), Mild Pain (1 - 3)  gabapentin 400 milliGRAM(s) Oral daily  melatonin 3 milliGRAM(s) Oral at bedtime PRN Insomnia  ondansetron Injectable 4 milliGRAM(s) IV Push every 8 hours PRN Nausea and/or Vomiting    Respiratory Medications    Cardiovascular Medications    Gastrointestinal Medications  aluminum hydroxide/magnesium hydroxide/simethicone Suspension 30 milliLiter(s) Oral every 4 hours PRN Dyspepsia  dextrose 5%. 1000 milliLiter(s) IV Continuous <Continuous>  dextrose 5%. 1000 milliLiter(s) IV Continuous <Continuous>  pantoprazole  Injectable 40 milliGRAM(s) IV Push two times a day    Genitourinary Medications    Hematologic/Oncologic Medications  aspirin  chewable 81 milliGRAM(s) Oral daily    Antimicrobial/Immunologic Medications    Endocrine/Metabolic Medications  allopurinol 100 milliGRAM(s) Oral daily  atorvastatin 20 milliGRAM(s) Oral at bedtime  dextrose 50% Injectable 25 Gram(s) IV Push once  dextrose 50% Injectable 12.5 Gram(s) IV Push once  dextrose 50% Injectable 25 Gram(s) IV Push once  dextrose Oral Gel 15 Gram(s) Oral once PRN Blood Glucose LESS THAN 70 milliGRAM(s)/deciliter  glucagon  Injectable 1 milliGRAM(s) IntraMuscular once  insulin lispro (ADMELOG) corrective regimen sliding scale   SubCutaneous three times a day before meals  insulin lispro (ADMELOG) corrective regimen sliding scale   SubCutaneous at bedtime    Topical/Other Medications    --------------------------------------------------------------------------------------    VITAL SIGNS, INS/OUTS (last 24 hours):  --------------------------------------------------------------------------------------  ICU Vital Signs Last 24 Hrs  T(C): 36.2 (26 Sep 2022 04:48), Max: 36.8 (26 Sep 2022 00:05)  T(F): 97.1 (26 Sep 2022 04:48), Max: 98.2 (26 Sep 2022 00:05)  HR: 63 (26 Sep 2022 04:48) (62 - 72)  BP: 162/70 (26 Sep 2022 04:48) (123/81 - 177/68)  BP(mean): 95 (26 Sep 2022 04:48) (95 - 95)  ABP: --  ABP(mean): --  RR: 19 (26 Sep 2022 04:48) (17 - 19)  SpO2: 100% (26 Sep 2022 04:48) (100% - 100%)    O2 Parameters below as of 26 Sep 2022 04:48  Patient On (Oxygen Delivery Method): room air          I&O's Summary    25 Sep 2022 07:01  -  26 Sep 2022 04:49  --------------------------------------------------------  IN: 0 mL / OUT: 8200 mL / NET: -8200 mL      --------------------------------------------------------------------------------------    EXAM:  General/Neuro  Exam: Normal, NAD, alert, oriented x 3, no focal deficits.    Respiratory  Exam: Lungs clear to auscultation, Normal expansion/effort.      Cardiovascular  Exam: Regular rate and rhythm.  Cardiac Rhythm: Normal Sinus Rhythm    GI  Exam: Abdomen soft, Non-tender, Non-distended. No active bleeding per rectum or blood on underwear.    Tubes/Lines/Drains   [x] Peripheral IV  [] Central Venous Line     	[] R	[] L	[] IJ	[] Fem	[] SC        Type:	    Date Placed:   [] Arterial Line		[] R	[] L	[] Fem	[] Rad	[] Ax	Date Placed:   [] PICC:         	[] Midline		[] Mediport           [] Urinary Catheter		Date Placed:     Extremities  Exam: Extremities warm, pink, well-perfused. Patient able to transport self from transport bed to SICU bed.    Derm:  Exam: Good skin turgor, no skin breakdown.      LABS  --------------------------------------------------------------------------------------  Labs:  CAPILLARY BLOOD GLUCOSE      POCT Blood Glucose.: 208 mg/dL (26 Sep 2022 01:29)  POCT Blood Glucose.: 141 mg/dL (25 Sep 2022 23:47)  POCT Blood Glucose.: 119 mg/dL (25 Sep 2022 22:29)  POCT Blood Glucose.: 104 mg/dL (25 Sep 2022 17:43)  POCT Blood Glucose.: 183 mg/dL (25 Sep 2022 12:47)  POCT Blood Glucose.: 140 mg/dL (25 Sep 2022 08:41)                          9.5    16.66 )-----------( 233      ( 26 Sep 2022 01:41 )             28.6       Auto Neutrophil %: 63.2 % (22 @ 20:13)  Auto Immature Granulocyte %: 0.3 % (22 @ 20:13)  Auto Neutrophil %: 75.1 % (22 @ 11:14)  Auto Immature Granulocyte %: 0.4 % (22 @ 11:14)        134<L>  |  106  |  54<H>  ----------------------------<  110<H>  4.5   |  18<L>  |  3.90<H>      Calcium, Total Serum: 8.7 mg/dL (22 @ 23:05)      LFTs:             5.5  | 0.5  | 11       ------------------[60      ( 25 Sep 2022 04:20 )  3.1  | x    | <5          Lipase:x      Amylase:x         Blood Gas Venous - Lactate: 2.3 mmol/L (22 @ 01:41)  Blood Gas Venous - Lactate: 1.8 mmol/L (22 @ 14:34)  Blood Gas Venous - Lactate: 2.5 mmol/L (22 @ 10:11)      Coags:     12.9   ----< 1.11    ( 25 Sep 2022 23:05 )     28.5            Serum Pro-Brain Natriuretic Peptide: 853 pg/mL (22 @ 10:11)      Urinalysis Basic - ( 24 Sep 2022 23:59 )    Color: Light Yellow / Appearance: Clear / S.037 / pH: x  Gluc: x / Ketone: Negative  / Bili: Negative / Urobili: <2 mg/dL   Blood: x / Protein: 300 mg/dL / Nitrite: Negative   Leuk Esterase: Negative / RBC: 3 /HPF / WBC 2 /HPF   Sq Epi: x / Non Sq Epi: 1 /HPF / Bacteria: Negative          --------------------------------------------------------------------------------------    OTHER LABS    IMAGING RESULTS  < from: CT Abdomen and Pelvis w/wo IV Cont (22 @ 11:25) >  FINDINGS:  LOWER CHEST: Aortic valve and coronary artery calcification.    LIVER: Cirrhosis.  BILE DUCTS: Normal caliber.  GALLBLADDER: Within normal limits.  SPLEEN: Within normal limits.  PANCREAS: Within normal limits.  ADRENALS: 2.5 cm dominant right adrenal adenoma as well as other adenomas   bilaterally.  KIDNEYS/URETERS: Symmetric enhancement. No hydronephrosis. Bilateral   hypodensities too small characterize.    BLADDER: Underdistended.  REPRODUCTIVE ORGANS: Prostate is enlarged.    BOWEL: Diffuse mucosal hyperemia of the large bowel and rectum. No   intraluminal extravasation of contrast to suggest a GI bleed. No bowel   obstruction. Appendix is normal. Colonic diverticulosis without acute   diverticulitis.  PERITONEUM: No ascites.  VESSELS: Atherosclerotic changes.  RETROPERITONEUM/LYMPH NODES: No lymphadenopathy.  ABDOMINAL WALL: Small bilateral fat-containing inguinal hernias.  BONES: Degenerative changes. Mild retrolisthesis L3 on L4.    IMPRESSION:    No CT evidence of active GI bleed.    < end of copied text >

## 2022-09-26 NOTE — PROGRESS NOTE ADULT - ASSESSMENT
77y Male, SICU consulted for GI bleed. Patient s/p 3u PRBCs, 1u plts, 1u ffp. Medical team concerned that patient unable to be sufficiently transfused on floor. Hemoglobin is not stable. Will not list this patient for floor. Will undergo colonoscopy tomorrow.      PLAN:   Neurologic:   - Patient states is feeling well w/o pain.    Respiratory:  - Continue to monitor     Cardiovascular:   - Patient currently normotensive to hypertensive.  - Continue home medications    Gastrointestinal/Nutrition:   - Continue to monitor for active bleeding  - Will undergo colonoscopy tomorrow    Hematologic:   - S/p 3u PRBcs, 1u plt, 1u ffp  - f/u TEG, as patient on aspirin/plavix at home  - transfuse as needed    Endocrine:   - ISS  - Will repeat BMP and monitor K+, if high will shift K+ with insulin

## 2022-09-26 NOTE — PROVIDER CONTACT NOTE (OTHER) - ACTION/TREATMENT ORDERED:
MAR notified. RN give PRBC's over 2 hours. RN continue to monitor for more bloody stool/ expulsions.
MAR notified. Rapid response called. Team came to bedside
MAR notified. Rapid response called. Team came to bedside. Rapid response called. Patient received another unit of blood and was also ordered platelets and plasma. MICU consulted

## 2022-09-26 NOTE — DIETITIAN INITIAL EVALUATION ADULT - NSFNSGIIOFT_GEN_A_CORE
09-25-22 @ 07:01  -  09-26-22 @ 07:00  --------------------------------------------------------  OUT:    Stool (mL): 4100 mL  Total OUT: 4100 mL    Total NET: -4100 mL

## 2022-09-26 NOTE — DIETITIAN INITIAL EVALUATION ADULT - PERTINENT MEDS FT
MEDICATIONS  (STANDING):  allopurinol 100 milliGRAM(s) Oral daily  amLODIPine   Tablet 10 milliGRAM(s) Oral at bedtime  atorvastatin 20 milliGRAM(s) Oral at bedtime  bisacodyl 5 milliGRAM(s) Oral every 12 hours  carvedilol 12.5 milliGRAM(s) Oral every 12 hours  chlorhexidine 4% Liquid 1 Application(s) Topical daily  dextrose 50% Injectable 25 Gram(s) IV Push once  dextrose 50% Injectable 25 Gram(s) IV Push once  gabapentin 400 milliGRAM(s) Oral daily  insulin lispro (ADMELOG) corrective regimen sliding scale   SubCutaneous every 6 hours  lactated ringers. 1000 milliLiter(s) (50 mL/Hr) IV Continuous <Continuous>  pantoprazole  Injectable 40 milliGRAM(s) IV Push two times a day    MEDICATIONS  (PRN):  acetaminophen     Tablet .. 650 milliGRAM(s) Oral every 6 hours PRN Temp greater or equal to 38C (100.4F), Mild Pain (1 - 3)  aluminum hydroxide/magnesium hydroxide/simethicone Suspension 30 milliLiter(s) Oral every 4 hours PRN Dyspepsia  melatonin 3 milliGRAM(s) Oral at bedtime PRN Insomnia  ondansetron Injectable 4 milliGRAM(s) IV Push every 8 hours PRN Nausea and/or Vomiting

## 2022-09-26 NOTE — PROGRESS NOTE ADULT - ASSESSMENT
76 yo M pmh CAD s/p 1 stent in 2021 (on plavix- last dose 9/22), HLD, GERD, gout, DM, HTN, Afib (?unclear AC) presents complaining of sudden onset BRBPR x1 day. Patient has had 4 episodes total in last 24-48 h, 2 at home and two while in the ED. Has never had rectal bleeding like this in the past. Reports 20 lb unintentional weight loss over the last few months due to poor appetite. Last colonoscopy 3 years ago with polyps s/p endoclips. Has never had an EGD before. In the ED, VSS, Hgb 10 -> 8 (BL 10-12), K 6.1, elevated trop. CT A/P showing diffuse mucosal hyperemia of the large bowel and rectum, no intraluminal extravasation of contrast to suggest a GI bleed, no bowel obstruction, colonic diverticulosis without acute diverticulitis. GI consulted for c/f LGIB.    #LGIB  Suspect most likely 2/2 diverticular bleed. but other d/dx angioectasias/AVMs, hemorrhoids. RRT called overnight 9/25-9/26 for BRBPR a/w diaphoresis and dizziness but o/w HD stable. s/p total of 6 u pRBC (last transfusion 9/25), 1 u FFP, 1 u plt this admission with Hgb 8-9.    #CAD s/p 1 stent in 2021 (on plavix- last dose 9/22)  #Afib (?unknown if on AC)  #hyperK- K 6.1     Recommendations:  -trend vitals, CBC, and monitor for clinical signs of bleeding  -maintain active type and screen  -transfusion goal to maintain hemoglobin >/= 8.0  -rule out other causes for anemia [order iron studies, ferritin, vitamin B12, folate]  -avoid NSAIDs  -PPI IV BID acceptable for now though low suspicion for UGIB at this time  -hold plavix if ok with cardiology, ok to c/w ASA (no GI contraindication to ASA)  -correct hyperkalemia; K <5  -GI fellow to order prep  -clear liquid diet today  -please order labs (CBC, CMP, INR) for 4 AM so that results will be available early tomorrow morning; replete lytes and transfuse as needed  -NPO after midnight tonight      **THIS NOTE IS NOT FINALIZED UNTIL SIGNED BY THE ATTENDING**    Madalyn Aguilar MD  GI Fellow, PGY-5  Available via [employer] Teams    NON-URGENT CONSULTS:  Please email [email or web address]  AT NIGHT AND ON WEEKENDS:  Contact on-call GI fellow via answering service ([phone]) from 5pm-8am and on weekends/holidays  MONDAY-FRIDAY 8AM-5PM:  Pager# [phone] ([facility]) or [phone] ([facility])  GI Phone# [phone] ([facility])

## 2022-09-26 NOTE — PROGRESS NOTE ADULT - SUBJECTIVE AND OBJECTIVE BOX
Surgery [facility] B Team Daily Progress Note   MARTINA TAYLOR | [id number]  --------------------------------------------------------------------------------------------------------------------  SUBJECTIVE / 24H EVENTS  Patient seen and examined on morning rounds. No acute events overnight.  --------------------------------------------------------------------------------------------------------------------  OBJECTIVE:    VITAL SIGNS:  T(C): 36.6 (22 @ 08:00), Max: 36.8 (22 @ 00:05)  HR: 67 (22 @ 09:00) (58 - 72)  BP: 139/75 (22 @ 09:00) (123/81 - 177/68)  RR: 18 (22 @ 09:00) (12 - 22)  SpO2: 100% (22 @ 09:00) (100% - 100%)  Daily     Daily Weight in k.8 (26 Sep 2022 04:45)  POCT Blood Glucose.: 178 mg/dL (22 @ 06:22)  POCT Blood Glucose.: 208 mg/dL (22 @ 01:29)  POCT Blood Glucose.: 141 mg/dL (22 @ 23:47)      PHYSICAL EXAM:  Gen: NAD  LS: Respirations unlabored.   Card: RRR.   GI: Soft. Nontender. Nondistended.   Ext: Warm, well perfused      22 @ 07:01  -  22 @ 07:00  --------------------------------------------------------  IN:    Lactated Ringers: 75 mL  Total IN: 75 mL    OUT:    Blood Loss (mL): 4100 mL    Stool (mL): 4100 mL    Voided (mL): 0 mL  Total OUT: 8200 mL    Total NET: -8125 mL      22 @ 07:01  -  22 @ 09:30  --------------------------------------------------------  IN:    Lactated Ringers: 225 mL  Total IN: 225 mL    OUT:    Oral Fluid: 0 mL    Voided (mL): 250 mL  Total OUT: 250 mL    Total NET: -25 mL          LAB VALUES:      134<L>  |  106  |  67<H>  ----------------------------<  165<H>  6.1<H>   |  15<L>  |  4.38<H>    Ca    8.3<L>      26 Sep 2022 06:00  Phos  4.0       Mg     1.70         TPro  5.5<L>  /  Alb  3.1<L>  /  TBili  0.5  /  DBili  x   /  AST  11  /  ALT  <5<L>  /  AlkPhos  60                                 9.3    8.81  )-----------( 197      ( 26 Sep 2022 06:00 )             27.0     LIVER FUNCTIONS - ( 25 Sep 2022 04:20 )  Alb: 3.1 g/dL / Pro: 5.5 g/dL / ALK PHOS: 60 U/L / ALT: <5 U/L / AST: 11 U/L / GGT: x           PT/INR - ( 25 Sep 2022 23:05 )   PT: 12.9 sec;   INR: 1.11 ratio         PTT - ( 25 Sep 2022 23:05 )  PTT:28.5 sec        Urinalysis Basic - ( 24 Sep 2022 23:59 )    Color: Light Yellow / Appearance: Clear / S.037 / pH: x  Gluc: x / Ketone: Negative  / Bili: Negative / Urobili: <2 mg/dL   Blood: x / Protein: 300 mg/dL / Nitrite: Negative   Leuk Esterase: Negative / RBC: 3 /HPF / WBC 2 /HPF   Sq Epi: x / Non Sq Epi: 1 /HPF / Bacteria: Negative        MICROBIOLOGY:    No new microbiology data for review.     RADIOLOGY:    No new radiographic images for review.    MEDICATIONS  (STANDING):  allopurinol 100 milliGRAM(s) Oral daily  amLODIPine   Tablet 10 milliGRAM(s) Oral at bedtime  atorvastatin 20 milliGRAM(s) Oral at bedtime  carvedilol 12.5 milliGRAM(s) Oral every 12 hours  chlorhexidine 4% Liquid 1 Application(s) Topical daily  dextrose 50% Injectable 25 Gram(s) IV Push once  dextrose 50% Injectable 25 Gram(s) IV Push once  gabapentin 400 milliGRAM(s) Oral daily  insulin lispro (ADMELOG) corrective regimen sliding scale   SubCutaneous every 6 hours  lactated ringers. 1000 milliLiter(s) (75 mL/Hr) IV Continuous <Continuous>  pantoprazole  Injectable 40 milliGRAM(s) IV Push two times a day    MEDICATIONS  (PRN):  acetaminophen     Tablet .. 650 milliGRAM(s) Oral every 6 hours PRN Temp greater or equal to 38C (100.4F), Mild Pain (1 - 3)  aluminum hydroxide/magnesium hydroxide/simethicone Suspension 30 milliLiter(s) Oral every 4 hours PRN Dyspepsia  melatonin 3 milliGRAM(s) Oral at bedtime PRN Insomnia  ondansetron Injectable 4 milliGRAM(s) IV Push every 8 hours PRN Nausea and/or Vomiting

## 2022-09-26 NOTE — CHART NOTE - NSCHARTNOTEFT_GEN_A_CORE
Tentative plan for colon +/- EGD tomorrow. Cleared by cardiology.    Recs:  -GI fellow to order prep  -clear liquid diet today  -please order labs (CBC, CMP, INR) for 4 AM so that results will be available early tomorrow morning; replete lytes and transfuse as needed  -NPO after midnight tonight      Madalyn Aguilar MD  GI Fellow, PGY-5  Available via Microsoft Teams    NON-URGENT CONSULTS:  Please email [email or web address]  AT NIGHT AND ON WEEKENDS:  Contact on-call GI fellow via answering service ([phone]) from 5pm-8am and on weekends/holidays  MONDAY-FRIDAY 8AM-5PM:  Pager# [phone] ([facility]) or [phone] ([facility])  GI Phone# [phone] ([facility]) Tentative plan for colon +/- EGD tomorrow. Cleared by cardiology.    Recs:  -correct hyperkalemia; K <5  -GI fellow to order prep  -clear liquid diet today  -please order labs (CBC, CMP, INR) for 4 AM so that results will be available early tomorrow morning; replete lytes and transfuse as needed  -NPO after midnight nicole Aguilar MD  GI Fellow, PGY-5  Available via Microsoft Teams    NON-URGENT CONSULTS:  Please email [email or web address]  AT NIGHT AND ON WEEKENDS:  Contact on-call GI fellow via answering service ([phone]) from 5pm-8am and on weekends/holidays  MONDAY-FRIDAY 8AM-5PM:  Pager# [phone] ([facility]) or [phone] ([facility])  GI Phone# [phone] ([facility])

## 2022-09-26 NOTE — PROVIDER CONTACT NOTE (OTHER) - SITUATION
Patient is bleeding out of rectum, passing clots and large amounts of bright red blood
Lateral:/Anterior:/coarse
Patient is bleeding out of rectum, passing clots and large amounts of bright red blood
Patient is bleeding out of rectum, passing clots and large amounts of bright red blood

## 2022-09-26 NOTE — DIETITIAN INITIAL EVALUATION ADULT - REASON FOR ADMISSION
Gastrointestinal hemorrhage  77y Male, SICU consulted for GI bleed. Patient s/p 3u PRBCs, 1u plts, 1u ffp. Medical team concerned that patient unable to be sufficiently transfused on floor.   Patient initially admitted to medicine. Surgery consulted for GI bleed, concern for diverticular bleed. GI also consulted, planning for colonoscopy when optimized by cardiology. IR consulted, most recent CT without active extravasation. Earlier in the night patient went to bathroom and became diaphoretic, light headed after multiple (reported 8-10) bloody bm. Transfusion of prbc started, RRT called to assist with assessment/transfusion. Received 3prbc, 1ffp and 1plateles as well.

## 2022-09-26 NOTE — PROGRESS NOTE ADULT - SUBJECTIVE AND OBJECTIVE BOX
Chief Complaint:  Patient is a 77y old  Male who presents with a chief complaint of BRBPR (26 Sep 2022 09:29)      Interval Events: RRT called overnight - for BRBPR a/w diaphoresis and dizziness but o/w HD stable. s/p 3 u pRBC, 1 u FFP, 1 u plt with Hgb 8-9. Reports feeling well this AM, no further episodes of bleeding since overnight. Denies any N/V/D/C, abd pain. Cleared by cardiology.      Hospital Medications:  acetaminophen     Tablet .. 650 milliGRAM(s) Oral every 6 hours PRN  allopurinol 100 milliGRAM(s) Oral daily  aluminum hydroxide/magnesium hydroxide/simethicone Suspension 30 milliLiter(s) Oral every 4 hours PRN  amLODIPine   Tablet 10 milliGRAM(s) Oral at bedtime  atorvastatin 20 milliGRAM(s) Oral at bedtime  bisacodyl 5 milliGRAM(s) Oral every 12 hours  carvedilol 12.5 milliGRAM(s) Oral every 12 hours  chlorhexidine 4% Liquid 1 Application(s) Topical daily  dextrose 50% Injectable 25 Gram(s) IV Push once  dextrose 50% Injectable 25 Gram(s) IV Push once  gabapentin 400 milliGRAM(s) Oral daily  insulin lispro (ADMELOG) corrective regimen sliding scale   SubCutaneous every 6 hours  lactated ringers. 1000 milliLiter(s) IV Continuous <Continuous>  melatonin 3 milliGRAM(s) Oral at bedtime PRN  ondansetron Injectable 4 milliGRAM(s) IV Push every 8 hours PRN  pantoprazole  Injectable 40 milliGRAM(s) IV Push two times a day  polyethylene glycol/electrolyte Solution. 4000 milliLiter(s) Oral once      PMHX/PSHX:  DM (diabetes mellitus)    Gout    HTN (hypertension)    BPH (benign prostatic hyperplasia)    H/O eye surgery            ROS: 14 point ROS negative unless otherwise stated in subjective      PHYSICAL EXAM:     GENERAL:  Well developed, no distress  HEENT:  NC/AT,  conjunctivae clear, sclera anicteric  CHEST:  Full & symmetric excursion, no increased effort w/ respirations  HEART:  Regular rhythm & rate  ABDOMEN:  Soft, non-tender, non-distended  EXTREMITIES:  no LE  edema  SKIN:  No rash/erythema/ecchymoses/petechiae/wounds/jaundice  NEURO:  Alert, orientedx3    Vital Signs:  Vital Signs Last 24 Hrs  T(C): 36.6 (26 Sep 2022 08:00), Max: 36.8 (26 Sep 2022 00:05)  T(F): 97.9 (26 Sep 2022 08:00), Max: 98.2 (26 Sep 2022 00:05)  HR: 62 (26 Sep 2022 10:00) (58 - 72)  BP: 143/73 (26 Sep 2022 10:00) (123/81 - 177/68)  BP(mean): 92 (26 Sep 2022 10:00) (90 - 95)  RR: 17 (26 Sep 2022 10:00) (12 - 22)  SpO2: 100% (26 Sep 2022 10:00) (100% - 100%)    Parameters below as of 26 Sep 2022 09:00  Patient On (Oxygen Delivery Method): room air      Daily     Daily Weight in k.8 (26 Sep 2022 04:45)    LABS:                        9.3    8.81  )-----------( 197      ( 26 Sep 2022 06:00 )             27.0     09-    134<L>  |  106  |  67<H>  ----------------------------<  165<H>  6.1<H>   |  15<L>  |  4.38<H>    Ca    8.3<L>      26 Sep 2022 06:00  Phos  4.0     09-  Mg     1.70     09-26    TPro  5.5<L>  /  Alb  3.1<L>  /  TBili  0.5  /  DBili  x   /  AST  11  /  ALT  <5<L>  /  AlkPhos  60  09-25    LIVER FUNCTIONS - ( 25 Sep 2022 04:20 )  Alb: 3.1 g/dL / Pro: 5.5 g/dL / ALK PHOS: 60 U/L / ALT: <5 U/L / AST: 11 U/L / GGT: x           PT/INR - ( 25 Sep 2022 23:05 )   PT: 12.9 sec;   INR: 1.11 ratio         PTT - ( 25 Sep 2022 23:05 )  PTT:28.5 sec  Urinalysis Basic - ( 24 Sep 2022 23:59 )    Color: Light Yellow / Appearance: Clear / S.037 / pH: x  Gluc: x / Ketone: Negative  / Bili: Negative / Urobili: <2 mg/dL   Blood: x / Protein: 300 mg/dL / Nitrite: Negative   Leuk Esterase: Negative / RBC: 3 /HPF / WBC 2 /HPF   Sq Epi: x / Non Sq Epi: 1 /HPF / Bacteria: Negative          Imaging: No new abdominal imaging

## 2022-09-26 NOTE — RAPID RESPONSE TEAM SUMMARY - NSSITUATIONBACKGROUNDRRT_GEN_ALL_CORE
Patient still with active GI bleed. He continues to be HDS but is diaphoretic with dizziness. Patient states that he feels unwell. Denies n/v but continues to have bright red blood per rectum. Hbg on repeat was up from 8.5 to 9.5 after getting 2 units of prbc.     IR called as well as general surgery. Patient will be taken to SICU for further management  Patient still with active GI bleed. He continues to be HDS but is diaphoretic with dizziness. Patient states that he feels unwell. Denies n/v but continues to have bright red blood per rectum. Hbg on repeat was up from 8.5 to 9.5 after getting 2 units of prbc.     IR, MICU, SICU and general surgery called. Patient will be taken to SICU for further management.

## 2022-09-26 NOTE — CHART NOTE - NSCHARTNOTEFT_GEN_A_CORE
IR Follow-Up     78 yo male with hx of CAD s/p stent in 2021 on plavix (last dose 9/22), ASA, a-fib with unclear anticoagulation status, p/w BRBPR on 9/24. CTA abdomen/pelvis showed no active extravasation. Patient had 2 RRTs for large BRBPR, most recently overnight on 9/25. Hgb was 10.3 on admission and is currently 8.5. Patient is receiving a 3rd unit of PRBC since admission. Vitals are stable, last /70 as per team (documented 175/70 with HR of 63). IR consulted for evaluation.   — Recommend continued resuscitation with transfusions as needed  — continue to hold anticoagulation  — CTA reviewed ; no active extravasation; diverticulosis. Patient has history of polyps s/p GI intervention as per chart.   — recommend GI scope (colonoscopy +/- upper endoscopy) once cleared by cardiology   — can repeat imagine if bleeding continues, but given elevated Cr (likely acute on chronic kidney injury as per team) may consider holding since patient is stable until patient can have GI scope.   — IR on board; & will re-evaluate in a.m., please let us know more urgently if patient is decompensating    --  Jesus Dawson DO/DELMY  Interventional Radiology Resident (PGY-4)  Available on [employer] TEAMS    For EMERGENT inquiries/questions:  IR Pager ([facility]): [phone]  IR Pager ([facility]): [phone] ; [phone]    For non-emergent consults/questions:   Please place a sunrise order "Consult- Interventional Radiology" with an appropriate callback number    For questions about scheduling during appropriate work hours, call IR :  [facility]: [phone]  [facility]: [phone]    For outpatient IR booking:  [facility]: [phone]  [facility]: [phone]

## 2022-09-26 NOTE — DIETITIAN INITIAL EVALUATION ADULT - NS FNS DIET ORDER
Diet, Consistent Carbohydrate Clear Liquid (09-26-22 @ 10:41)  Diet, NPO after Midnight:      NPO Start Date: 26-Sep-2022,   NPO Start Time: 23:59 (09-26-22 @ 10:39)

## 2022-09-26 NOTE — CONSULT NOTE ADULT - ASSESSMENT
76 yo M pmh CAD s/p 1stent ([facility] 12/2021), HLD, GERD, gout, DM, HTN, a-fib on plavix presents complaining of sudden onset BRBPR.Earlier in the night patient went to bathroom and became diaphoretic, light headed after multiple (reported 8-10) bloody bm. Transfusion of prbc started, RRT called to assist with assessment/transfusion. Received 3prbc, 1ffp and 1plateles as well.  Transferred to SICU for hemodynamic monitopring and blood resusitation.    Problem/Plan - 1:  ·  Problem: Lower GI bleed.   ·  Plan: Patient with 4 episodes of BRBPR x hours and resultant drop in Hb while in ED, hemodynamically stable (BPs elevated, HR 60s-70s) s/p 2U PRBCs in ED.  Recurrent MN bleeding  transferred to SICU  PRBC Tx  Keep Hgb>8    Problem/Plan - 2:  ·  Problem: CAD (coronary artery disease).   ·  Plan: CAD s/p 1 stent on plavix. Will hold plavix   Chronic stable ischemic heart disease  In view of active GIB agree to hold DAPT  No cardiac contraindications for Colonoscopy-he is optimized from a cardiac point of view-No active ischemia evidence of decompensated heart failure,clinical evidence for obstructive valvular heart disease or uncontrollable arrhythmias.

## 2022-09-26 NOTE — CONSULT NOTE ADULT - SUBJECTIVE AND OBJECTIVE BOX
GENERAL SURGERY CONSULT NOTE  --------------------------------------------------------------------------------------------    HPI:   Patient is a 77y old  Male who presents with a chief complaint of BRBPR (25 Sep 2022 11:41)    HPI:  78 yo M pmh CAD s/p 1stent ([facility] 2021), HLD, GERD, gout, DM, HTN, a-fib on plavix (last dose , also on aspirin) presents complaining of sudden onset BRBPR this morning. Patient has had 4 episodes total today, 2 at home and two while in the ED, states that every time the blood has filled the toilet bowl. Denies any prodromal chest pain, shortness of breath, palpitations or sensations of irregular heart rate,, abdominal pain, cramping, nausea, vomiting, diarrhea blood in urine or stool prior to episodes today. No recent NSAID use, history of GI bleeds, personal or family history of cancer. Last colonoscopy 3 years ago showed polyps that required clips.  (24 Sep 2022 17:53)    Surgery consulted for GI bleed, concern for diverticular bleed. GI also consulted, planning for colonoscopy when optimized by cardiology. IR consulted, most recent CT without active extravasation. Earlier in the night patient went to bathroom and became diaphoretic, light headed after multiple (reported 8-10) bloody bm. Transfusion of prbc started, RRT called to assist with assessment/transfusion. Ultimately received 3prbc, planning for 1ffp and 1plateley as well. On interview patient had relief of symptoms when laying in bed.    ROS: 10-system review is otherwise negative except HPI above.      PAST MEDICAL & SURGICAL HISTORY:  DM (diabetes mellitus)      Gout      HTN (hypertension)      BPH (benign prostatic hyperplasia)      H/O eye surgery        FAMILY HISTORY:  Family history of diabetes mellitus (Mother)    Family history of hypertension (Mother)    [] Family history not pertinent as reviewed with the patient and family    SOCIAL HISTORY:    Denies tobacco use    ALLERGIES: No Known Allergies      HOME MEDICATIONS:   allopurinol 100 mg oral tablet: 1 tab(s) orally once a day (24 Sep 2022 20:44)  amlodipine-benazepril 10 mg-40 mg oral capsule: 1 cap(s) orally once a day (24 Sep 2022 20:41)  atorvastatin 20 mg oral tablet: 1 tab(s) orally once a day (at bedtime) (2015 13:00)  Coreg 12.5 mg oral tablet: 1 tab(s) orally 2 times a day (24 Sep 2022 20:44)  gabapentin 400 mg oral capsule: 1 cap(s) orally once a day (24 Sep 2022 20:39)  gemfibrozil 600 mg oral tablet: 1 tab(s) orally 3 times a day (24 Sep 2022 20:40)  labetalol 200 mg oral tablet: 1 tab(s) orally 3 times a day (24 Sep 2022 20:39)  Ozempic 2 mg/1.5 mL (0.25 mg or 0.5 mg dose) subcutaneous solution:  (24 Sep 2022 20:41)  Plavix 75 mg oral tablet: 1 tab(s) orally once a day (24 Sep 2022 20:40)  Tresiba 100 units/mL subcutaneous solution: 80 unit(s) subcutaneous (24 Sep 2022 20:42)      CURRENT MEDICATIONS  MEDICATIONS (STANDING): allopurinol 100 milliGRAM(s) Oral daily  aspirin  chewable 81 milliGRAM(s) Oral daily  atorvastatin 20 milliGRAM(s) Oral at bedtime  dextrose 5%. 1000 milliLiter(s) IV Continuous <Continuous>  dextrose 5%. 1000 milliLiter(s) IV Continuous <Continuous>  dextrose 50% Injectable 25 Gram(s) IV Push once  dextrose 50% Injectable 12.5 Gram(s) IV Push once  dextrose 50% Injectable 25 Gram(s) IV Push once  gabapentin 400 milliGRAM(s) Oral daily  glucagon  Injectable 1 milliGRAM(s) IntraMuscular once  insulin lispro (ADMELOG) corrective regimen sliding scale   SubCutaneous three times a day before meals  insulin lispro (ADMELOG) corrective regimen sliding scale   SubCutaneous at bedtime  pantoprazole  Injectable 40 milliGRAM(s) IV Push two times a day    MEDICATIONS (PRN):acetaminophen     Tablet .. 650 milliGRAM(s) Oral every 6 hours PRN Temp greater or equal to 38C (100.4F), Mild Pain (1 - 3)  aluminum hydroxide/magnesium hydroxide/simethicone Suspension 30 milliLiter(s) Oral every 4 hours PRN Dyspepsia  dextrose Oral Gel 15 Gram(s) Oral once PRN Blood Glucose LESS THAN 70 milliGRAM(s)/deciliter  melatonin 3 milliGRAM(s) Oral at bedtime PRN Insomnia  ondansetron Injectable 4 milliGRAM(s) IV Push every 8 hours PRN Nausea and/or Vomiting    --------------------------------------------------------------------------------------------    Vitals:   T(C): 36.7 (22 @ 18:42), Max: 36.7 (22 @ 18:42)  HR: 63 (22 @ 18:42) (63 - 69)  BP: 175/70 (22 @ 18:42) (142/64 - 175/70)  RR: 18 (22 @ 18:42) (17 - 18)  SpO2: 100% (22 @ 18:42) (100% - 100%)  CAPILLARY BLOOD GLUCOSE      POCT Blood Glucose.: 208 mg/dL (26 Sep 2022 01:29)  POCT Blood Glucose.: 141 mg/dL (25 Sep 2022 23:47)  POCT Blood Glucose.: 119 mg/dL (25 Sep 2022 22:29)  POCT Blood Glucose.: 104 mg/dL (25 Sep 2022 17:43)  POCT Blood Glucose.: 183 mg/dL (25 Sep 2022 12:47)  POCT Blood Glucose.: 140 mg/dL (25 Sep 2022 08:41)       @ 07:01  -   @ 02:51  --------------------------------------------------------  IN:  Total IN: 0 mL    OUT:    Blood Loss (mL): 4100 mL    Stool (mL): 4100 mL  Total OUT: 8200 mL    Total NET: -8200 mL        Height (cm): 175.3 ( @ 09:26)    PHYSICAL EXAM:   General: NAD, Lying in bed comfortably  Neuro: A+Ox4  HEENT: NC/AT, EOMI  Cardio: Regular rate  Resp: Good effort  GI/Abd: Soft, NT/ND, no rebound/guarding, no masses palpated  Rectal: No external hemorrhoids seen, no active bleeding at time, blood on bert from recent bleed  Vascular: All 4 extremities warm.  Skin: Intact, no breakdown  Lymphatic/Nodes: No palpable lymphadenopathy  Musculoskeletal: All 4 extremities moving spontaneously, no limitations  --------------------------------------------------------------------------------------------    LABS  CBC ( @ 01:41)                              9.5<L>                         16.66<H>  )----------------(  233        --    % Neutrophils, --    % Lymphocytes, ANC: --                                  28.6<L>  CBC ( @ 23:05)                              8.5<L>                         9.03    )----------------(  236        --    % Neutrophils, --    % Lymphocytes, ANC: --                                  25.6<L>    BMP ( @ 23:05)             134<L>  |  106     |  54<H> 		Ca++ --      Ca 8.7                ---------------------------------( 110<H>		Mg 1.60               4.5     |  18<L>   |  3.90<H>			Ph 3.5     BMP ( @ 04:20)             141     |  110<H>  |  57<H> 		Ca++ --      Ca 8.5                ---------------------------------( 126<H>		Mg --                 4.8     |  19<L>   |  3.88<H>			Ph --        LFTs ( @ 04:20)      TPro 5.5<L> / Alb 3.1<L> / TBili 0.5 / DBili -- / AST 11 / ALT <5<L> / AlkPhos 60  LFTs ( @ 23:30)      TPro 5.2<L> / Alb 3.0<L> / TBili 0.3 / DBili -- / AST 10 / ALT <5<L> / AlkPhos 59    Coags ( @ 23:05)  aPTT 28.5 / INR 1.11 / PT 12.9  Coags ( @ 04:20)  aPTT -- / INR 1.11 / PT 12.9        VBG ( @ 01:41)     7.25<L> / 36<L> / 47 / 16<L> / -10.6<L> / 79.7%     Lactate: 2.3<H>    --------------------------------------------------------------------------------------------    MICROBIOLOGY  Urinalysis ( @ 23:59):     Color: Light Yellow / Appearance: Clear / S.037 / pH: 6.0 / Gluc: 100 mg/dL<!> / Ketones: Negative / Bili: Negative / Urobili: <2 mg/dL / Protein :300 mg/dL<!> / Nitrites: Negative / Leuk.Est: Negative / RBC: 3 / WBC: 2 / Sq Epi:  / Non Sq Epi: 1 / Bacteria Negative         --------------------------------------------------------------------------------------------    < from: CT Abdomen and Pelvis w/wo IV Cont (22 @ 11:25) >  FINDINGS:  LOWER CHEST: Aortic valve and coronary artery calcification.    LIVER: Cirrhosis.  BILE DUCTS: Normal caliber.  GALLBLADDER: Within normal limits.  SPLEEN: Within normal limits.  PANCREAS: Within normal limits.  ADRENALS: 2.5 cm dominant right adrenal adenoma as well as other adenomas   bilaterally.  KIDNEYS/URETERS: Symmetric enhancement. No hydronephrosis. Bilateral   hypodensities too small characterize.    BLADDER: Underdistended.  REPRODUCTIVE ORGANS: Prostate is enlarged.    BOWEL: Diffuse mucosal hyperemia of the large bowel and rectum. No   intraluminal extravasation of contrast to suggest a GI bleed. No bowel   obstruction. Appendix is normal. Colonic diverticulosis without acute   diverticulitis.  PERITONEUM: No ascites.  VESSELS: Atherosclerotic changes.  RETROPERITONEUM/LYMPH NODES: No lymphadenopathy.  ABDOMINAL WALL: Small bilateral fat-containing inguinal hernias.  BONES: Degenerative changes. Mild retrolisthesis L3 on L4.    IMPRESSION:    No CT evidence of active GI bleed.    < end of copied text >      --------------------------------------------------------------------------------------------

## 2022-09-26 NOTE — PROGRESS NOTE ADULT - SUBJECTIVE AND OBJECTIVE BOX
HISTORY  76 yo M pmh CAD s/p 1stent ([facility] 12/2021), HLD, GERD, gout, DM, HTN, a-fib on plavix presents complaining of sudden onset BRBPR this morning. Patient has had 4 episodes total today, 2 at home and two while in the ED, states that every time the blood has filled the toilet bowl. Denies any prodromal chest pain, shortness of breath, palpitations or sensations of irregular heart rate,, abdominal pain, cramping, nausea, vomiting, diarrhea blood in urine or stool prior to episodes today. No recent NSAID use, history of GI bleeds, personal or family history of cancer. Last colonoscopy 3 years ago showed polyps that required clips.     Patient initially admitted to medicine. Surgery consulted for GI bleed, concern for diverticular bleed. GI also consulted, planning for colonoscopy when optimized by cardiology. IR consulted, most recent CT without active extravasation. Earlier in the night patient went to bathroom and became diaphoretic, light headed after multiple (reported 8-10) bloody bm. Transfusion of prbc started, RRT called to assist with assessment/transfusion. Received 3prbc, 1ffp and 1plateles as well.      24 HOUR EVENTS:  77y Male, SICU consulted for GI bleed. Patient s/p 3u PRBCs, 1u plts, 1u ffp. Medical team concerned that patient unable to be sufficiently transfused on floor.   Hemoglobin is not stable. Will not list this patient for floor. Will undergo colonoscopy tomorrow.      SUBJECTIVE/ROS:  [ ] A ten-point review of systems was otherwise negative except as noted.  [ ] Due to altered mental status/intubation, subjective information were not able to be obtained from the patient. History was obtained, to the extent possible, from review of the chart and collateral sources of information.      NEURO  RASS:     GCS:     CAM ICU:  Exam: awake, alert, oriented  Meds: acetaminophen     Tablet .. 650 milliGRAM(s) Oral every 6 hours PRN Temp greater or equal to 38C (100.4F), Mild Pain (1 - 3)  gabapentin 400 milliGRAM(s) Oral daily  melatonin 3 milliGRAM(s) Oral at bedtime PRN Insomnia  ondansetron Injectable 4 milliGRAM(s) IV Push every 8 hours PRN Nausea and/or Vomiting    [x] Adequacy of sedation and pain control has been assessed and adjusted      RESPIRATORY  RR: 18 (09-26-22 @ 11:00) (12 - 22)  SpO2: 100% (09-26-22 @ 11:00) (100% - 100%)  Wt(kg): --  Exam: unlabored, clear to auscultation bilaterally  Mechanical Ventilation:     [N/A] Extubation Readiness Assessed  Meds:       CARDIOVASCULAR  HR: 67 (09-26-22 @ 11:00) (58 - 72)  BP: 144/70 (09-26-22 @ 11:00) (123/81 - 177/68)  BP(mean): 90 (09-26-22 @ 11:00) (90 - 95)  ABP: --  ABP(mean): --  Wt(kg): --  CVP(cm H2O): --  VBG - ( 26 Sep 2022 01:41 )  pH: 7.25  /  pCO2: 36    /  pO2: 47    / HCO3: 16    / Base Excess: -10.6 /  SaO2: 79.7   Lactate: 2.3                Exam: regular rate and rhythm  Cardiac Rhythm: sinus  Perfusion     [x]Adequate   [ ]Inadequate  Mentation   [x]Normal       [ ]Reduced  Extremities  [x]Warm         [ ]Cool  Volume Status [ ]Hypervolemic [x]Euvolemic [ ]Hypovolemic  Meds: amLODIPine   Tablet 10 milliGRAM(s) Oral at bedtime  carvedilol 12.5 milliGRAM(s) Oral every 12 hours        GI/NUTRITION  Exam: soft, nontender, nondistended, incision C/D/I  Diet:  Meds: aluminum hydroxide/magnesium hydroxide/simethicone Suspension 30 milliLiter(s) Oral every 4 hours PRN Dyspepsia  bisacodyl 5 milliGRAM(s) Oral every 12 hours  pantoprazole  Injectable 40 milliGRAM(s) IV Push two times a day      GENITOURINARY  I&O's Detail    09-25 @ 07:01  -  09-26 @ 07:00  --------------------------------------------------------  IN:    Lactated Ringers: 75 mL  Total IN: 75 mL    OUT:    Blood Loss (mL): 4100 mL    Stool (mL): 4100 mL    Voided (mL): 0 mL  Total OUT: 8200 mL    Total NET: -8125 mL      09-26 @ 07:01  -  09-26 @ 11:34  --------------------------------------------------------  IN:    Lactated Ringers: 225 mL    Lactated Ringers: 100 mL    Oral Fluid: 100 mL  Total IN: 425 mL    OUT:    Voided (mL): 250 mL  Total OUT: 250 mL    Total NET: 175 mL        Weight (kg): 85.8 (09-26 @ 04:45)  09-26    134<L>  |  106  |  67<H>  ----------------------------<  165<H>  6.1<H>   |  15<L>  |  4.38<H>    Ca    8.3<L>      26 Sep 2022 06:00  Phos  4.0     09-26  Mg     1.70     09-26    TPro  5.5<L>  /  Alb  3.1<L>  /  TBili  0.5  /  DBili  x   /  AST  11  /  ALT  <5<L>  /  AlkPhos  60  09-25    [ ] Dobbs catheter, indication: N/A  Meds: lactated ringers. 1000 milliLiter(s) IV Continuous <Continuous>        HEMATOLOGIC  Meds:   [x] VTE Prophylaxis                        8.3    8.72  )-----------( 182      ( 26 Sep 2022 11:10 )             24.3     PT/INR - ( 25 Sep 2022 23:05 )   PT: 12.9 sec;   INR: 1.11 ratio         PTT - ( 25 Sep 2022 23:05 )  PTT:28.5 sec  Transfusion     [ ] PRBC   [ ] Platelets   [ ] FFP   [ ] Cryoprecipitate      INFECTIOUS DISEASES  WBC Count: 8.72 K/uL (09-26 @ 11:10)  WBC Count: 8.81 K/uL (09-26 @ 06:00)  WBC Count: 16.66 K/uL (09-26 @ 01:41)  WBC Count: 9.03 K/uL (09-25 @ 23:05)  WBC Count: 6.94 K/uL (09-25 @ 20:13)    RECENT CULTURES:    Meds:       ENDOCRINE  CAPILLARY BLOOD GLUCOSE      POCT Blood Glucose.: 98 mg/dL (26 Sep 2022 11:13)  POCT Blood Glucose.: 178 mg/dL (26 Sep 2022 06:22)  POCT Blood Glucose.: 208 mg/dL (26 Sep 2022 01:29)  POCT Blood Glucose.: 141 mg/dL (25 Sep 2022 23:47)  POCT Blood Glucose.: 119 mg/dL (25 Sep 2022 22:29)  POCT Blood Glucose.: 104 mg/dL (25 Sep 2022 17:43)  POCT Blood Glucose.: 183 mg/dL (25 Sep 2022 12:47)    Meds: allopurinol 100 milliGRAM(s) Oral daily  atorvastatin 20 milliGRAM(s) Oral at bedtime  dextrose 50% Injectable 25 Gram(s) IV Push once  dextrose 50% Injectable 25 Gram(s) IV Push once  insulin lispro (ADMELOG) corrective regimen sliding scale   SubCutaneous every 6 hours        ACCESS DEVICES:  [ ] Peripheral IV  [ ] Central Venous Line	[ ] R	[ ] L	[ ] IJ	[ ] Fem	[ ] SC	Placed:   [ ] Arterial Line		[ ] R	[ ] L	[ ] Fem	[ ] Rad	[ ] Ax	Placed:   [ ] PICC:					[ ] Mediport  [ ] Urinary Catheter, Date Placed:   [x] Necessity of urinary, arterial, and venous catheters discussed    OTHER MEDICATIONS:  chlorhexidine 4% Liquid 1 Application(s) Topical daily      CODE STATUS:      IMAGING:

## 2022-09-26 NOTE — DIETITIAN INITIAL EVALUATION ADULT - PERTINENT LABORATORY DATA
09-26    134<L>  |  106  |  67<H>  ----------------------------<  165<H>  6.1<H>   |  15<L>  |  4.38<H>    Ca    8.3<L>      26 Sep 2022 06:00  Phos  4.0     09-26  Mg     1.70     09-26    TPro  5.5<L>  /  Alb  3.1<L>  /  TBili  0.5  /  DBili  x   /  AST  11  /  ALT  <5<L>  /  AlkPhos  60  09-25  POCT Blood Glucose.: 98 mg/dL (09-26-22 @ 11:13)  A1C with Estimated Average Glucose Result: 7.6 % (09-25-22 @ 04:20)

## 2022-09-26 NOTE — CONSULT NOTE ADULT - ASSESSMENT
ASSESSMENT: 76 yo M pmh CAD s/p 1stent ([facility] 12/2021), HLD, GERD, gout, DM, HTN, ?a-fib (patient denies), on plavix (last dose 9/22, also on aspirin) presents complaining of sudden onset BRBPR this morning. Surgery consulted for evaluation of lower GI bleed.    PLAN:    - GI, IR on board for scop or angiogram as indicated, weighing risks/benefits of cardiac comorbidities and RADHA  - Transfuse as needed, hold antiplatelet agents, monitor hemodynamics  - Surgery available if bleeding becomes refractory to transfusion, GI, IR interventions or is hemodynamically unstable  - Serial Hgb, consider TEG with next CBC to correct coagulopathies as patient takes plavix, aspirin      Plan discussed with Dr. Parmar.    Jonathan Boswell  General Surgery  B Team  [id number]

## 2022-09-26 NOTE — RAPID RESPONSE TEAM SUMMARY - NSOTHERINTERVENTIONSRRT_GEN_ALL_CORE
Due to multiple large bloody BM/ active hemorrhage, RRT called to run blood as fast as possible. 2nd unit was hung before rapid ended. This unit will be given over 2 hours. Fluids given until blood came up from blood bank; they remain at bedside and can be given along with 2nd unit of blood. No contraindication to giving fluid/blood. This was all explained to the patient. Source is likely brisk lower from ?diverticulosis vs AVM. BP meds held and patient made NPO  Due to multiple large bloody BM/ active hemorrhage, RRT called to run first unit of blood as fast as possible. 2nd unit was hung before rapid ended. This unit will be given over 2 hours. Fluids given until blood came up from blood bank; they remain at bedside and can be given along with 2nd unit of blood. No contraindication to giving fluid/blood. This was all explained to the patient. Source is likely brisk lower from ?diverticulosis vs AVM. BP meds held and patient made NPO. Prior to rapid 2nd large bore IV established. Patient with consent for blood in chart and active t&s

## 2022-09-26 NOTE — DIETITIAN INITIAL EVALUATION ADULT - OTHER INFO
A&Ox4. NPO x2 days for GIB. No reported GI issues (nausea/vomiting/diarrhea/constipation.) Denies any chewing or swallowing difficulties at this time. NKFA. Pt reports weight loss in past 1.5 years; states his portions are smaller. Managing DM well (A1c 7.6).

## 2022-09-26 NOTE — PROVIDER CONTACT NOTE (OTHER) - ASSESSMENT
Patient is expelling large amounts of blood and large clots every 5 minutes. Patients vitals stable and patient is mentating fine

## 2022-09-26 NOTE — CONSULT NOTE ADULT - TIME BILLING
- Review of records, telemetry, vital signs and daily labs.   - General and cardiovascular physical examination.  - Generation of cardiovascular treatment plan.  - Coordination of care.      Patient was seen and examined by me on 9/25/22,interim events noted,labs and radiology studies reviewed.  Khanh Juan MD,FACC.  [address] [place], [place] [id number].  [phone]
- Review of records, telemetry, vital signs and daily labs.   - General and cardiovascular physical examination.  - Generation of cardiovascular treatment plan.  - Coordination of care.      Patient was seen and examined by me on 09/26/2022,interim events noted,labs and radiology studies reviewed.  Khanh Juan MD,FACC.  [address] [place], [place] [id number].  [phone]

## 2022-09-26 NOTE — DIETITIAN INITIAL EVALUATION ADULT - ADD RECOMMEND
1. Advance diet as tolerated to consistent carbohydrate, low sodium. consider potassium restriction if levels continue to be elevated.

## 2022-09-26 NOTE — PROVIDER CONTACT NOTE (OTHER) - REASON
Patient is bleeding out of rectum, passing clots and large amounts of bright red blood
Patient is dizzy and lethargic.
Patient is bleeding out of rectum, passing clots and large amounts of bright red blood

## 2022-09-26 NOTE — CONSULT NOTE ADULT - SUBJECTIVE AND OBJECTIVE BOX
DATE OF SERVICE:09-26-22 @ 08:24    Patient was seen,examined and evaluated  by me.ER evaluation, Labs and Hospital course was reviewed,    CHIEF COMPLAINT:BRBPR    HPI:76 yo M pmh CAD s/p 1stent ([facility] 12/2021), HLD, GERD, gout, T2DM, HTN, Atrial Fib on plavix presents complaining of sudden onset BRBPR. Patient has had 4 episodes total today, 2 at home and two while in the ED, states that every time the blood has filled the toilet bowl. Denies any prodromal chest pain, shortness of breath, palpitations or sensations of irregular heart rate,, abdominal pain, cramping, nausea, vomiting, diarrhea blood in urine or stool prior to episodes today. No recent NSAID use, history of GI bleeds, personal or family history of cancer. Last colonoscopy 3 years ago showed polyps that required clips.       Patient initially admitted to medicine. Surgery consulted for GI bleed, concern for diverticular bleed. GI also consulted, planning for colonoscopy when optimized . IR consulted, most recent CT without active extravasation. Earlier yesterday  night patient went to bathroom and became diaphoretic, light headed after multiple (reported 8-10) bloody bm.   Transfusion of prbc started, RRT called to assist with assessment/transfusion. Received 3prbc, 1ffp and 1plateles as well.  Now in SICU has active bleeding AK         PAST MEDICAL & SURGICAL HISTORY:  DM (diabetes mellitus)  Gout  HTN (hypertension)  BPH (benign prostatic hyperplasia)  H/O eye surgery  CAD s/p [facility] 12/2021        MEDICATIONS  (STANDING):  allopurinol 100 milliGRAM(s) Oral daily  amLODIPine   Tablet 10 milliGRAM(s) Oral at bedtime  atorvastatin 20 milliGRAM(s) Oral at bedtime  carvedilol 12.5 milliGRAM(s) Oral every 12 hours  chlorhexidine 4% Liquid 1 Application(s) Topical daily  dextrose 50% Injectable 25 Gram(s) IV Push once  dextrose 50% Injectable 25 Gram(s) IV Push once  gabapentin 400 milliGRAM(s) Oral daily  insulin lispro (ADMELOG) corrective regimen sliding scale   SubCutaneous every 6 hours  lactated ringers. 1000 milliLiter(s) (75 mL/Hr) IV Continuous <Continuous>  pantoprazole  Injectable 40 milliGRAM(s) IV Push two times a day    MEDICATIONS  (PRN):  acetaminophen     Tablet .. 650 milliGRAM(s) Oral every 6 hours PRN Temp greater or equal to 38C (100.4F), Mild Pain (1 - 3)  aluminum hydroxide/magnesium hydroxide/simethicone Suspension 30 milliLiter(s) Oral every 4 hours PRN Dyspepsia  melatonin 3 milliGRAM(s) Oral at bedtime PRN Insomnia  ondansetron Injectable 4 milliGRAM(s) IV Push every 8 hours PRN Nausea and/or Vomiting      FAMILY HISTORY:  Family history of diabetes mellitus (Mother)    Family history of hypertension (Mother  No family history of premature coronary artery disease or sudden cardiac death    SOCIAL HISTORY:  Smoking-[ ] Active  [ ] Former [x ] Non Smoker  Alcohol-[x ] Denies [ ] Social [ ] Daily  Ilicit Drug use-[x ] Denies [ ] Active user    REVIEW OF SYSTEMS:  Constitutional: [ ] fever, [ ]weight loss, [x ]fatigue   Activity [ ] Bedbound,[x ] Ambulates [x ] Unassisted[ ] Cane/Walker [ ] Assistence.  Effort tolerance:[ ] Excellent [ ] Good [ ] Fair [x ] Poor [ ]  Eyes: [ ] visual changes  Respiratory: [ ]shortness of breath;  [ ] cough, [ ]wheezing, [ ]chills, [ ]hemoptysis  Cardiovascular: [ ] chest pain, [ ]palpitations, [ ]dizziness,  [ ]leg swelling[ ]orthopnea [ ]PND  Gastrointestinal: [ ] abdominal pain, [ ]nausea, [ ]vomiting,  [ ]diarrhea,[ ]constipation[x]BRBPR  Genitourinary: [ ] dysuria, [ ] hematuria  Neurologic: [ ] headaches [ ] tremors[ ] weakness  Skin: [ ] itching, [ ]burning, [ ] rashes  Endocrine: [ ] heat or cold intolerance  Musculoskeletal: [ ] joint pain or swelling; [ ] muscle, back, or extremity pain  Psychiatric: [ ] depression, [ ]anxiety, [ ]mood swings, or [ ]difficulty sleeping  Hematologic: [ ] easy bruising, [ ] bleeding gums       [ x] All others negative	  [ ] Unable to obtain    Vital Signs Last 24 Hrs  T(C): 36.6 (26 Sep 2022 08:00), Max: 36.8 (26 Sep 2022 00:05)  T(F): 97.9 (26 Sep 2022 08:00), Max: 98.2 (26 Sep 2022 00:05)  HR: 67 (26 Sep 2022 08:00) (58 - 72)  BP: 152/72 (26 Sep 2022 08:00) (123/81 - 177/68)  BP(mean): 94 (26 Sep 2022 08:00) (90 - 95)  RR: 13 (26 Sep 2022 08:00) (12 - 22)  SpO2: 100% (26 Sep 2022 08:00) (100% - 100%)    Parameters below as of 26 Sep 2022 08:00  Patient On (Oxygen Delivery Method): room air      I&O's Summary    25 Sep 2022 07:01  -  26 Sep 2022 07:00  --------------------------------------------------------  IN: 75 mL / OUT: 8200 mL / NET: -8125 mL    26 Sep 2022 07:01  -  26 Sep 2022 08:24  --------------------------------------------------------  IN: 75 mL / OUT: 0 mL / NET: 75 mL        PHYSICAL EXAM:  General: No acute distress BMI-31  HEENT: EOMI, PERRL[ ] Icteric  Neck: Supple, No JVD  Lungs: Equal air entry bilaterally; [ ] Rales [ ] Rhonchi [ ] Wheezing  Heart: Regular rate and rhythm;[x ] Murmurs-   2/6 [x ] Systolic [ ] Diastolic [ ] Radiation,No rubs, or gallops  Abdomen: Nontender, bowel sounds present  Extremities: No clubbing, cyanosis, or edema[ ] Calf tenderness  Nervous system:  Alert & Oriented X3, no focal deficits  Psychiatric: Normal affect  Skin: No rashes or lesions      LABS:  09-26    134<L>  |  106  |  67<H>  ----------------------------<  165<H>  6.1<H>   |  15<L>  |  4.38<H>    Ca    8.3<L>      26 Sep 2022 06:00  Phos  4.0     09-26  Mg     1.70     09-26    TPro  5.5<L>  /  Alb  3.1<L>  /  TBili  0.5  /  DBili  x   /  AST  11  /  ALT  <5<L>  /  AlkPhos  60  09-25    Creatinine Trend: 4.38<--, 3.90<--, 3.88<--, 3.83<--, 3.88<--, 3.90<--                        9.3 <--8.5 <--9.0 <--9.5  x     )-----------( 197      ( 26 Sep 2022 06:00 )             27.0     PT/INR - ( 25 Sep 2022 23:05 )   PT: 12.9 sec;   INR: 1.11 ratio         PTT - ( 25 Sep 2022 23:05 )  PTT:28.5 sec    Serum Pro-Brain Natriuretic Peptide: 853 pg/mL (09-24-22 @ 10:11)        RADIOLOGY:   CT ABDOMEN AND PELVIS WAW IC                        IMPRESSION:  No CT evidence of active GI bleed.    ECG [my interpretation]:  Sinus Rhythm  Inferolateral TWI unchanged from prior    TELEMETRY:  Sinus Rhythm

## 2022-09-26 NOTE — CONSULT NOTE ADULT - ASSESSMENT
ASSESSMENT:  77y Male, SICU consulted for GI bleed. Patient s/p 3u PRBCs, 1u plts, 1u ffp. Medical team concerned that patient unable to be sufficiently transfused on floor.     PLAN:   Neurologic:   - Patient states is feeling well w/o pain.    Respiratory:  - Continue to monitor     Cardiovascular:   - Patient currently normotensive to hypertensive.  - Continue home medications    Gastrointestinal/Nutrition:   - Continue to monitor for active blee    Hematologic:   - S/p 3u PRBcs, 1u plt, 1u ffp  - f/u TEG, as patient on aspirin/plavix at home  - transfuse as needed    Endocrine:   - ISS  -------------------------------------------------------------------------------------     ASSESSMENT:  77y Male, SICU consulted for GI bleed. Patient s/p 3u PRBCs, 1u plts, 1u ffp. Medical team concerned that patient unable to be sufficiently transfused on floor.     PLAN:   Neurologic:   - Patient states is feeling well w/o pain.    Respiratory:  - Continue to monitor     Cardiovascular:   - Patient currently normotensive to hypertensive.  - Continue home medications    Gastrointestinal/Nutrition:   - Continue to monitor for active bleed  - IR following  - GI consult  - CBC q6hr    Hematologic:   - S/p 3u PRBcs, 1u plt, 1u ffp  - f/u TEG, as patient on aspirin/plavix at home  - transfuse as needed, hold anticoagulation    Endocrine:   - ISS  -------------------------------------------------------------------------------------

## 2022-09-27 LAB
ANION GAP SERPL CALC-SCNC: 14 MMOL/L — SIGNIFICANT CHANGE UP (ref 7–14)
APTT BLD: 23.2 SEC — LOW (ref 27–36.3)
BUN SERPL-MCNC: 65 MG/DL — HIGH (ref 7–23)
CALCIUM SERPL-MCNC: 8.5 MG/DL — SIGNIFICANT CHANGE UP (ref 8.4–10.5)
CHLORIDE SERPL-SCNC: 106 MMOL/L — SIGNIFICANT CHANGE UP (ref 98–107)
CO2 SERPL-SCNC: 16 MMOL/L — LOW (ref 22–31)
CREAT SERPL-MCNC: 4.44 MG/DL — HIGH (ref 0.5–1.3)
EGFR: 13 ML/MIN/1.73M2 — LOW
GLUCOSE BLDC GLUCOMTR-MCNC: 110 MG/DL — HIGH (ref 70–99)
GLUCOSE BLDC GLUCOMTR-MCNC: 118 MG/DL — HIGH (ref 70–99)
GLUCOSE BLDC GLUCOMTR-MCNC: 138 MG/DL — HIGH (ref 70–99)
GLUCOSE BLDC GLUCOMTR-MCNC: 178 MG/DL — HIGH (ref 70–99)
GLUCOSE SERPL-MCNC: 114 MG/DL — HIGH (ref 70–99)
HCT VFR BLD CALC: 25.4 % — LOW (ref 39–50)
HCT VFR BLD CALC: 27 % — LOW (ref 39–50)
HGB BLD-MCNC: 8.8 G/DL — LOW (ref 13–17)
HGB BLD-MCNC: 9.2 G/DL — LOW (ref 13–17)
INR BLD: 1.1 RATIO — SIGNIFICANT CHANGE UP (ref 0.88–1.16)
MAGNESIUM SERPL-MCNC: 1.7 MG/DL — SIGNIFICANT CHANGE UP (ref 1.6–2.6)
MCHC RBC-ENTMCNC: 28.6 PG — SIGNIFICANT CHANGE UP (ref 27–34)
MCHC RBC-ENTMCNC: 28.9 PG — SIGNIFICANT CHANGE UP (ref 27–34)
MCHC RBC-ENTMCNC: 34.1 GM/DL — SIGNIFICANT CHANGE UP (ref 32–36)
MCHC RBC-ENTMCNC: 34.6 GM/DL — SIGNIFICANT CHANGE UP (ref 32–36)
MCV RBC AUTO: 83.3 FL — SIGNIFICANT CHANGE UP (ref 80–100)
MCV RBC AUTO: 83.9 FL — SIGNIFICANT CHANGE UP (ref 80–100)
NRBC # BLD: 0 /100 WBCS — SIGNIFICANT CHANGE UP (ref 0–0)
NRBC # BLD: 0 /100 WBCS — SIGNIFICANT CHANGE UP (ref 0–0)
NRBC # FLD: 0 K/UL — SIGNIFICANT CHANGE UP (ref 0–0)
NRBC # FLD: 0 K/UL — SIGNIFICANT CHANGE UP (ref 0–0)
PHOSPHATE SERPL-MCNC: 3.1 MG/DL — SIGNIFICANT CHANGE UP (ref 2.5–4.5)
PLATELET # BLD AUTO: 195 K/UL — SIGNIFICANT CHANGE UP (ref 150–400)
PLATELET # BLD AUTO: 202 K/UL — SIGNIFICANT CHANGE UP (ref 150–400)
POTASSIUM SERPL-MCNC: 4.1 MMOL/L — SIGNIFICANT CHANGE UP (ref 3.5–5.3)
POTASSIUM SERPL-SCNC: 4.1 MMOL/L — SIGNIFICANT CHANGE UP (ref 3.5–5.3)
PROTHROM AB SERPL-ACNC: 12.8 SEC — SIGNIFICANT CHANGE UP (ref 10.5–13.4)
RBC # BLD: 3.05 M/UL — LOW (ref 4.2–5.8)
RBC # BLD: 3.22 M/UL — LOW (ref 4.2–5.8)
RBC # FLD: 14.2 % — SIGNIFICANT CHANGE UP (ref 10.3–14.5)
RBC # FLD: 14.6 % — HIGH (ref 10.3–14.5)
SARS-COV-2 RNA SPEC QL NAA+PROBE: SIGNIFICANT CHANGE UP
SODIUM SERPL-SCNC: 136 MMOL/L — SIGNIFICANT CHANGE UP (ref 135–145)
WBC # BLD: 8.13 K/UL — SIGNIFICANT CHANGE UP (ref 3.8–10.5)
WBC # BLD: 8.71 K/UL — SIGNIFICANT CHANGE UP (ref 3.8–10.5)
WBC # FLD AUTO: 8.13 K/UL — SIGNIFICANT CHANGE UP (ref 3.8–10.5)
WBC # FLD AUTO: 8.71 K/UL — SIGNIFICANT CHANGE UP (ref 3.8–10.5)

## 2022-09-27 PROCEDURE — 99233 SBSQ HOSP IP/OBS HIGH 50: CPT | Mod: GC,25

## 2022-09-27 PROCEDURE — 45378 DIAGNOSTIC COLONOSCOPY: CPT | Mod: GC

## 2022-09-27 PROCEDURE — 99233 SBSQ HOSP IP/OBS HIGH 50: CPT

## 2022-09-27 RX ORDER — HYDRALAZINE HCL 50 MG
10 TABLET ORAL ONCE
Refills: 0 | Status: COMPLETED | OUTPATIENT
Start: 2022-09-27 | End: 2022-09-27

## 2022-09-27 RX ORDER — MAGNESIUM SULFATE 500 MG/ML
2 VIAL (ML) INJECTION ONCE
Refills: 0 | Status: COMPLETED | OUTPATIENT
Start: 2022-09-27 | End: 2022-09-27

## 2022-09-27 RX ORDER — LABETALOL HCL 100 MG
200 TABLET ORAL EVERY 8 HOURS
Refills: 0 | Status: DISCONTINUED | OUTPATIENT
Start: 2022-09-27 | End: 2022-09-30

## 2022-09-27 RX ORDER — SODIUM CHLORIDE 9 MG/ML
1000 INJECTION, SOLUTION INTRAVENOUS
Refills: 0 | Status: DISCONTINUED | OUTPATIENT
Start: 2022-09-27 | End: 2022-09-27

## 2022-09-27 RX ORDER — INSULIN LISPRO 100/ML
VIAL (ML) SUBCUTANEOUS
Refills: 0 | Status: DISCONTINUED | OUTPATIENT
Start: 2022-09-27 | End: 2022-09-27

## 2022-09-27 RX ORDER — INSULIN LISPRO 100/ML
VIAL (ML) SUBCUTANEOUS
Refills: 0 | Status: DISCONTINUED | OUTPATIENT
Start: 2022-09-27 | End: 2022-09-30

## 2022-09-27 RX ORDER — SOD SULF/SODIUM/NAHCO3/KCL/PEG
1000 SOLUTION, RECONSTITUTED, ORAL ORAL ONCE
Refills: 0 | Status: COMPLETED | OUTPATIENT
Start: 2022-09-27 | End: 2022-09-27

## 2022-09-27 RX ADMIN — CARVEDILOL PHOSPHATE 12.5 MILLIGRAM(S): 80 CAPSULE, EXTENDED RELEASE ORAL at 06:16

## 2022-09-27 RX ADMIN — GABAPENTIN 400 MILLIGRAM(S): 400 CAPSULE ORAL at 12:35

## 2022-09-27 RX ADMIN — Medication 100 MILLIGRAM(S): at 12:35

## 2022-09-27 RX ADMIN — Medication 1000 MILLILITER(S): at 08:45

## 2022-09-27 RX ADMIN — ATORVASTATIN CALCIUM 20 MILLIGRAM(S): 80 TABLET, FILM COATED ORAL at 21:36

## 2022-09-27 RX ADMIN — AMLODIPINE BESYLATE 10 MILLIGRAM(S): 2.5 TABLET ORAL at 21:36

## 2022-09-27 RX ADMIN — CARVEDILOL PHOSPHATE 12.5 MILLIGRAM(S): 80 CAPSULE, EXTENDED RELEASE ORAL at 17:01

## 2022-09-27 RX ADMIN — PANTOPRAZOLE SODIUM 40 MILLIGRAM(S): 20 TABLET, DELAYED RELEASE ORAL at 17:01

## 2022-09-27 RX ADMIN — Medication 1 PATCH: at 18:52

## 2022-09-27 RX ADMIN — Medication 10 MILLIGRAM(S): at 13:40

## 2022-09-27 RX ADMIN — Medication 200 MILLIGRAM(S): at 09:36

## 2022-09-27 RX ADMIN — CHLORHEXIDINE GLUCONATE 1 APPLICATION(S): 213 SOLUTION TOPICAL at 07:00

## 2022-09-27 RX ADMIN — SODIUM CHLORIDE 75 MILLILITER(S): 9 INJECTION, SOLUTION INTRAVENOUS at 02:41

## 2022-09-27 RX ADMIN — Medication 1 PATCH: at 10:28

## 2022-09-27 RX ADMIN — Medication 3 MILLIGRAM(S): at 21:36

## 2022-09-27 RX ADMIN — Medication 10 MILLIGRAM(S): at 13:15

## 2022-09-27 RX ADMIN — Medication 25 GRAM(S): at 02:41

## 2022-09-27 RX ADMIN — PANTOPRAZOLE SODIUM 40 MILLIGRAM(S): 20 TABLET, DELAYED RELEASE ORAL at 06:16

## 2022-09-27 RX ADMIN — Medication 2: at 21:43

## 2022-09-27 RX ADMIN — Medication 200 MILLIGRAM(S): at 17:01

## 2022-09-27 RX ADMIN — SODIUM CHLORIDE 75 MILLILITER(S): 9 INJECTION, SOLUTION INTRAVENOUS at 08:45

## 2022-09-27 NOTE — PROGRESS NOTE ADULT - ASSESSMENT
ASSESSMENT: 76 yo M pmh CAD s/p 1stent ([facility] 12/2021), HLD, GERD, gout, DM, HTN, ?a-fib (patient denies), on plavix (last dose 9/22, also on aspirin) presents complaining of sudden onset BRBPR this morning. Surgery consulted for evaluation of lower GI bleed.    PLAN:    - GI, IR on board for scop or angiogram as indicated, weighing risks/benefits of cardiac comorbidities and RADHA  - Transfuse as needed, hold antiplatelet agents, monitor hemodynamics  - Surgery available if bleeding becomes refractory to transfusion, GI, IR interventions or is hemodynamically unstable  - Serial Hgb, consider TEG with next CBC to correct coagulopathies as patient takes plavix, aspirin        General Surgery  B Team  [id number] ASSESSMENT: 78 yo M pmh CAD s/p 1stent ([facility] 12/2021), HLD, GERD, gout, DM, HTN, ?a-fib (patient denies), on plavix (last dose 9/22, also on aspirin) presents complaining of sudden onset BRBPR this morning. Surgery consulted for evaluation of lower GI bleed.    PLAN:    - Colonoscopy F/U  - GI, IR on board for scop or angiogram as indicated, weighing risks/benefits of cardiac comorbidities and RADHA  - Transfuse as needed, hold antiplatelet agents, monitor hemodynamics  - Surgery available if bleeding becomes refractory to transfusion, GI, IR interventions or is hemodynamically unstable  - Serial Hgb, consider TEG with next CBC to correct coagulopathies as patient takes plavix, aspirin        General Surgery  B Team  [id number] ASSESSMENT: 76 yo M pmh CAD s/p 1stent ([facility] 12/2021), HLD, GERD, gout, DM, HTN, ?a-fib (patient denies), on plavix (last dose 9/22, also on aspirin) presents complaining of sudden onset BRBPR this morning. Surgery consulted for evaluation of lower GI bleed.    PLAN:    - Colonoscopy today with GI  - Transfuse as needed, hold antiplatelet agents, monitor hemodynamics  - Surgery available if bleeding becomes refractory to transfusion, GI, IR interventions or is hemodynamically unstable  - cont to trend CBC        General Surgery  B Team  [id number]

## 2022-09-27 NOTE — PROGRESS NOTE ADULT - ASSESSMENT
77y Male, SICU consulted for GI bleed. Patient s/p 3u PRBCs, 1u plts, 1u ffp. Medical team concerned that patient unable to be sufficiently transfused on floor. Hemoglobin is not stable. Will not list this patient for floor. Will undergo colonoscopy tomorrow.     PLAN:   Neurologic:   - Patient states is feeling well w/o pain.  - tylenol prn, gabapentin daily    Respiratory:  - Continue to monitor  - 02 sat >96% on RA     Cardiovascular:   - Patient currently normotensive to hypertensive.  - Continue home medications. Restart home labetalol, clonidine  - Given 1x hydralazine 10 mg IVP for sustained hypertension after exertion    Gastrointestinal/Nutrition:   - Continue to monitor for active bleeding  - Will undergo colonoscopy today. NPO since midnight.    /Renal  - LR @ 75ml/hr  - Strict IO&O's    Hematologic:   - S/p 4u PRBcs, 1u plt, 1u ffp  - F/u TEG, as patient on aspirin/plavix at home  - Transfuse as needed    Endocrine:   - ISS

## 2022-09-27 NOTE — PROGRESS NOTE ADULT - SUBJECTIVE AND OBJECTIVE BOX
SICU DAILY PROGRESS NOTE    24 HOUR EVENTS:  - 1 episode of BRBPR (800cc), remains hemodynamically stable, CBC stable  - prep for colonoscopy tomorrow with hgb >8, K <5   - 1x hydralazine 10mg IVP for sustained htn after exertion    SUBJECTIVE/ROS:  [x] A ten-point review of systems was otherwise negative except as noted.  [ ] Due to altered mental status/intubation, subjective information were not able to be obtained from the patient. History was obtained, to the extent possible, from review of the chart and collateral sources of information.      NEURO  RASS:     GCS:     CAM ICU:  Exam: awake, alert, oriented  Meds: acetaminophen     Tablet .. 650 milliGRAM(s) Oral every 6 hours PRN Temp greater or equal to 38C (100.4F), Mild Pain (1 - 3)  gabapentin 400 milliGRAM(s) Oral daily  melatonin 3 milliGRAM(s) Oral at bedtime PRN Insomnia  ondansetron Injectable 4 milliGRAM(s) IV Push every 8 hours PRN Nausea and/or Vomiting    [x] Adequacy of sedation and pain control has been assessed and adjusted      RESPIRATORY  RR: 17 (09-27-22 @ 01:00) (12 - 22)  SpO2: 100% (09-27-22 @ 01:00) (98% - 100%)  Wt(kg): --  Exam: unlabored, clear to auscultation bilaterally  Mechanical Ventilation:     [N/A] Extubation Readiness Assessed  Meds:       CARDIOVASCULAR  HR: 69 (09-27-22 @ 01:00) (58 - 83)  BP: 139/67 (09-27-22 @ 01:00) (123/81 - 169/73)  BP(mean): 88 (09-27-22 @ 01:00) (72 - 99)  ABP: --  ABP(mean): --  Wt(kg): --  CVP(cm H2O): --  VBG - ( 26 Sep 2022 01:41 )  pH: 7.25  /  pCO2: 36    /  pO2: 47    / HCO3: 16    / Base Excess: -10.6 /  SaO2: 79.7   Lactate: 2.3                Exam: regular rate and rhythm  Cardiac Rhythm: sinus  Perfusion     [x]Adequate   [ ]Inadequate  Mentation   [x]Normal       [ ]Reduced  Extremities  [x]Warm         [ ]Cool  Volume Status [ ]Hypervolemic [x]Euvolemic [ ]Hypovolemic  Meds: amLODIPine   Tablet 10 milliGRAM(s) Oral at bedtime  carvedilol 12.5 milliGRAM(s) Oral every 12 hours        GI/NUTRITION  Exam: soft, nontender, nondistended, incision C/D/I  Diet:  Meds: aluminum hydroxide/magnesium hydroxide/simethicone Suspension 30 milliLiter(s) Oral every 4 hours PRN Dyspepsia  bisacodyl 5 milliGRAM(s) Oral every 12 hours  pantoprazole  Injectable 40 milliGRAM(s) IV Push two times a day      GENITOURINARY  I&O's Detail    09-25 @ 07:01  -  09-26 @ 07:00  --------------------------------------------------------  IN:    Lactated Ringers: 75 mL  Total IN: 75 mL    OUT:    Blood Loss (mL): 4100 mL    Stool (mL): 4100 mL    Voided (mL): 0 mL  Total OUT: 8200 mL    Total NET: -8125 mL      09-26 @ 07:01  -  09-27 @ 01:30  --------------------------------------------------------  IN:    IV PiggyBack: 300 mL    Lactated Ringers: 225 mL    Lactated Ringers: 350 mL    Oral Fluid: 3100 mL  Total IN: 3975 mL    OUT:    Stool (mL): 2300 mL    Voided (mL): 250 mL  Total OUT: 2550 mL    Total NET: 1425 mL        Weight (kg): 85.8 (09-26 @ 04:45)  09-26    135  |  108<H>  |  70<H>  ----------------------------<  97  5.1   |  18<L>  |  4.44<H>    Ca    8.3<L>      26 Sep 2022 11:10  Phos  4.2     09-26  Mg     1.70     09-26    TPro  5.5<L>  /  Alb  3.1<L>  /  TBili  0.5  /  DBili  x   /  AST  11  /  ALT  <5<L>  /  AlkPhos  60  09-25    [ ] Dobbs catheter, indication: N/A  Meds:       HEMATOLOGIC  Meds:   [x] VTE Prophylaxis                        7.9    9.17  )-----------( 200      ( 26 Sep 2022 22:00 )             23.1     PT/INR - ( 25 Sep 2022 23:05 )   PT: 12.9 sec;   INR: 1.11 ratio         PTT - ( 25 Sep 2022 23:05 )  PTT:28.5 sec  Transfusion     [ ] PRBC   [ ] Platelets   [ ] FFP   [ ] Cryoprecipitate      INFECTIOUS DISEASES  WBC Count: 9.17 K/uL (09-26 @ 22:00)  WBC Count: 10.05 K/uL (09-26 @ 15:15)  WBC Count: 9.32 K/uL (09-26 @ 13:37)  WBC Count: 8.72 K/uL (09-26 @ 11:10)  WBC Count: 8.81 K/uL (09-26 @ 06:00)  WBC Count: 16.66 K/uL (09-26 @ 01:41)    RECENT CULTURES:    Meds:       ENDOCRINE  CAPILLARY BLOOD GLUCOSE      POCT Blood Glucose.: 155 mg/dL (26 Sep 2022 23:03)  POCT Blood Glucose.: 146 mg/dL (26 Sep 2022 18:28)  POCT Blood Glucose.: 98 mg/dL (26 Sep 2022 11:13)  POCT Blood Glucose.: 178 mg/dL (26 Sep 2022 06:22)    Meds: allopurinol 100 milliGRAM(s) Oral daily  atorvastatin 20 milliGRAM(s) Oral at bedtime  dextrose 50% Injectable 25 Gram(s) IV Push once  dextrose 50% Injectable 25 Gram(s) IV Push once  insulin lispro (ADMELOG) corrective regimen sliding scale   SubCutaneous every 6 hours        ACCESS DEVICES:  [ ] Peripheral IV  [ ] Central Venous Line	[ ] R	[ ] L	[ ] IJ	[ ] Fem	[ ] SC	Placed:   [ ] Arterial Line		[ ] R	[ ] L	[ ] Fem	[ ] Rad	[ ] Ax	Placed:   [ ] PICC:					[ ] Mediport  [ ] Urinary Catheter, Date Placed:   [x] Necessity of urinary, arterial, and venous catheters discussed    OTHER MEDICATIONS:  chlorhexidine 4% Liquid 1 Application(s) Topical daily      CODE STATUS:      IMAGING: SICU DAILY PROGRESS NOTE    24 HOUR EVENTS:   1 episode of BRBPR (800cc), remains hemodynamically stable, transfused 1u prbc for hgb 7.9.  - prep for colonoscopy tomorrow with hgb >8, K <5.   - 1x hydralazine 10mg IVP for sustained htn after exertion    SUBJECTIVE/ROS:  [x] A ten-point review of systems was otherwise negative except as noted.  [ ] Due to altered mental status/intubation, subjective information were not able to be obtained from the patient. History was obtained, to the extent possible, from review of the chart and collateral sources of information.      NEURO  RASS:     GCS:     CAM ICU:  Exam: awake, alert, oriented  Meds: acetaminophen     Tablet .. 650 milliGRAM(s) Oral every 6 hours PRN Temp greater or equal to 38C (100.4F), Mild Pain (1 - 3)  gabapentin 400 milliGRAM(s) Oral daily  melatonin 3 milliGRAM(s) Oral at bedtime PRN Insomnia  ondansetron Injectable 4 milliGRAM(s) IV Push every 8 hours PRN Nausea and/or Vomiting    [x] Adequacy of sedation and pain control has been assessed and adjusted      RESPIRATORY  RR: 17 (09-27-22 @ 01:00) (12 - 22)  SpO2: 100% (09-27-22 @ 01:00) (98% - 100%)  Wt(kg): --  Exam: unlabored, clear to auscultation bilaterally  Mechanical Ventilation:     [N/A] Extubation Readiness Assessed  Meds:       CARDIOVASCULAR  HR: 69 (09-27-22 @ 01:00) (58 - 83)  BP: 139/67 (09-27-22 @ 01:00) (123/81 - 169/73)  BP(mean): 88 (09-27-22 @ 01:00) (72 - 99)  ABP: --  ABP(mean): --  Wt(kg): --  CVP(cm H2O): --  VBG - ( 26 Sep 2022 01:41 )  pH: 7.25  /  pCO2: 36    /  pO2: 47    / HCO3: 16    / Base Excess: -10.6 /  SaO2: 79.7   Lactate: 2.3                Exam: regular rate and rhythm  Cardiac Rhythm: sinus  Perfusion     [x]Adequate   [ ]Inadequate  Mentation   [x]Normal       [ ]Reduced  Extremities  [x]Warm         [ ]Cool  Volume Status [ ]Hypervolemic [x]Euvolemic [ ]Hypovolemic  Meds: amLODIPine   Tablet 10 milliGRAM(s) Oral at bedtime  carvedilol 12.5 milliGRAM(s) Oral every 12 hours        GI/NUTRITION  Exam: soft, nontender, nondistended, incision C/D/I  Diet:  Meds: aluminum hydroxide/magnesium hydroxide/simethicone Suspension 30 milliLiter(s) Oral every 4 hours PRN Dyspepsia  bisacodyl 5 milliGRAM(s) Oral every 12 hours  pantoprazole  Injectable 40 milliGRAM(s) IV Push two times a day      GENITOURINARY  I&O's Detail    09-25 @ 07:01  -  09-26 @ 07:00  --------------------------------------------------------  IN:    Lactated Ringers: 75 mL  Total IN: 75 mL    OUT:    Blood Loss (mL): 4100 mL    Stool (mL): 4100 mL    Voided (mL): 0 mL  Total OUT: 8200 mL    Total NET: -8125 mL      09-26 @ 07:01  -  09-27 @ 01:30  --------------------------------------------------------  IN:    IV PiggyBack: 300 mL    Lactated Ringers: 225 mL    Lactated Ringers: 350 mL    Oral Fluid: 3100 mL  Total IN: 3975 mL    OUT:    Stool (mL): 2300 mL    Voided (mL): 250 mL  Total OUT: 2550 mL    Total NET: 1425 mL        Weight (kg): 85.8 (09-26 @ 04:45)  09-26    135  |  108<H>  |  70<H>  ----------------------------<  97  5.1   |  18<L>  |  4.44<H>    Ca    8.3<L>      26 Sep 2022 11:10  Phos  4.2     09-26  Mg     1.70     09-26    TPro  5.5<L>  /  Alb  3.1<L>  /  TBili  0.5  /  DBili  x   /  AST  11  /  ALT  <5<L>  /  AlkPhos  60  09-25    [ ] Dobbs catheter, indication: N/A  Meds:       HEMATOLOGIC  Meds:   [x] VTE Prophylaxis                        7.9    9.17  )-----------( 200      ( 26 Sep 2022 22:00 )             23.1     PT/INR - ( 25 Sep 2022 23:05 )   PT: 12.9 sec;   INR: 1.11 ratio         PTT - ( 25 Sep 2022 23:05 )  PTT:28.5 sec  Transfusion     [ ] PRBC   [ ] Platelets   [ ] FFP   [ ] Cryoprecipitate      INFECTIOUS DISEASES  WBC Count: 9.17 K/uL (09-26 @ 22:00)  WBC Count: 10.05 K/uL (09-26 @ 15:15)  WBC Count: 9.32 K/uL (09-26 @ 13:37)  WBC Count: 8.72 K/uL (09-26 @ 11:10)  WBC Count: 8.81 K/uL (09-26 @ 06:00)  WBC Count: 16.66 K/uL (09-26 @ 01:41)    RECENT CULTURES:    Meds:       ENDOCRINE  CAPILLARY BLOOD GLUCOSE      POCT Blood Glucose.: 155 mg/dL (26 Sep 2022 23:03)  POCT Blood Glucose.: 146 mg/dL (26 Sep 2022 18:28)  POCT Blood Glucose.: 98 mg/dL (26 Sep 2022 11:13)  POCT Blood Glucose.: 178 mg/dL (26 Sep 2022 06:22)    Meds: allopurinol 100 milliGRAM(s) Oral daily  atorvastatin 20 milliGRAM(s) Oral at bedtime  dextrose 50% Injectable 25 Gram(s) IV Push once  dextrose 50% Injectable 25 Gram(s) IV Push once  insulin lispro (ADMELOG) corrective regimen sliding scale   SubCutaneous every 6 hours        ACCESS DEVICES:  [ ] Peripheral IV  [ ] Central Venous Line	[ ] R	[ ] L	[ ] IJ	[ ] Fem	[ ] SC	Placed:   [ ] Arterial Line		[ ] R	[ ] L	[ ] Fem	[ ] Rad	[ ] Ax	Placed:   [ ] PICC:					[ ] Mediport  [ ] Urinary Catheter, Date Placed:   [x] Necessity of urinary, arterial, and venous catheters discussed    OTHER MEDICATIONS:  chlorhexidine 4% Liquid 1 Application(s) Topical daily      CODE STATUS:      IMAGING: SICU DAILY PROGRESS NOTE    24 HOUR EVENTS:   1 episode of BRBPR (800cc), remains hemodynamically stable, transfused 1u prbc for hgb 7.9.  - finished prep for colonoscopy today with hgb >8, K <5.   - 1x hydralazine 10mg IVP for sustained htn after exertion    SUBJECTIVE/ROS:  [x] A ten-point review of systems was otherwise negative except as noted.  [ ] Due to altered mental status/intubation, subjective information were not able to be obtained from the patient. History was obtained, to the extent possible, from review of the chart and collateral sources of information.      NEURO  RASS:     GCS:     CAM ICU:  Exam: awake, alert, oriented  Meds: acetaminophen     Tablet .. 650 milliGRAM(s) Oral every 6 hours PRN Temp greater or equal to 38C (100.4F), Mild Pain (1 - 3)  gabapentin 400 milliGRAM(s) Oral daily  melatonin 3 milliGRAM(s) Oral at bedtime PRN Insomnia  ondansetron Injectable 4 milliGRAM(s) IV Push every 8 hours PRN Nausea and/or Vomiting    [x] Adequacy of sedation and pain control has been assessed and adjusted      RESPIRATORY  RR: 17 (09-27-22 @ 01:00) (12 - 22)  SpO2: 100% (09-27-22 @ 01:00) (98% - 100%)  Wt(kg): --  Exam: unlabored, clear to auscultation bilaterally  Mechanical Ventilation:     [N/A] Extubation Readiness Assessed  Meds:       CARDIOVASCULAR  HR: 69 (09-27-22 @ 01:00) (58 - 83)  BP: 139/67 (09-27-22 @ 01:00) (123/81 - 169/73)  BP(mean): 88 (09-27-22 @ 01:00) (72 - 99)  ABP: --  ABP(mean): --  Wt(kg): --  CVP(cm H2O): --  VBG - ( 26 Sep 2022 01:41 )  pH: 7.25  /  pCO2: 36    /  pO2: 47    / HCO3: 16    / Base Excess: -10.6 /  SaO2: 79.7   Lactate: 2.3                Exam: regular rate and rhythm  Cardiac Rhythm: sinus  Perfusion     [x]Adequate   [ ]Inadequate  Mentation   [x]Normal       [ ]Reduced  Extremities  [x]Warm         [ ]Cool  Volume Status [ ]Hypervolemic [x]Euvolemic [ ]Hypovolemic  Meds: amLODIPine   Tablet 10 milliGRAM(s) Oral at bedtime  carvedilol 12.5 milliGRAM(s) Oral every 12 hours        GI/NUTRITION  Exam: soft, nontender, nondistended, incision C/D/I  Diet:  Meds: aluminum hydroxide/magnesium hydroxide/simethicone Suspension 30 milliLiter(s) Oral every 4 hours PRN Dyspepsia  bisacodyl 5 milliGRAM(s) Oral every 12 hours  pantoprazole  Injectable 40 milliGRAM(s) IV Push two times a day      GENITOURINARY  I&O's Detail    09-25 @ 07:01  -  09-26 @ 07:00  --------------------------------------------------------  IN:    Lactated Ringers: 75 mL  Total IN: 75 mL    OUT:    Blood Loss (mL): 4100 mL    Stool (mL): 4100 mL    Voided (mL): 0 mL  Total OUT: 8200 mL    Total NET: -8125 mL      09-26 @ 07:01  -  09-27 @ 01:30  --------------------------------------------------------  IN:    IV PiggyBack: 300 mL    Lactated Ringers: 225 mL    Lactated Ringers: 350 mL    Oral Fluid: 3100 mL  Total IN: 3975 mL    OUT:    Stool (mL): 2300 mL    Voided (mL): 250 mL  Total OUT: 2550 mL    Total NET: 1425 mL        Weight (kg): 85.8 (09-26 @ 04:45)  09-26    135  |  108<H>  |  70<H>  ----------------------------<  97  5.1   |  18<L>  |  4.44<H>    Ca    8.3<L>      26 Sep 2022 11:10  Phos  4.2     09-26  Mg     1.70     09-26    TPro  5.5<L>  /  Alb  3.1<L>  /  TBili  0.5  /  DBili  x   /  AST  11  /  ALT  <5<L>  /  AlkPhos  60  09-25    [ ] Dobbs catheter, indication: N/A  Meds:       HEMATOLOGIC  Meds:   [x] VTE Prophylaxis                        7.9    9.17  )-----------( 200      ( 26 Sep 2022 22:00 )             23.1     PT/INR - ( 25 Sep 2022 23:05 )   PT: 12.9 sec;   INR: 1.11 ratio         PTT - ( 25 Sep 2022 23:05 )  PTT:28.5 sec  Transfusion     [ ] PRBC   [ ] Platelets   [ ] FFP   [ ] Cryoprecipitate      INFECTIOUS DISEASES  WBC Count: 9.17 K/uL (09-26 @ 22:00)  WBC Count: 10.05 K/uL (09-26 @ 15:15)  WBC Count: 9.32 K/uL (09-26 @ 13:37)  WBC Count: 8.72 K/uL (09-26 @ 11:10)  WBC Count: 8.81 K/uL (09-26 @ 06:00)  WBC Count: 16.66 K/uL (09-26 @ 01:41)    RECENT CULTURES:    Meds:       ENDOCRINE  CAPILLARY BLOOD GLUCOSE      POCT Blood Glucose.: 155 mg/dL (26 Sep 2022 23:03)  POCT Blood Glucose.: 146 mg/dL (26 Sep 2022 18:28)  POCT Blood Glucose.: 98 mg/dL (26 Sep 2022 11:13)  POCT Blood Glucose.: 178 mg/dL (26 Sep 2022 06:22)    Meds: allopurinol 100 milliGRAM(s) Oral daily  atorvastatin 20 milliGRAM(s) Oral at bedtime  dextrose 50% Injectable 25 Gram(s) IV Push once  dextrose 50% Injectable 25 Gram(s) IV Push once  insulin lispro (ADMELOG) corrective regimen sliding scale   SubCutaneous every 6 hours        ACCESS DEVICES:  [ ] Peripheral IV  [ ] Central Venous Line	[ ] R	[ ] L	[ ] IJ	[ ] Fem	[ ] SC	Placed:   [ ] Arterial Line		[ ] R	[ ] L	[ ] Fem	[ ] Rad	[ ] Ax	Placed:   [ ] PICC:					[ ] Mediport  [ ] Urinary Catheter, Date Placed:   [x] Necessity of urinary, arterial, and venous catheters discussed    OTHER MEDICATIONS:  chlorhexidine 4% Liquid 1 Application(s) Topical daily      CODE STATUS:      IMAGING: SICU DAILY PROGRESS NOTE    24 HOUR EVENTS:  -Colonoscopy today  -Finished prep for colonoscopy today with hgb >8, K <5   -1x hydralazine 10mg IVP for sustained htn after exertion  -Restarted home Clonidine and Labetalol     SUBJECTIVE/ROS:  [x] A ten-point review of systems was otherwise negative except as noted.  [ ] Due to altered mental status/intubation, subjective information were not able to be obtained from the patient. History was obtained, to the extent possible, from review of the chart and collateral sources of information.      NEURO  RASS:     GCS:     CAM ICU:  Exam: awake, alert, oriented  Meds: acetaminophen     Tablet .. 650 milliGRAM(s) Oral every 6 hours PRN Temp greater or equal to 38C (100.4F), Mild Pain (1 - 3)  gabapentin 400 milliGRAM(s) Oral daily  melatonin 3 milliGRAM(s) Oral at bedtime PRN Insomnia  ondansetron Injectable 4 milliGRAM(s) IV Push every 8 hours PRN Nausea and/or Vomiting    [x] Adequacy of sedation and pain control has been assessed and adjusted      RESPIRATORY  RR: 17 (09-27-22 @ 01:00) (12 - 22)  SpO2: 100% (09-27-22 @ 01:00) (98% - 100%)  Wt(kg): --  Exam: unlabored, clear to auscultation bilaterally  Mechanical Ventilation:     [N/A] Extubation Readiness Assessed  Meds:       CARDIOVASCULAR  HR: 69 (09-27-22 @ 01:00) (58 - 83)  BP: 139/67 (09-27-22 @ 01:00) (123/81 - 169/73)  BP(mean): 88 (09-27-22 @ 01:00) (72 - 99)  ABP: --  ABP(mean): --  Wt(kg): --  CVP(cm H2O): --  VBG - ( 26 Sep 2022 01:41 )  pH: 7.25  /  pCO2: 36    /  pO2: 47    / HCO3: 16    / Base Excess: -10.6 /  SaO2: 79.7   Lactate: 2.3                Exam: regular rate and rhythm  Cardiac Rhythm: sinus  Perfusion     [x]Adequate   [ ]Inadequate  Mentation   [x]Normal       [ ]Reduced  Extremities  [x]Warm         [ ]Cool  Volume Status [ ]Hypervolemic [x]Euvolemic [ ]Hypovolemic  Meds: amLODIPine   Tablet 10 milliGRAM(s) Oral at bedtime  carvedilol 12.5 milliGRAM(s) Oral every 12 hours        GI/NUTRITION  Exam: soft, nontender, nondistended, incision C/D/I  Diet:  Meds: aluminum hydroxide/magnesium hydroxide/simethicone Suspension 30 milliLiter(s) Oral every 4 hours PRN Dyspepsia  bisacodyl 5 milliGRAM(s) Oral every 12 hours  pantoprazole  Injectable 40 milliGRAM(s) IV Push two times a day      GENITOURINARY  I&O's Detail    09-25 @ 07:01  -  09-26 @ 07:00  --------------------------------------------------------  IN:    Lactated Ringers: 75 mL  Total IN: 75 mL    OUT:    Blood Loss (mL): 4100 mL    Stool (mL): 4100 mL    Voided (mL): 0 mL  Total OUT: 8200 mL    Total NET: -8125 mL      09-26 @ 07:01  -  09-27 @ 01:30  --------------------------------------------------------  IN:    IV PiggyBack: 300 mL    Lactated Ringers: 225 mL    Lactated Ringers: 350 mL    Oral Fluid: 3100 mL  Total IN: 3975 mL    OUT:    Stool (mL): 2300 mL    Voided (mL): 250 mL  Total OUT: 2550 mL    Total NET: 1425 mL        Weight (kg): 85.8 (09-26 @ 04:45)  09-26    135  |  108<H>  |  70<H>  ----------------------------<  97  5.1   |  18<L>  |  4.44<H>    Ca    8.3<L>      26 Sep 2022 11:10  Phos  4.2     09-26  Mg     1.70     09-26    TPro  5.5<L>  /  Alb  3.1<L>  /  TBili  0.5  /  DBili  x   /  AST  11  /  ALT  <5<L>  /  AlkPhos  60  09-25    [ ] Dobbs catheter, indication: N/A  Meds:       HEMATOLOGIC  Meds:   [x] VTE Prophylaxis                        7.9    9.17  )-----------( 200      ( 26 Sep 2022 22:00 )             23.1     PT/INR - ( 25 Sep 2022 23:05 )   PT: 12.9 sec;   INR: 1.11 ratio         PTT - ( 25 Sep 2022 23:05 )  PTT:28.5 sec  Transfusion     [ ] PRBC   [ ] Platelets   [ ] FFP   [ ] Cryoprecipitate      INFECTIOUS DISEASES  WBC Count: 9.17 K/uL (09-26 @ 22:00)  WBC Count: 10.05 K/uL (09-26 @ 15:15)  WBC Count: 9.32 K/uL (09-26 @ 13:37)  WBC Count: 8.72 K/uL (09-26 @ 11:10)  WBC Count: 8.81 K/uL (09-26 @ 06:00)  WBC Count: 16.66 K/uL (09-26 @ 01:41)    RECENT CULTURES:    Meds:       ENDOCRINE  CAPILLARY BLOOD GLUCOSE      POCT Blood Glucose.: 155 mg/dL (26 Sep 2022 23:03)  POCT Blood Glucose.: 146 mg/dL (26 Sep 2022 18:28)  POCT Blood Glucose.: 98 mg/dL (26 Sep 2022 11:13)  POCT Blood Glucose.: 178 mg/dL (26 Sep 2022 06:22)    Meds: allopurinol 100 milliGRAM(s) Oral daily  atorvastatin 20 milliGRAM(s) Oral at bedtime  dextrose 50% Injectable 25 Gram(s) IV Push once  dextrose 50% Injectable 25 Gram(s) IV Push once  insulin lispro (ADMELOG) corrective regimen sliding scale   SubCutaneous every 6 hours        ACCESS DEVICES:  [ ] Peripheral IV  [ ] Central Venous Line	[ ] R	[ ] L	[ ] IJ	[ ] Fem	[ ] SC	Placed:   [ ] Arterial Line		[ ] R	[ ] L	[ ] Fem	[ ] Rad	[ ] Ax	Placed:   [ ] PICC:					[ ] Mediport  [ ] Urinary Catheter, Date Placed:   [x] Necessity of urinary, arterial, and venous catheters discussed    OTHER MEDICATIONS:  chlorhexidine 4% Liquid 1 Application(s) Topical daily      CODE STATUS:      IMAGING:

## 2022-09-27 NOTE — PROGRESS NOTE ADULT - SUBJECTIVE AND OBJECTIVE BOX
PATIENT SEEN AND EXAMINED ON :- 9/27/22  DATE OF SERVICE: 9/27/22             Interim events noted,Labs ,Radiological studies and Cardiology tests reviewed .       HOSPITAL COURSE: HPI:  76 yo M pmh CAD s/p 1stent ([facility] 12/2021), HLD, GERD, gout, DM, HTN, a-fib on plavix presents complaining of sudden onset BRBPR this morning. Patient has had 4 episodes total today, 2 at home and two while in the ED, states that every time the blood has filled the toilet bowl. Denies any prodromal chest pain, shortness of breath, palpitations or sensations of irregular heart rate,, abdominal pain, cramping, nausea, vomiting, diarrhea blood in urine or stool prior to episodes today. No recent NSAID use, history of GI bleeds, personal or family history of cancer. Last colonoscopy 3 years ago showed polyps that required clips.  (24 Sep 2022 17:53)      INTERIM EVENTS:Patient seen at bedside ,interim events noted.      PMH -reviewed admission note, no change since admission  HEART FAILURE: Acute[ ]Chronic[ ] Systolic[ ] Diastolic[ ] Combined Systolic and Diastolic[ ]  CAD[ ] CABG[ ] PCI[ ]  DEVICES[ ] PPM[ ] ICD[ ] ILR[ ]  ATRIAL FIBRILLATION[ ] Paroxysmal[ ] Permanent[ ] CHADS2-[  ]  RADHA[ ] CKD1[ ] CKD2[ ] CKD3[ ] CKD4[ ] ESRD[ ]  COPD[ ] HTN[ ]   DM[ ] Type1[ ] Type 2[ ]   CVA[ ] Paresis[ ]    AMBULATION: Assisted[ ] Cane/walker[ ] Independent[ ]    MEDICATIONS  (STANDING):  allopurinol 100 milliGRAM(s) Oral daily  amLODIPine   Tablet 10 milliGRAM(s) Oral at bedtime  atorvastatin 20 milliGRAM(s) Oral at bedtime  carvedilol 12.5 milliGRAM(s) Oral every 12 hours  chlorhexidine 4% Liquid 1 Application(s) Topical daily  cloNIDine Patch 0.3 mG/24Hr(s) 1 patch Transdermal every 7 days  gabapentin 400 milliGRAM(s) Oral daily  insulin lispro (ADMELOG) corrective regimen sliding scale   SubCutaneous Before meals and at bedtime  labetalol 200 milliGRAM(s) Oral every 8 hours  pantoprazole  Injectable 40 milliGRAM(s) IV Push two times a day    MEDICATIONS  (PRN):  aluminum hydroxide/magnesium hydroxide/simethicone Suspension 30 milliLiter(s) Oral every 4 hours PRN Dyspepsia  melatonin 3 milliGRAM(s) Oral at bedtime PRN Insomnia  ondansetron Injectable 4 milliGRAM(s) IV Push every 8 hours PRN Nausea and/or Vomiting            REVIEW OF SYSTEMS:  Constitutional: [ ] fever, [ ]weight loss,  [ ]fatigue [ ]weight gain  Eyes: [ ] visual changes  Respiratory: [ ]shortness of breath;  [ ] cough, [ ]wheezing, [ ]chills, [ ]hemoptysis  Cardiovascular: [ ] chest pain, [ ]palpitations, [ ]dizziness,  [ ]leg swelling[ ]orthopnea[ ]PND  Gastrointestinal: [ ] abdominal pain, [ ]nausea, [ ]vomiting,  [ ]diarrhea [ ]Constipation [ ]Melena  Genitourinary: [ ] dysuria, [ ] hematuria [ ]Dobbs  Neurologic: [ ] headaches [ ] tremors[ ]weakness [ ]Paralysis Right[ ] Left[ ]  Skin: [ ] itching, [ ]burning, [ ] rashes  Endocrine: [ ] heat or cold intolerance  Musculoskeletal: [ ] joint pain or swelling; [ ] muscle, back, or extremity pain  Psychiatric: [ ] depression, [ ]anxiety, [ ]mood swings, or [ ]difficulty sleeping  Hematologic: [ ] easy bruising, [ ] bleeding gums    [ ] All remaining systems negative except as per above.   [ ]Unable to obtain.  [x] No change in ROS since admission      Vital Signs Last 24 Hrs  T(C): 36.7 (27 Sep 2022 16:30), Max: 36.7 (27 Sep 2022 16:30)  T(F): 98 (27 Sep 2022 16:30), Max: 98 (27 Sep 2022 16:30)  HR: 77 (27 Sep 2022 17:30) (60 - 89)  BP: 153/68 (27 Sep 2022 17:30) (113/61 - 177/78)  BP(mean): 89 (27 Sep 2022 17:30) (72 - 105)  RR: 16 (27 Sep 2022 17:30) (14 - 23)  SpO2: 99% (27 Sep 2022 17:30) (98% - 100%)    Parameters below as of 27 Sep 2022 17:30  Patient On (Oxygen Delivery Method): room air      I&O's Summary    26 Sep 2022 07:01  -  27 Sep 2022 07:00  --------------------------------------------------------  IN: 4475 mL / OUT: 2750 mL / NET: 1725 mL    27 Sep 2022 07:01  -  27 Sep 2022 20:04  --------------------------------------------------------  IN: 1800 mL / OUT: 1700 mL / NET: 100 mL        PHYSICAL EXAM:  General: No acute distress BMI-  HEENT: EOMI, PERRL  Neck: Supple, [ ] JVD  Lungs: Equal air entry bilaterally; [ ] rales [ ] wheezing [ ] rhonchi  Heart: Regular rate and rhythm; [x ] murmur   2/6 [ x] systolic [ ] diastolic [ ] radiation[ ] rubs [ ]  gallops  Abdomen: Nontender, bowel sounds present  Extremities: No clubbing, cyanosis, [ ] edema [ ]Pulses  equal and intact  Nervous system:  Alert & Oriented X3, no focal deficits  Psychiatric: Normal affect  Skin: No rashes or lesions    LABS:  09-27    136  |  106  |  65<H>  ----------------------------<  114<H>  4.1   |  16<L>  |  4.44<H>    Ca    8.5      27 Sep 2022 01:06  Phos  3.1     09-27  Mg     1.70     09-27      Creatinine Trend: 4.44<--, 4.44<--, 4.38<--, 3.90<--, 3.88<--, 3.83<--                        9.2    8.13  )-----------( 202      ( 27 Sep 2022 06:12 )             27.0     PT/INR - ( 27 Sep 2022 01:06 )   PT: 12.8 sec;   INR: 1.10 ratio         PTT - ( 27 Sep 2022 01:06 )  PTT:23.2 sec

## 2022-09-27 NOTE — PROGRESS NOTE ADULT - ASSESSMENT
77y Male, SICU consulted for GI bleed. Patient s/p 3u PRBCs, 1u plts, 1u ffp. Medical team concerned that patient unable to be sufficiently transfused on floor. Hemoglobin is not stable. Will not list this patient for floor. Will undergo colonoscopy tomorrow.     PLAN:   Neurologic:   - Patient states is feeling well w/o pain.    Respiratory:  - Continue to monitor     Cardiovascular:   - Patient currently normotensive to hypertensive.  - Continue home medications  - Given 1x hydralazine 10 mg IVP for sustained hypertension after exertion    Gastrointestinal/Nutrition:   - Continue to monitor for active bleeding  - Will undergo colonoscopy tomorrow    Hematologic:   - S/p 3u PRBcs, 1u plt, 1u ffp  - f/u TEG, as patient on aspirin/plavix at home  - transfuse as needed    Endocrine:   - ISS  - Will repeat BMP and monitor K+, if high will shift K+ with insulin    DISPO: SICU   77y Male, SICU consulted for GI bleed. Patient s/p 3u PRBCs, 1u plts, 1u ffp. Medical team concerned that patient unable to be sufficiently transfused on floor. Hemoglobin is not stable. Will not list this patient for floor. Will undergo colonoscopy tomorrow.     PLAN:   Neurologic:   - Patient states is feeling well w/o pain.  - tylenol prn, gabapentin daily    Respiratory:  - Continue to monitor     Cardiovascular:   - Patient currently normotensive to hypertensive.  - Continue home medications. Restart home labetalol  - Given 1x hydralazine 10 mg IVP for sustained hypertension after exertion    Gastrointestinal/Nutrition:   - Continue to monitor for active bleeding  - Will undergo colonoscopy today. NPO since midnight.    /Renal  - LR @ 75ml/hr  - Strict IO&O's    Hematologic:   - S/p 4u PRBcs, 1u plt, 1u ffp  - f/u TEG, as patient on aspirin/plavix at home  - transfuse as needed    Endocrine:   - ISS    DISPO: SICU. List to floor after colonoscopy if stable.   77y Male, SICU consulted for GI bleed. Patient s/p 3u PRBCs, 1u plts, 1u ffp. Medical team concerned that patient unable to be sufficiently transfused on floor. Hemoglobin is not stable. Will not list this patient for floor. Will undergo colonoscopy tomorrow.     PLAN:   Neurologic:   - Patient states is feeling well w/o pain.  - tylenol prn, gabapentin daily    Respiratory:  - Continue to monitor  - 02 sat >96% on RA     Cardiovascular:   - Patient currently normotensive to hypertensive.  - Continue home medications. Restart home labetalol  - Given 1x hydralazine 10 mg IVP for sustained hypertension after exertion    Gastrointestinal/Nutrition:   - Continue to monitor for active bleeding  - Will undergo colonoscopy today. NPO since midnight.    /Renal  - LR @ 75ml/hr  - Strict IO&O's    Hematologic:   - S/p 4u PRBcs, 1u plt, 1u ffp  - f/u TEG, as patient on aspirin/plavix at home  - transfuse as needed    Endocrine:   - ISS    DISPO: SICU. List to floor after colonoscopy if stable.   77y Male, SICU consulted for GI bleed. Patient s/p 3u PRBCs, 1u plts, 1u ffp. Medical team concerned that patient unable to be sufficiently transfused on floor. Hemoglobin is not stable. Will not list this patient for floor. Will undergo colonoscopy tomorrow.     PLAN:   Neurologic:   - Patient states is feeling well w/o pain.  - tylenol prn, gabapentin daily    Respiratory:  - Continue to monitor  - 02 sat >96% on RA     Cardiovascular:   - Patient currently normotensive to hypertensive.  - Continue home medications. Restart home labetalol, clonidine  - Given 1x hydralazine 10 mg IVP for sustained hypertension after exertion    Gastrointestinal/Nutrition:   - Continue to monitor for active bleeding  - Will undergo colonoscopy today. NPO since midnight.    /Renal  - LR @ 75ml/hr  - Strict IO&O's    Hematologic:   - S/p 4u PRBcs, 1u plt, 1u ffp  - F/u TEG, as patient on aspirin/plavix at home  - Transfuse as needed    Endocrine:   - ISS    DISPO: SICU. List to floor after colonoscopy if stable.

## 2022-09-27 NOTE — PROGRESS NOTE ADULT - SUBJECTIVE AND OBJECTIVE BOX
TEAM Surgery Progress Note  Patient is a 77y old  Male who presents with a chief complaint of Gastrointestinal hemorrhage  77y Male, SICU consulted for GI bleed. Patient s/p 3u PRBCs, 1u plts, 1u ffp. Medical team concerned that patient unable to be sufficiently transfused on floor.   Patient initially admitted to medicine. Surgery consulted for GI bleed, concern for diverticular bleed. GI also consulted, planning for colonoscopy when optimized by cardiology. IR consulted, most recent CT without active extravasation. Earlier in the night patient went to bathroom and became diaphoretic, light headed after multiple (reported 8-10) bloody bm. Transfusion of prbc started, RRT called to assist with assessment/transfusion. Received 3prbc, 1ffp and 1plateles as well.   (26 Sep 2022 11:43)      INTERVAL EVENTS: Patient is POD# ***No acute events overnight.  SUBJECTIVE: Patient seen and examined at bedside with surgical team, patient without complaints. Denies fever, chills, CP, SOB nausea, vomiting, abdominal pain.    REVIEW OF SYSTEMS:  Constitutional: No fevers or chills. No malaise or weakness.  EENT: No vision changes. No ear pain. No nasal congestion or rhinitis. No throat pain or dysphagia.  Respiratory: No cough, wheezing, or SOB. No hemoptysis.  Cardiovascular: No chest pain or palpitations.  Gastrointestinal: No abdominal pain. No nausea, vomiting, diarrhea or constipation. No hematochezia. No melena.  Genitourinary: No dysuria, hematuria, or frequency.  Neurologic: No numbness or tingling. No weakness.  Skin: No rashes or pruritus.     OBJECTIVE:    Vital Signs Last 24 Hrs  T(C): 36.8 (26 Sep 2022 20:00), Max: 36.8 (26 Sep 2022 20:00)  T(F): 98.2 (26 Sep 2022 20:00), Max: 98.2 (26 Sep 2022 20:00)  HR: 77 (27 Sep 2022 00:00) (58 - 83)  BP: 167/74 (27 Sep 2022 00:00) (123/81 - 169/73)  BP(mean): 99 (27 Sep 2022 00:00) (72 - 99)  RR: 20 (27 Sep 2022 00:00) (12 - 22)  SpO2: 100% (27 Sep 2022 00:00) (98% - 100%)    Parameters below as of 27 Sep 2022 00:00  Patient On (Oxygen Delivery Method): room air    I&O's Detail    25 Sep 2022 07:01  -  26 Sep 2022 07:00  --------------------------------------------------------  IN:    Lactated Ringers: 75 mL  Total IN: 75 mL    OUT:    Blood Loss (mL): 4100 mL    Stool (mL): 4100 mL    Voided (mL): 0 mL  Total OUT: 8200 mL    Total NET: -8125 mL      26 Sep 2022 07:01  -  27 Sep 2022 01:10  --------------------------------------------------------  IN:    IV PiggyBack: 300 mL    Lactated Ringers: 225 mL    Lactated Ringers: 350 mL    Oral Fluid: 3100 mL  Total IN: 3975 mL    OUT:    Stool (mL): 2300 mL    Voided (mL): 250 mL  Total OUT: 2550 mL    Total NET: 1425 mL      MEDICATIONS  (STANDING):  allopurinol 100 milliGRAM(s) Oral daily  amLODIPine   Tablet 10 milliGRAM(s) Oral at bedtime  atorvastatin 20 milliGRAM(s) Oral at bedtime  bisacodyl 5 milliGRAM(s) Oral every 12 hours  carvedilol 12.5 milliGRAM(s) Oral every 12 hours  chlorhexidine 4% Liquid 1 Application(s) Topical daily  dextrose 50% Injectable 25 Gram(s) IV Push once  dextrose 50% Injectable 25 Gram(s) IV Push once  gabapentin 400 milliGRAM(s) Oral daily  insulin lispro (ADMELOG) corrective regimen sliding scale   SubCutaneous every 6 hours  pantoprazole  Injectable 40 milliGRAM(s) IV Push two times a day    MEDICATIONS  (PRN):  acetaminophen     Tablet .. 650 milliGRAM(s) Oral every 6 hours PRN Temp greater or equal to 38C (100.4F), Mild Pain (1 - 3)  aluminum hydroxide/magnesium hydroxide/simethicone Suspension 30 milliLiter(s) Oral every 4 hours PRN Dyspepsia  melatonin 3 milliGRAM(s) Oral at bedtime PRN Insomnia  ondansetron Injectable 4 milliGRAM(s) IV Push every 8 hours PRN Nausea and/or Vomiting      PHYSICAL EXAM:  Constitutional: A&Ox3, NAD  Respiratory: Unlabored breathing  Abdomen: Soft, nondistended, NTTP. No rebound or guarding.  Extremities: WWP, ODEN spontaneously    LABS:                        7.9    9.17  )-----------( 200      ( 26 Sep 2022 22:00 )             23.1     09-26    135  |  108<H>  |  70<H>  ----------------------------<  97  5.1   |  18<L>  |  4.44<H>    Ca    8.3<L>      26 Sep 2022 11:10  Phos  4.2     09-26  Mg     1.70     09-26    TPro  5.5<L>  /  Alb  3.1<L>  /  TBili  0.5  /  DBili  x   /  AST  11  /  ALT  <5<L>  /  AlkPhos  60  09-25    PT/INR - ( 25 Sep 2022 23:05 )   PT: 12.9 sec;   INR: 1.11 ratio         PTT - ( 25 Sep 2022 23:05 )  PTT:28.5 sec  LIVER FUNCTIONS - ( 25 Sep 2022 04:20 )  Alb: 3.1 g/dL / Pro: 5.5 g/dL / ALK PHOS: 60 U/L / ALT: <5 U/L / AST: 11 U/L / GGT: x                 IMAGING:     B TEAM Surgery Progress Note    No acute events overnight.  SUBJECTIVE: Patient seen and examined at bedside with surgical team, patient without complaints. Denies fever, chills, CP, SOB nausea, vomiting, abdominal pain.    REVIEW OF SYSTEMS:  Constitutional: No fevers or chills. No malaise or weakness.  EENT: No vision changes. No ear pain. No nasal congestion or rhinitis. No throat pain or dysphagia.  Respiratory: No cough, wheezing, or SOB. No hemoptysis.  Cardiovascular: No chest pain or palpitations.  Gastrointestinal: No abdominal pain. No nausea, vomiting, diarrhea or constipation. No hematochezia. No melena.  Genitourinary: No dysuria, hematuria, or frequency.  Neurologic: No numbness or tingling. No weakness.  Skin: No rashes or pruritus.     OBJECTIVE:    Vital Signs Last 24 Hrs  T(C): 36.8 (26 Sep 2022 20:00), Max: 36.8 (26 Sep 2022 20:00)  T(F): 98.2 (26 Sep 2022 20:00), Max: 98.2 (26 Sep 2022 20:00)  HR: 77 (27 Sep 2022 00:00) (58 - 83)  BP: 167/74 (27 Sep 2022 00:00) (123/81 - 169/73)  BP(mean): 99 (27 Sep 2022 00:00) (72 - 99)  RR: 20 (27 Sep 2022 00:00) (12 - 22)  SpO2: 100% (27 Sep 2022 00:00) (98% - 100%)    Parameters below as of 27 Sep 2022 00:00  Patient On (Oxygen Delivery Method): room air    I&O's Detail    25 Sep 2022 07:01  -  26 Sep 2022 07:00  --------------------------------------------------------  IN:    Lactated Ringers: 75 mL  Total IN: 75 mL    OUT:    Blood Loss (mL): 4100 mL    Stool (mL): 4100 mL    Voided (mL): 0 mL  Total OUT: 8200 mL    Total NET: -8125 mL      26 Sep 2022 07:01  -  27 Sep 2022 01:10  --------------------------------------------------------  IN:    IV PiggyBack: 300 mL    Lactated Ringers: 225 mL    Lactated Ringers: 350 mL    Oral Fluid: 3100 mL  Total IN: 3975 mL    OUT:    Stool (mL): 2300 mL    Voided (mL): 250 mL  Total OUT: 2550 mL    Total NET: 1425 mL      MEDICATIONS  (STANDING):  allopurinol 100 milliGRAM(s) Oral daily  amLODIPine   Tablet 10 milliGRAM(s) Oral at bedtime  atorvastatin 20 milliGRAM(s) Oral at bedtime  bisacodyl 5 milliGRAM(s) Oral every 12 hours  carvedilol 12.5 milliGRAM(s) Oral every 12 hours  chlorhexidine 4% Liquid 1 Application(s) Topical daily  dextrose 50% Injectable 25 Gram(s) IV Push once  dextrose 50% Injectable 25 Gram(s) IV Push once  gabapentin 400 milliGRAM(s) Oral daily  insulin lispro (ADMELOG) corrective regimen sliding scale   SubCutaneous every 6 hours  pantoprazole  Injectable 40 milliGRAM(s) IV Push two times a day    MEDICATIONS  (PRN):  acetaminophen     Tablet .. 650 milliGRAM(s) Oral every 6 hours PRN Temp greater or equal to 38C (100.4F), Mild Pain (1 - 3)  aluminum hydroxide/magnesium hydroxide/simethicone Suspension 30 milliLiter(s) Oral every 4 hours PRN Dyspepsia  melatonin 3 milliGRAM(s) Oral at bedtime PRN Insomnia  ondansetron Injectable 4 milliGRAM(s) IV Push every 8 hours PRN Nausea and/or Vomiting      PHYSICAL EXAM:  Constitutional: A&Ox3, NAD  Respiratory: Unlabored breathing  Abdomen: Soft, nondistended, NTTP. No rebound or guarding.  Extremities: WWP, ODEN spontaneously    LABS:                        7.9    9.17  )-----------( 200      ( 26 Sep 2022 22:00 )             23.1     09-26    135  |  108<H>  |  70<H>  ----------------------------<  97  5.1   |  18<L>  |  4.44<H>    Ca    8.3<L>      26 Sep 2022 11:10  Phos  4.2     09-26  Mg     1.70     09-26    TPro  5.5<L>  /  Alb  3.1<L>  /  TBili  0.5  /  DBili  x   /  AST  11  /  ALT  <5<L>  /  AlkPhos  60  09-25    PT/INR - ( 25 Sep 2022 23:05 )   PT: 12.9 sec;   INR: 1.11 ratio         PTT - ( 25 Sep 2022 23:05 )  PTT:28.5 sec  LIVER FUNCTIONS - ( 25 Sep 2022 04:20 )  Alb: 3.1 g/dL / Pro: 5.5 g/dL / ALK PHOS: 60 U/L / ALT: <5 U/L / AST: 11 U/L / GGT: x                 IMAGING:

## 2022-09-28 LAB
ANION GAP SERPL CALC-SCNC: 18 MMOL/L — HIGH (ref 7–14)
APTT BLD: 28.2 SEC — SIGNIFICANT CHANGE UP (ref 27–36.3)
BUN SERPL-MCNC: 57 MG/DL — HIGH (ref 7–23)
CALCIUM SERPL-MCNC: 8.7 MG/DL — SIGNIFICANT CHANGE UP (ref 8.4–10.5)
CHLORIDE SERPL-SCNC: 112 MMOL/L — HIGH (ref 98–107)
CK MB BLD-MCNC: 3.5 % — HIGH (ref 0–2.5)
CK MB CFR SERPL CALC: 6 NG/ML — SIGNIFICANT CHANGE UP
CK SERPL-CCNC: 170 U/L — SIGNIFICANT CHANGE UP (ref 30–200)
CO2 SERPL-SCNC: 14 MMOL/L — LOW (ref 22–31)
CREAT SERPL-MCNC: 4.26 MG/DL — HIGH (ref 0.5–1.3)
EGFR: 14 ML/MIN/1.73M2 — LOW
GLUCOSE BLDC GLUCOMTR-MCNC: 148 MG/DL — HIGH (ref 70–99)
GLUCOSE BLDC GLUCOMTR-MCNC: 160 MG/DL — HIGH (ref 70–99)
GLUCOSE BLDC GLUCOMTR-MCNC: 176 MG/DL — HIGH (ref 70–99)
GLUCOSE BLDC GLUCOMTR-MCNC: 187 MG/DL — HIGH (ref 70–99)
GLUCOSE BLDC GLUCOMTR-MCNC: 199 MG/DL — HIGH (ref 70–99)
GLUCOSE BLDC GLUCOMTR-MCNC: 202 MG/DL — HIGH (ref 70–99)
GLUCOSE BLDC GLUCOMTR-MCNC: 214 MG/DL — HIGH (ref 70–99)
GLUCOSE SERPL-MCNC: 148 MG/DL — HIGH (ref 70–99)
HCT VFR BLD CALC: 24.7 % — LOW (ref 39–50)
HGB BLD-MCNC: 8.2 G/DL — LOW (ref 13–17)
INR BLD: 0.94 RATIO — SIGNIFICANT CHANGE UP (ref 0.88–1.16)
MAGNESIUM SERPL-MCNC: 2.1 MG/DL — SIGNIFICANT CHANGE UP (ref 1.6–2.6)
MCHC RBC-ENTMCNC: 28.6 PG — SIGNIFICANT CHANGE UP (ref 27–34)
MCHC RBC-ENTMCNC: 33.2 GM/DL — SIGNIFICANT CHANGE UP (ref 32–36)
MCV RBC AUTO: 86.1 FL — SIGNIFICANT CHANGE UP (ref 80–100)
NRBC # BLD: 0 /100 WBCS — SIGNIFICANT CHANGE UP (ref 0–0)
NRBC # FLD: 0 K/UL — SIGNIFICANT CHANGE UP (ref 0–0)
PHOSPHATE SERPL-MCNC: 3.9 MG/DL — SIGNIFICANT CHANGE UP (ref 2.5–4.5)
PLATELET # BLD AUTO: 190 K/UL — SIGNIFICANT CHANGE UP (ref 150–400)
POTASSIUM SERPL-MCNC: 4.1 MMOL/L — SIGNIFICANT CHANGE UP (ref 3.5–5.3)
POTASSIUM SERPL-SCNC: 4.1 MMOL/L — SIGNIFICANT CHANGE UP (ref 3.5–5.3)
PROTHROM AB SERPL-ACNC: 10.9 SEC — SIGNIFICANT CHANGE UP (ref 10.5–13.4)
RBC # BLD: 2.87 M/UL — LOW (ref 4.2–5.8)
RBC # FLD: 14.7 % — HIGH (ref 10.3–14.5)
SODIUM SERPL-SCNC: 144 MMOL/L — SIGNIFICANT CHANGE UP (ref 135–145)
TROPONIN T, HIGH SENSITIVITY RESULT: 66 NG/L — CRITICAL HIGH
WBC # BLD: 6.11 K/UL — SIGNIFICANT CHANGE UP (ref 3.8–10.5)
WBC # FLD AUTO: 6.11 K/UL — SIGNIFICANT CHANGE UP (ref 3.8–10.5)

## 2022-09-28 PROCEDURE — 93306 TTE W/DOPPLER COMPLETE: CPT | Mod: 26

## 2022-09-28 PROCEDURE — 99223 1ST HOSP IP/OBS HIGH 75: CPT

## 2022-09-28 PROCEDURE — 70498 CT ANGIOGRAPHY NECK: CPT | Mod: 26

## 2022-09-28 PROCEDURE — 99232 SBSQ HOSP IP/OBS MODERATE 35: CPT | Mod: GC

## 2022-09-28 PROCEDURE — 70496 CT ANGIOGRAPHY HEAD: CPT | Mod: 26

## 2022-09-28 PROCEDURE — 99231 SBSQ HOSP IP/OBS SF/LOW 25: CPT | Mod: 25

## 2022-09-28 RX ORDER — DEXTROSE 50 % IN WATER 50 %
50 SYRINGE (ML) INTRAVENOUS ONCE
Refills: 0 | Status: DISCONTINUED | OUTPATIENT
Start: 2022-09-28 | End: 2022-09-28

## 2022-09-28 RX ORDER — ATORVASTATIN CALCIUM 80 MG/1
80 TABLET, FILM COATED ORAL AT BEDTIME
Refills: 0 | Status: DISCONTINUED | OUTPATIENT
Start: 2022-09-28 | End: 2022-09-29

## 2022-09-28 RX ORDER — ASPIRIN/CALCIUM CARB/MAGNESIUM 324 MG
81 TABLET ORAL DAILY
Refills: 0 | Status: DISCONTINUED | OUTPATIENT
Start: 2022-09-28 | End: 2022-09-30

## 2022-09-28 RX ORDER — HEPARIN SODIUM 5000 [USP'U]/ML
5000 INJECTION INTRAVENOUS; SUBCUTANEOUS EVERY 8 HOURS
Refills: 0 | Status: DISCONTINUED | OUTPATIENT
Start: 2022-09-28 | End: 2022-09-30

## 2022-09-28 RX ORDER — SODIUM CHLORIDE 9 MG/ML
1000 INJECTION INTRAMUSCULAR; INTRAVENOUS; SUBCUTANEOUS ONCE
Refills: 0 | Status: COMPLETED | OUTPATIENT
Start: 2022-09-28 | End: 2022-09-28

## 2022-09-28 RX ADMIN — PANTOPRAZOLE SODIUM 40 MILLIGRAM(S): 20 TABLET, DELAYED RELEASE ORAL at 17:07

## 2022-09-28 RX ADMIN — CARVEDILOL PHOSPHATE 12.5 MILLIGRAM(S): 80 CAPSULE, EXTENDED RELEASE ORAL at 17:06

## 2022-09-28 RX ADMIN — PANTOPRAZOLE SODIUM 40 MILLIGRAM(S): 20 TABLET, DELAYED RELEASE ORAL at 05:36

## 2022-09-28 RX ADMIN — SODIUM CHLORIDE 500 MILLILITER(S): 9 INJECTION INTRAMUSCULAR; INTRAVENOUS; SUBCUTANEOUS at 10:00

## 2022-09-28 RX ADMIN — Medication 1 PATCH: at 21:18

## 2022-09-28 RX ADMIN — Medication 200 MILLIGRAM(S): at 22:30

## 2022-09-28 RX ADMIN — Medication 4: at 22:41

## 2022-09-28 RX ADMIN — GABAPENTIN 400 MILLIGRAM(S): 400 CAPSULE ORAL at 13:10

## 2022-09-28 RX ADMIN — Medication 1 PATCH: at 06:45

## 2022-09-28 RX ADMIN — Medication 81 MILLIGRAM(S): at 13:14

## 2022-09-28 RX ADMIN — ATORVASTATIN CALCIUM 20 MILLIGRAM(S): 80 TABLET, FILM COATED ORAL at 22:31

## 2022-09-28 RX ADMIN — Medication 200 MILLIGRAM(S): at 13:11

## 2022-09-28 RX ADMIN — AMLODIPINE BESYLATE 10 MILLIGRAM(S): 2.5 TABLET ORAL at 22:31

## 2022-09-28 RX ADMIN — Medication 100 MILLIGRAM(S): at 13:10

## 2022-09-28 RX ADMIN — Medication 4: at 18:15

## 2022-09-28 RX ADMIN — HEPARIN SODIUM 5000 UNIT(S): 5000 INJECTION INTRAVENOUS; SUBCUTANEOUS at 22:31

## 2022-09-28 RX ADMIN — Medication 200 MILLIGRAM(S): at 01:01

## 2022-09-28 RX ADMIN — CARVEDILOL PHOSPHATE 12.5 MILLIGRAM(S): 80 CAPSULE, EXTENDED RELEASE ORAL at 05:36

## 2022-09-28 RX ADMIN — HEPARIN SODIUM 5000 UNIT(S): 5000 INJECTION INTRAVENOUS; SUBCUTANEOUS at 13:11

## 2022-09-28 NOTE — PHYSICAL THERAPY INITIAL EVALUATION ADULT - ADDITIONAL COMMENTS
Patient lives with wife in apartment, no steps to negotiate + elevator access. Patient was independent in all ADL prior to admission. Patient was not using an assistive device prior to admission.

## 2022-09-28 NOTE — PROGRESS NOTE ADULT - ASSESSMENT
77y Male, SICU consulted for GI bleed. Patient s/p 3u PRBCs, 1u plts, 1u ffp. Medical team concerned that patient unable to be sufficiently transfused on floor. Hemoglobin is not stable. Will not list this patient for floor. Will undergo colonoscopy tomorrow.     PLAN:   Neurologic:   - Patient states is feeling well w/o pain.  - tylenol prn, gabapentin daily    Respiratory:  - Continue to monitor  - 02 sat >96% on RA     Cardiovascular:   - Patient currently normotensive to hypertensive.  - Continue labetalol, clonidine, amlodipine and coreg    Gastrointestinal/Nutrition:   - Continue to monitor for active bleeding  - Will undergo colonoscopy today. NPO since midnight.    /Renal  - LR @ 75ml/hr  - Strict IO&O's    Hematologic:   - S/p 4u PRBcs, 1u plt, 1u ffp  - F/u TEG, as patient on aspirin/plavix at home  - Transfuse as needed    Endocrine:   - ISS

## 2022-09-28 NOTE — PHYSICAL THERAPY INITIAL EVALUATION ADULT - PERTINENT HX OF CURRENT PROBLEM, REHAB EVAL
76 yo M pmh CAD s/p 1stent ([facility] 12/2021), HLD, GERD, gout, DM, HTN, a-fib on plavix presents complaining of sudden onset BRBPR this morning. Patient has had 4 episodes total today, 2 at home and two while in the ED, states that every time the blood has filled the toilet bowl. Code stroke called this morning for new onset slurred speech and weakness on the R side (arm/leg). LKW 1:00 am on 9/28 right before patient went to bed. Patient reports waking up at 4:00 am 9/28 with symptoms at this time. Denies this ever happening before.

## 2022-09-28 NOTE — PROGRESS NOTE ADULT - ASSESSMENT
78 yo M pmh CAD s/p 1 stent in 2021 (on plavix- last dose 9/22), HLD, GERD, gout, DM, HTN, Afib (?unclear AC) presents complaining of sudden onset BRBPR x1 day. Patient has had 4 episodes total in last 24-48 h, 2 at home and two while in the ED. Has never had rectal bleeding like this in the past. Reports 20 lb unintentional weight loss over the last few months due to poor appetite. Last colonoscopy 3 years ago with polyps s/p endoclips. Has never had an EGD before. In the ED, VSS, Hgb 10 -> 8 (BL 10-12), K 6.1, elevated trop. CT A/P showing diffuse mucosal hyperemia of the large bowel and rectum, no intraluminal extravasation of contrast to suggest a GI bleed, no bowel obstruction, colonic diverticulosis without acute diverticulitis. GI consulted for c/f LGIB.    #LGIB  Suspect most likely 2/2 diverticular bleed now s/p colonoscopy 9/27 with pan diverticulosis with hemorrhoids  #CAD s/p 1 stent in 2021 (on plavix- last dose 9/22)  #Afib (?unknown if on AC)  #hyperK- K 6.1   #cvA    Recommendations:  -No additional intervention indicated at this time  -Okay to resume plavix  -PPI 40 iV BID, transition to po after 72 hrs, unlikely UGIB. Plan to maintain on ppi daily while on ASA  -Neurology following    Preliminary note until signed by Attending.    Thank you for involving us in this patient's care.    Michelle Woods MD  Gastroenterology/Hepatology Fellow, PGY-VI    NON-URGENT CONSULTS:  Please email [email or web address]  AT NIGHT AND ON WEEKENDS:  Contact on-call GI fellow via answering service ([phone]) from 5pm-8am and on weekends/holidays  MONDAY-FRIDAY 8AM-5PM:  Pager# [phone] ([facility])  GI Phone# [phone] ([facility])

## 2022-09-28 NOTE — CHART NOTE - NSCHARTNOTEFT_GEN_A_CORE
SICU Transfer Note    Transfer from: SICU  Transfer to: floor  Accepting randell: Agata      SICU COURSE:  Transferred for increased resusitation/transfusion needs s/p 3u pRBCs, 1 plt, 1 ffp  - Patient remained hemodynamically stable through course  - received 1 additional unit pRBC  - colonoscopy with GI showed only diverticulosis          ASSESSMENT & PLAN: 78 yo M pmh CAD s/p 1stent ([facility] 12/2021), HLD, GERD, gout, DM, HTN, ?a-fib (patient denies), on plavix (last dose 9/22, also on aspirin) presents complaining of sudden onset BRBPR. Surgery consulted for evaluation of lower GI bleed. Transferred to SICU for increased transfusion/resuscitation needs. Underwent colonoscopy which showed diverticulosis. Now trasnferred to floor hemodynmically stable no further bleeding episodes.    Plan:  - f/u AM labs  - CLD  - strict I/Os  - home medications  - transfuse as needed if rebleed        Vital Signs Last 24 Hrs  T(C): 36.8 (28 Sep 2022 01:04), Max: 37.2 (28 Sep 2022 00:00)  T(F): 98.3 (28 Sep 2022 01:04), Max: 98.9 (28 Sep 2022 00:00)  HR: 87 (28 Sep 2022 01:04) (60 - 89)  BP: 179/76 (28 Sep 2022 01:04) (113/61 - 179/76)  BP(mean): 76 (28 Sep 2022 00:00) (76 - 105)  RR: 18 (28 Sep 2022 01:04) (14 - 23)  SpO2: 99% (28 Sep 2022 01:04) (98% - 100%)    Parameters below as of 28 Sep 2022 01:04  Patient On (Oxygen Delivery Method): room air      I&O's Summary    26 Sep 2022 07:01  -  27 Sep 2022 07:00  --------------------------------------------------------  IN: 4475 mL / OUT: 2750 mL / NET: 1725 mL    27 Sep 2022 07:01  -  28 Sep 2022 01:55  --------------------------------------------------------  IN: 2240 mL / OUT: 2240 mL / NET: 0 mL    Exam:  Gen: NAD  Resp: good effort, no increased WOB  Abd: soft, nontender, nondistended,           MEDICATIONS  (STANDING):  allopurinol 100 milliGRAM(s) Oral daily  amLODIPine   Tablet 10 milliGRAM(s) Oral at bedtime  atorvastatin 20 milliGRAM(s) Oral at bedtime  carvedilol 12.5 milliGRAM(s) Oral every 12 hours  chlorhexidine 4% Liquid 1 Application(s) Topical daily  cloNIDine Patch 0.3 mG/24Hr(s) 1 patch Transdermal every 7 days  gabapentin 400 milliGRAM(s) Oral daily  insulin lispro (ADMELOG) corrective regimen sliding scale   SubCutaneous Before meals and at bedtime  labetalol 200 milliGRAM(s) Oral every 8 hours  pantoprazole  Injectable 40 milliGRAM(s) IV Push two times a day    MEDICATIONS  (PRN):  aluminum hydroxide/magnesium hydroxide/simethicone Suspension 30 milliLiter(s) Oral every 4 hours PRN Dyspepsia  melatonin 3 milliGRAM(s) Oral at bedtime PRN Insomnia  ondansetron Injectable 4 milliGRAM(s) IV Push every 8 hours PRN Nausea and/or Vomiting        LABS                                            9.2                   Neurophils% (auto):   x      (09-27 @ 06:12):    8.13 )-----------(202          Lymphocytes% (auto):  x                                             27.0                   Eosinphils% (auto):   x        Manual%: Neutrophils x    ; Lymphocytes x    ; Eosinophils x    ; Bands%: x    ; Blasts x

## 2022-09-28 NOTE — OCCUPATIONAL THERAPY INITIAL EVALUATION ADULT - PERTINENT HX OF CURRENT PROBLEM, REHAB EVAL
77 year old male with history of CAD s/p 1stent, HLD, GERD, gout, DM, HTN, a-fib presents complaining of sudden onset BRBPR this morning. Anticipated to have lower GI bleed s/p colonoscopy revealing pandiverticulosis and one polyp without source of active bleeding. S/p multiple PRBCs. Code stroke called in AM on 9/28 for new onset slurred speech and weakness on the R side (arm/leg).

## 2022-09-28 NOTE — PHYSICAL THERAPY INITIAL EVALUATION ADULT - PATIENT/FAMILY AGREES WITH PLAN
----- Message from Monisha Montes sent at 12/3/2020  2:06 PM CST -----  Regarding: medication  Patient wants to talk to the nurse about a medication that was supposed to be send over to the pharmacy and was not for nausea please resend the medication and call the patient to let him know thank you      yes

## 2022-09-28 NOTE — PROGRESS NOTE ADULT - SUBJECTIVE AND OBJECTIVE BOX
PATIENT SEEN AND EXAMINED ON :- 9/28/22  DATE OF SERVICE:   9/28/22          Interim events noted,Labs ,Radiological studies and Cardiology tests reviewed .       HOSPITAL COURSE: HPI:  78 yo M pmh CAD s/p 1stent ([facility] 12/2021), HLD, GERD, gout, DM, HTN, a-fib on plavix presents complaining of sudden onset BRBPR this morning. Patient has had 4 episodes total today, 2 at home and two while in the ED, states that every time the blood has filled the toilet bowl. Denies any prodromal chest pain, shortness of breath, palpitations or sensations of irregular heart rate,, abdominal pain, cramping, nausea, vomiting, diarrhea blood in urine or stool prior to episodes today. No recent NSAID use, history of GI bleeds, personal or family history of cancer. Last colonoscopy 3 years ago showed polyps that required clips.  (24 Sep 2022 17:53)      INTERIM EVENTS:Patient seen at bedside ,interim events noted.      PMH -reviewed admission note, no change since admission  HEART FAILURE: Acute[ ]Chronic[ ] Systolic[ ] Diastolic[ ] Combined Systolic and Diastolic[ ]  CAD[ ] CABG[ ] PCI[ ]  DEVICES[ ] PPM[ ] ICD[ ] ILR[ ]  ATRIAL FIBRILLATION[ ] Paroxysmal[ ] Permanent[ ] CHADS2-[  ]  RADHA[ ] CKD1[ ] CKD2[ ] CKD3[ ] CKD4[ ] ESRD[ ]  COPD[ ] HTN[ ]   DM[ ] Type1[ ] Type 2[ ]   CVA[ ] Paresis[ ]    AMBULATION: Assisted[ ] Cane/walker[ ] Independent[ ]    MEDICATIONS  (STANDING):  allopurinol 100 milliGRAM(s) Oral daily  amLODIPine   Tablet 10 milliGRAM(s) Oral at bedtime  aspirin  chewable 81 milliGRAM(s) Oral daily  atorvastatin 80 milliGRAM(s) Oral at bedtime  atorvastatin 20 milliGRAM(s) Oral at bedtime  carvedilol 12.5 milliGRAM(s) Oral every 12 hours  cloNIDine Patch 0.3 mG/24Hr(s) 1 patch Transdermal every 7 days  gabapentin 400 milliGRAM(s) Oral daily  heparin   Injectable 5000 Unit(s) SubCutaneous every 8 hours  insulin lispro (ADMELOG) corrective regimen sliding scale   SubCutaneous Before meals and at bedtime  labetalol 200 milliGRAM(s) Oral every 8 hours  pantoprazole  Injectable 40 milliGRAM(s) IV Push two times a day    MEDICATIONS  (PRN):  aluminum hydroxide/magnesium hydroxide/simethicone Suspension 30 milliLiter(s) Oral every 4 hours PRN Dyspepsia  melatonin 3 milliGRAM(s) Oral at bedtime PRN Insomnia  ondansetron Injectable 4 milliGRAM(s) IV Push every 8 hours PRN Nausea and/or Vomiting            REVIEW OF SYSTEMS:  Constitutional: [ ] fever, [ ]weight loss,  [ ]fatigue [ ]weight gain  Eyes: [ ] visual changes  Respiratory: [ ]shortness of breath;  [ ] cough, [ ]wheezing, [ ]chills, [ ]hemoptysis  Cardiovascular: [ ] chest pain, [ ]palpitations, [ ]dizziness,  [ ]leg swelling[ ]orthopnea[ ]PND  Gastrointestinal: [ ] abdominal pain, [ ]nausea, [ ]vomiting,  [ ]diarrhea [ ]Constipation [ ]Melena  Genitourinary: [ ] dysuria, [ ] hematuria [ ]Dobbs  Neurologic: [ ] headaches [ ] tremors[ ]weakness [ ]Paralysis Right[ ] Left[ ]  Skin: [ ] itching, [ ]burning, [ ] rashes  Endocrine: [ ] heat or cold intolerance  Musculoskeletal: [ ] joint pain or swelling; [ ] muscle, back, or extremity pain  Psychiatric: [ ] depression, [ ]anxiety, [ ]mood swings, or [ ]difficulty sleeping  Hematologic: [ ] easy bruising, [ ] bleeding gums    [ ] All remaining systems negative except as per above.   [ ]Unable to obtain.  [x] No change in ROS since admission      Vital Signs Last 24 Hrs  T(C): 36.8 (28 Sep 2022 20:09), Max: 37.2 (28 Sep 2022 00:00)  T(F): 98.3 (28 Sep 2022 20:09), Max: 98.9 (28 Sep 2022 00:00)  HR: 77 (28 Sep 2022 20:09) (70 - 89)  BP: 175/64 (28 Sep 2022 20:09) (156/53 - 179/76)  BP(mean): 76 (28 Sep 2022 00:00) (76 - 90)  RR: 18 (28 Sep 2022 20:09) (17 - 23)  SpO2: 99% (28 Sep 2022 20:09) (98% - 100%)    Parameters below as of 28 Sep 2022 20:09  Patient On (Oxygen Delivery Method): room air      I&O's Summary    27 Sep 2022 07:01  -  28 Sep 2022 07:00  --------------------------------------------------------  IN: 2600 mL / OUT: 2640 mL / NET: -40 mL    28 Sep 2022 07:01  -  28 Sep 2022 20:34  --------------------------------------------------------  IN: 1300 mL / OUT: 600 mL / NET: 700 mL        PHYSICAL EXAM:  General: No acute distress BMI-  HEENT: EOMI, PERRL  Neck: Supple, [ ] JVD  Lungs: Equal air entry bilaterally; [ ] rales [ ] wheezing [ ] rhonchi  Heart: Regular rate and rhythm; [x ] murmur   2/6 [ x] systolic [ ] diastolic [ ] radiation[ ] rubs [ ]  gallops  Abdomen: Nontender, bowel sounds present  Extremities: No clubbing, cyanosis, [ ] edema [ ]Pulses  equal and intact  Nervous system:  Alert & Oriented X3, no focal deficits  Psychiatric: Normal affect  Skin: No rashes or lesions    LABS:  09-28    144  |  112<H>  |  57<H>  ----------------------------<  148<H>  4.1   |  14<L>  |  4.26<H>    Ca    8.7      28 Sep 2022 07:10  Phos  3.9     09-28  Mg     2.10     09-28      Creatinine Trend: 4.26<--, 4.44<--, 4.44<--, 4.38<--, 3.90<--, 3.88<--                        8.2    6.11  )-----------( 190      ( 28 Sep 2022 12:55 )             24.7     PT/INR - ( 28 Sep 2022 07:10 )   PT: 10.9 sec;   INR: 0.94 ratio         PTT - ( 28 Sep 2022 07:10 )  PTT:28.2 sec

## 2022-09-28 NOTE — PROGRESS NOTE ADULT - ASSESSMENT
ASSESSMENT: 76 yo M pmh CAD s/p 1stent ([facility] 12/2021), HLD, GERD, gout, DM, HTN, ?a-fib (patient denies), on plavix (last dose 9/22, also on aspirin) presents complaining of sudden onset BRBPR this morning. Surgery consulted for evaluation of lower GI bleed s/p colonoscopy revealing pandiverticulosis and one polyp without source of active bleeding. Hospital course complicated by CODE STROKE 9/28 AM.    PLAN:     - fu Neuro Stroke recommendations: MRI Brain w/o, TTE, telemetry, neuro checks q4 hrs, ASA 81mg/SQH dvt ppx, Atorvastatin 80mg daily, dysphagia screen, permissive hypertension up to 220/120 x24 hours  - Regular diet (high fiber)  - OOB/Ambulate    General Surgery  B Team  [id number]

## 2022-09-28 NOTE — STROKE CODE NOTE - NIH STROKE SCALE: 10. DYSARTHRIA, QM
11/08/18 1510   Pain Assessment   Pain Assessment No/denies pain   QI: Sit to Stand   Assistance Needed Incidental touching   Sit to Stand CARE Score 4   QI: Chair/Bed-to-Chair Transfer   Assistance Needed Incidental touching   Chair/Bed-to-Chair Transfer CARE Score 4   Transfer Bed/Chair/Wheelchair   Limitations Noted In Balance;LE Strength   Stand Pivot Contact Guard   Sit to [employer]   Stand to [facility], Chair, Wheelchair Transfer (FIM) 4 - Patient requires steadying assist or light touching   Equipment Use   NuStep L4 20mins   Assessment   Treatment Assessment session on nustep working on improving strength, endurance training and coordination to improve overall activity tolerance to progress with functional mobility  cont POC    PT Therapy Minutes   PT Time In 1510   PT Time Out 1530   PT Total Time (minutes) 20   PT Mode of treatment - Individual (minutes) 20   PT Mode of treatment - Concurrent (minutes) 0   PT Mode of treatment - Group (minutes) 0   PT Mode of treatment - Co-treat (minutes) 0   PT Mode of Teatment - Total time(minutes) 20 minutes   Therapy Time missed   Time missed?  No (1) Mild-to-moderate dysarthria; patient slurs at least some words and, at worst, can be understood with some difficulty

## 2022-09-28 NOTE — PROGRESS NOTE ADULT - SUBJECTIVE AND OBJECTIVE BOX
Chief Complaint:  Patient is a 77y old  Male who presents with a chief complaint of BRBPR (28 Sep 2022 08:58)      Reason for consult:    Interval Events:     Hospital Medications:  allopurinol 100 milliGRAM(s) Oral daily  aluminum hydroxide/magnesium hydroxide/simethicone Suspension 30 milliLiter(s) Oral every 4 hours PRN  amLODIPine   Tablet 10 milliGRAM(s) Oral at bedtime  aspirin  chewable 81 milliGRAM(s) Oral daily  atorvastatin 80 milliGRAM(s) Oral at bedtime  atorvastatin 20 milliGRAM(s) Oral at bedtime  carvedilol 12.5 milliGRAM(s) Oral every 12 hours  chlorhexidine 4% Liquid 1 Application(s) Topical daily  cloNIDine Patch 0.3 mG/24Hr(s) 1 patch Transdermal every 7 days  gabapentin 400 milliGRAM(s) Oral daily  heparin   Injectable 5000 Unit(s) SubCutaneous every 8 hours  insulin lispro (ADMELOG) corrective regimen sliding scale   SubCutaneous Before meals and at bedtime  labetalol 200 milliGRAM(s) Oral every 8 hours  melatonin 3 milliGRAM(s) Oral at bedtime PRN  ondansetron Injectable 4 milliGRAM(s) IV Push every 8 hours PRN  pantoprazole  Injectable 40 milliGRAM(s) IV Push two times a day  sodium chloride 0.9% Bolus 1000 milliLiter(s) IV Bolus once      ROS:   General:  No  fevers, chills, night sweats, fatigue  Eyes:  Good vision, no reported pain  ENT:  No sore throat, pain, runny nose  CV:  No pain, palpitations  Pulm:  No dyspnea, cough  GI:  See HPI, otherwise negative  :  No  incontinence, nocturia  Muscle:  No pain, weakness  Neuro:  No memory problems  Psych:  No insomnia, mood problems, depression  Endocrine:  No polyuria, polydipsia, cold/heat intolerance  Heme:  No petechiae, ecchymosis, easy bruisability  Skin:  No rash    PHYSICAL EXAM:   Vital Signs:  Vital Signs Last 24 Hrs  T(C): 36.9 (28 Sep 2022 09:15), Max: 37.2 (28 Sep 2022 00:00)  T(F): 98.4 (28 Sep 2022 09:15), Max: 98.9 (28 Sep 2022 00:00)  HR: 73 (28 Sep 2022 09:15) (73 - 89)  BP: 158/61 (28 Sep 2022 09:15) (153/68 - 179/76)  BP(mean): 76 (28 Sep 2022 00:00) (76 - 97)  RR: 17 (28 Sep 2022 09:15) (16 - 23)  SpO2: 100% (28 Sep 2022 09:15) (98% - 100%)    Parameters below as of 28 Sep 2022 09:15  Patient On (Oxygen Delivery Method): room air      Daily     Daily     GENERAL: no acute distress  NEURO: alert  HEENT: anicteric sclera, no conjunctival pallor appreciated  CHEST: no respiratory distress, no accessory muscle use  CARDIAC: regular rate, rhythm  ABDOMEN: soft, non-tender, non-distended, no rebound or guarding  EXTREMITIES: warm, well perfused, no edema  SKIN: no lesions noted    LABS: reviewed                        9.2    8.13  )-----------( 202      ( 27 Sep 2022 06:12 )             27.0     09-27    136  |  106  |  65<H>  ----------------------------<  114<H>  4.1   |  16<L>  |  4.44<H>    Ca    8.5      27 Sep 2022 01:06  Phos  3.1     09-27  Mg     1.70     09-27          Interval Diagnostic Studies: see sunrise for full report   Chief Complaint:  Patient is a 77y old  Male who presents with a chief complaint of BRBPR (28 Sep 2022 08:58)       Interval Events:   Code stroke called for patient  Dysarthria and rt sided weakness  Not a candidate for tpa  This AM patient feels improvement in sx  No BM    Hospital Medications:  allopurinol 100 milliGRAM(s) Oral daily  aluminum hydroxide/magnesium hydroxide/simethicone Suspension 30 milliLiter(s) Oral every 4 hours PRN  amLODIPine   Tablet 10 milliGRAM(s) Oral at bedtime  aspirin  chewable 81 milliGRAM(s) Oral daily  atorvastatin 80 milliGRAM(s) Oral at bedtime  atorvastatin 20 milliGRAM(s) Oral at bedtime  carvedilol 12.5 milliGRAM(s) Oral every 12 hours  chlorhexidine 4% Liquid 1 Application(s) Topical daily  cloNIDine Patch 0.3 mG/24Hr(s) 1 patch Transdermal every 7 days  gabapentin 400 milliGRAM(s) Oral daily  heparin   Injectable 5000 Unit(s) SubCutaneous every 8 hours  insulin lispro (ADMELOG) corrective regimen sliding scale   SubCutaneous Before meals and at bedtime  labetalol 200 milliGRAM(s) Oral every 8 hours  melatonin 3 milliGRAM(s) Oral at bedtime PRN  ondansetron Injectable 4 milliGRAM(s) IV Push every 8 hours PRN  pantoprazole  Injectable 40 milliGRAM(s) IV Push two times a day  sodium chloride 0.9% Bolus 1000 milliLiter(s) IV Bolus once      ROS:   Complete and normal except as mentioned above.    PHYSICAL EXAM:   Vital Signs:  Vital Signs Last 24 Hrs  T(C): 36.9 (28 Sep 2022 09:15), Max: 37.2 (28 Sep 2022 00:00)  T(F): 98.4 (28 Sep 2022 09:15), Max: 98.9 (28 Sep 2022 00:00)  HR: 73 (28 Sep 2022 09:15) (73 - 89)  BP: 158/61 (28 Sep 2022 09:15) (153/68 - 179/76)  BP(mean): 76 (28 Sep 2022 00:00) (76 - 97)  RR: 17 (28 Sep 2022 09:15) (16 - 23)  SpO2: 100% (28 Sep 2022 09:15) (98% - 100%)    Parameters below as of 28 Sep 2022 09:15  Patient On (Oxygen Delivery Method): room air      Daily     Daily     GENERAL:  Well developed, no distress  HEENT:  NC/AT,  conjunctivae clear, sclera anicteric  CHEST:  Full & symmetric excursion, no increased effort w/ respirations  HEART:  Regular rhythm & rate  ABDOMEN:  Soft, non-tender, non-distended  EXTREMITIES:  no LE  edema; 4/5 Rt sided strength  SKIN:  No rash/erythema/ecchymoses/petechiae/wounds/jaundice  NEURO:  Alert, orientedx3    LABS: reviewed                        9.2    8.13  )-----------( 202      ( 27 Sep 2022 06:12 )             27.0     09-27    136  |  106  |  65<H>  ----------------------------<  114<H>  4.1   |  16<L>  |  4.44<H>    Ca    8.5      27 Sep 2022 01:06  Phos  3.1     09-27  Mg     1.70     09-27          Interval Diagnostic Studies: see sunrise for full report

## 2022-09-28 NOTE — CONSULT NOTE ADULT - ATTENDING COMMENTS
Bleeding  EGD colonoscopy this week  Continue to hold plavix
Critical Care Dx  I was called by the medical team during a medical rapid response stating the patient may be suffering from ongoing hemorrhage which may require surgical intervention. Our general surgical and critical care team went to assess the patient immediately. He was previously admitted to the medical team at which time he was resuscitated well. GI deferred scope in the setting of acute bleed ing but without cardiac clearance.     K92.2 Lower GI bleed   Admit to SICU for constant monitoring  Presumed LGIB based on exam - hold all anticoagulant/antiplatelets. Transfuse to crit > 21 and MAP of 65. Stop BID protonix in the setting of probably lower GI bleed.   Await colonoscopy for potential endoscopic mgmt of colonic source of bleed   Surgical mgmt reserved for hemodynamic collapse and only after source confirmed     E11.9 Type 2 diabetes mellitus without complication, with long-term current use of insulin   Z79.4 Long term (current) use of insulin   -basal/bolus regimen to target BG < 180    N17.9 RADHA (acute kidney injury)   -volume resuscitated, creatinine will normalize once hemodynamically stable and well perfused  -monitor u/o, electrolytes      The patient is a critical care patient with life threatening hemodynamic and respiratory instability in SICU.  I have personally interviewed and examined the patient, reviewed data and laboratory tests/x-rays and all pertinent electronic images.  The SICU team has a constant risk benefit analyzes discussion with the primary team, all consultants, House Staff and PA's on all decisions.   Time involved in performance of separately billable procedures was not counted toward my critical care time. There is no overlap.    I have personally provided 65 minutes of critical care time concurrently with the resident/fellow. This time excludes time spent on separate procedures and time spent teaching. I have reviewed the resident's/fellow's documentation and agree with the assessment and plan of care.  I was physically present for the key portions of the evaluation and management (E/M) service provided.      Deshawn Parmar MD  Acute and Critical Care Surgery
Please see follow up note for my SICU admission documentation.   Agree with below mgmt at time of documentation    Deshawn Parmar MD  Acute and Critical Care Surgery    The Acute Care Surgery (B Team) Attending Group Practice:  Dr. Kwaku Cedillo, Dr. Bal Guerra, Dr. Deshawn Parmar,  Dr. Cecilio Amaya and Dr. Laura Catalan     Urgent issues - spectra [id number] or [id number]  Nonurgent issues - [phone]  Patient appointments or after hours - [phone]
Impression : Right hemisensory loss, Right hemiparesis, dysarthria -with previous evidence of lacunar infarcts and white matter disease on Imaging - likely secondary to Lacunar infarction   MRI -P  Aggressive risk factor control  Ok to continue antithrombotics - clopidogrel  - if ok with GI.  Statins  To confirm if there is h/o Afib - if yes, then recommends long term anti-c with NOAC if ok with GI/ medicine.  Stroke team will follow.

## 2022-09-28 NOTE — OCCUPATIONAL THERAPY INITIAL EVALUATION ADULT - GROSSLY INTACT, SENSORY
Reports diminished sensation of right UE/LE compared to left UE/LE, however, remains grossly intact to light touch

## 2022-09-28 NOTE — CONSULT NOTE ADULT - ASSESSMENT
77 year old M with past medical history of CAD s/p 1stent ([facility] 12/2021), HLD, GERD, gout, DM, HTN, a-fib on plavix, presented to [facility] for complaints of sudden onset BRBPR; undergoing workup for potential LGIB; found to have moderate pandiverticulosis without evidence of diverticular bleeding on colonoscopy on 9/27. RRT was called twice on 9/26 for uncontrolled, multiple episodes of hematochezia; patient has received a total of 6u of PRBC during this admission for Hg as low as 7.9. Patient recently downgraded from the SICU last night. Code stroke called this morning for new onset slurred speech and weakness on the R side (arm/leg). LKW 1:00 am on 9/28 right before patient went to bed. Patient reports waking up at 4:00 am 9/28 with symptoms at this time. Denies this ever happening before. Exam as above. CTH without acute findings. CTA H/N pending.     NIHSS: 4  mRS: 1    Patient is not a tPA candidate because they are outside of the appropriate window of treatment.  Patient is not a mechanical thrombectomy candidate because no evidence of LVO.    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     Recommendations    Imaging  [] MRI brain w/o contrast   [] TTE     Meds  [] Aspirin 81 mg for now if no contraindications & cleared by GI   [] Atorvastatin 80, titrate to LDL<70  [] DVT prophylaxis    Labs  [] CBC, CMP,  Coags, Troponin  [] HbA1C and Lipid Panel    Other  [] Telemonitoring;  Neurochecks and Vital signs per unit protocol  [] Permissive HTN up to 220/120 mmHg for first 24 hours after the symptom onset followed by gradual normotension. Give IVF PRN to keep pressure up during this time.   [] BGM goals 140-180  [] PT/OT evaluation  [] NPO until clears Dysphagia screen, otherwise Swallow evaluation    Patient case discussed with stroke fellow, Dr. Neal, under supervision of stroke attending, Dr. Emmanuel. Patient to be seen on morning rounds.     Please call with questions: [phone]

## 2022-09-28 NOTE — PROGRESS NOTE ADULT - SUBJECTIVE AND OBJECTIVE BOX
TEAM [ ** ] Surgery Daily Progress Note  =====================================================    SUBJECTIVE: Patient seen and examined at bedside on AM rounds. Patient reports that they're feeling well. NPO/Tolerating diet, denies nausea, vomiting.    Flatus/    BM. OOB/Amublating as tolerated. Denies fever, chills.    PMH:   ***    ALLERGIES:  No Known Allergies      --------------------------------------------------------------------------------------    MEDICATIONS:    Neurologic Medications  gabapentin 400 milliGRAM(s) Oral daily  melatonin 3 milliGRAM(s) Oral at bedtime PRN Insomnia  ondansetron Injectable 4 milliGRAM(s) IV Push every 8 hours PRN Nausea and/or Vomiting    Respiratory Medications    Cardiovascular Medications  amLODIPine   Tablet 10 milliGRAM(s) Oral at bedtime  carvedilol 12.5 milliGRAM(s) Oral every 12 hours  cloNIDine Patch 0.3 mG/24Hr(s) 1 patch Transdermal every 7 days  labetalol 200 milliGRAM(s) Oral every 8 hours    Gastrointestinal Medications  aluminum hydroxide/magnesium hydroxide/simethicone Suspension 30 milliLiter(s) Oral every 4 hours PRN Dyspepsia  pantoprazole  Injectable 40 milliGRAM(s) IV Push two times a day  sodium chloride 0.9% Bolus 1000 milliLiter(s) IV Bolus once    Genitourinary Medications    Hematologic/Oncologic Medications  aspirin  chewable 81 milliGRAM(s) Oral daily  heparin   Injectable 5000 Unit(s) SubCutaneous every 8 hours    Antimicrobial/Immunologic Medications    Endocrine/Metabolic Medications  allopurinol 100 milliGRAM(s) Oral daily  atorvastatin 80 milliGRAM(s) Oral at bedtime  atorvastatin 20 milliGRAM(s) Oral at bedtime  insulin lispro (ADMELOG) corrective regimen sliding scale   SubCutaneous Before meals and at bedtime    Topical/Other Medications  chlorhexidine 4% Liquid 1 Application(s) Topical daily    --------------------------------------------------------------------------------------    VITAL SIGNS:  T(C): 36.7 (09-28-22 @ 05:00), Max: 37.2 (09-28-22 @ 00:00)  HR: 80 (09-28-22 @ 05:00) (76 - 89)  BP: 160/80 (09-28-22 @ 05:00) (153/68 - 179/76)  RR: 18 (09-28-22 @ 05:00) (14 - 23)  SpO2: 100% (09-28-22 @ 05:00) (98% - 100%)  --------------------------------------------------------------------------------------    EXAM    General: NAD, resting in bed comfortably.  Cardiac: regular rate, warm and well perfused  Respiratory: Nonlabored respirations, normal cw expansion.  Abdomen: soft, nontender, nondistended. ___ incision is c/d/i, ostNELIDA gallagher foley.   Extremities: normal strength, FROM, no deformities    --------------------------------------------------------------------------------------    LABS    --------------------------------------------------------------------------------------    INS AND OUTS:    09-27-22 @ 07:01  -  09-28-22 @ 07:00  --------------------------------------------------------  IN: 2600 mL / OUT: 2640 mL / NET: -40 mL      -------------------------------------------------------------------------------------- TEAM [ B ] Surgery Daily Progress Note  =====================================================    SUBJECTIVE: Patient seen and examined at bedside on AM rounds. Patient complains of right sided "heaviness" of his right leg and right arm, associated with slurred speech. Patient reports that he went to bed at 1am normal, woke up around 4am with his symptoms.    Of note, tolerating regular diet. +/+ bowel function. Last BM yesterday 0900 reportedly brown (prior to scope). s/p colonoscopy revealing pan diverticulosis and one polyp.    PAST MEDICAL & SURGICAL HISTORY:  DM (diabetes mellitus)  Gout  HTN (hypertension)  BPH (benign prostatic hyperplasia)  H/O eyeurgery      ALLERGIES:  No Known Allergies      --------------------------------------------------------------------------------------    MEDICATIONS:    Neurologic Medications  gabapentin 400 milliGRAM(s) Oral daily  melatonin 3 milliGRAM(s) Oral at bedtime PRN Insomnia  ondansetron Injectable 4 milliGRAM(s) IV Push every 8 hours PRN Nausea and/or Vomiting    Cardiovascular Medications  amLODIPine   Tablet 10 milliGRAM(s) Oral at bedtime  carvedilol 12.5 milliGRAM(s) Oral every 12 hours  cloNIDine Patch 0.3 mG/24Hr(s) 1 patch Transdermal every 7 days  labetalol 200 milliGRAM(s) Oral every 8 hours    Gastrointestinal Medications  aluminum hydroxide/magnesium hydroxide/simethicone Suspension 30 milliLiter(s) Oral every 4 hours PRN Dyspepsia  pantoprazole  Injectable 40 milliGRAM(s) IV Push two times a day  sodium chloride 0.9% Bolus 1000 milliLiter(s) IV Bolus once    Hematologic/Oncologic Medications  aspirin  chewable 81 milliGRAM(s) Oral daily  heparin   Injectable 5000 Unit(s) SubCutaneous every 8 hours    Endocrine/Metabolic Medications  allopurinol 100 milliGRAM(s) Oral daily  atorvastatin 80 milliGRAM(s) Oral at bedtime  atorvastatin 20 milliGRAM(s) Oral at bedtime  insulin lispro (ADMELOG) corrective regimen sliding scale   SubCutaneous Before meals and at bedtime    Topical/Other Medications  chlorhexidine 4% Liquid 1 Application(s) Topical daily    --------------------------------------------------------------------------------------    VITAL SIGNS:  T(C): 36.7 (09-28-22 @ 05:00), Max: 37.2 (09-28-22 @ 00:00)  HR: 80 (09-28-22 @ 05:00) (76 - 89)  BP: 160/80 (09-28-22 @ 05:00) (153/68 - 179/76)  RR: 18 (09-28-22 @ 05:00) (14 - 23)  SpO2: 100% (09-28-22 @ 05:00) (98% - 100%)  --------------------------------------------------------------------------------------    EXAM    General: NAD, resting in bed comfortably.  Cardiac: regular rate, warm and well perfused  Respiratory: Nonlabored respirations, normal cw expansion.  Abdomen: soft, nontender, nondistended.    Extremities: normal strength, FROM, no deformities    --------------------------------------------------------------------------------------    LABS    --------------------------------------------------------------------------------------    INS AND OUTS:    09-27-22 @ 07:01  -  09-28-22 @ 07:00  --------------------------------------------------------  IN: 2600 mL / OUT: 2640 mL / NET: -40 mL      --------------------------------------------------------------------------------------

## 2022-09-28 NOTE — CONSULT NOTE ADULT - SUBJECTIVE AND OBJECTIVE BOX
INCOMPLETE    SUBJECTIVE: Patient is a 77 year old M with past medical history of CAD s/p 1stent ([facility] 12/2021), HLD, GERD, gout, DM, HTN, a-fib on plavix, presented to [facility] for complaints of sudden onset BRBPR; undergoing workup for potential LGIB; found to have moderate pandiverticulosis without evidence of diverticular bleeding on colonoscopy on 9/27. RRT was called twice on 9/26 for uncontrolled, multiple episodes of hematochezia; patient has received a total of 6u of PRBC during this admission for Hg as low as 7.9. Patient recently downgraded from the SICU last night.       INTERVAL HISTORYCode stroke called this morning for new onset slurred speech and weakness on the R side (arm/leg). LKW 1:00 am on 9/28 right before patient went to bed. Patient reports waking up at 4:00 am 9/28 with symptoms at this time. Denies this ever happening before.       PAST MEDICAL & SURGICAL HISTORY:  DM (diabetes mellitus)      Gout      HTN (hypertension)      BPH (benign prostatic hyperplasia)      H/O eye surgery        FAMILY HISTORY:  Family history of diabetes mellitus (Mother)    Family history of hypertension (Mother)      SOCIAL HISTORY:   T/E/D:   Occupation:   Lives with:     MEDICATIONS (HOME):  Home Medications:  allopurinol 100 mg oral tablet: 1 tab(s) orally once a day (24 Sep 2022 20:44)  amlodipine-benazepril 10 mg-40 mg oral capsule: 1 cap(s) orally once a day (24 Sep 2022 20:41)  atorvastatin 20 mg oral tablet: 1 tab(s) orally once a day (at bedtime) (03 Jul 2015 13:00)  Coreg 12.5 mg oral tablet: 1 tab(s) orally 2 times a day (24 Sep 2022 20:44)  gabapentin 400 mg oral capsule: 1 cap(s) orally once a day (24 Sep 2022 20:39)  gemfibrozil 600 mg oral tablet: 1 tab(s) orally 3 times a day (24 Sep 2022 20:40)  labetalol 200 mg oral tablet: 1 tab(s) orally 3 times a day (24 Sep 2022 20:39)  Ozempic 2 mg/1.5 mL (0.25 mg or 0.5 mg dose) subcutaneous solution:  (24 Sep 2022 20:41)  Plavix 75 mg oral tablet: 1 tab(s) orally once a day (24 Sep 2022 20:40)  Tresiba 100 units/mL subcutaneous solution: 80 unit(s) subcutaneous (24 Sep 2022 20:42)    MEDICATIONS  (STANDING):  allopurinol 100 milliGRAM(s) Oral daily  amLODIPine   Tablet 10 milliGRAM(s) Oral at bedtime  atorvastatin 20 milliGRAM(s) Oral at bedtime  carvedilol 12.5 milliGRAM(s) Oral every 12 hours  chlorhexidine 4% Liquid 1 Application(s) Topical daily  cloNIDine Patch 0.3 mG/24Hr(s) 1 patch Transdermal every 7 days  gabapentin 400 milliGRAM(s) Oral daily  insulin lispro (ADMELOG) corrective regimen sliding scale   SubCutaneous Before meals and at bedtime  labetalol 200 milliGRAM(s) Oral every 8 hours  pantoprazole  Injectable 40 milliGRAM(s) IV Push two times a day    MEDICATIONS  (PRN):  aluminum hydroxide/magnesium hydroxide/simethicone Suspension 30 milliLiter(s) Oral every 4 hours PRN Dyspepsia  melatonin 3 milliGRAM(s) Oral at bedtime PRN Insomnia  ondansetron Injectable 4 milliGRAM(s) IV Push every 8 hours PRN Nausea and/or Vomiting    ALLERGIES/INTOLERANCES:  Allergies  No Known Allergies    Intolerances    VITALS & EXAMINATION:  Vital Signs Last 24 Hrs  T(C): 36.7 (28 Sep 2022 05:00), Max: 37.2 (28 Sep 2022 00:00)  T(F): 98 (28 Sep 2022 05:00), Max: 98.9 (28 Sep 2022 00:00)  HR: 80 (28 Sep 2022 05:00) (73 - 89)  BP: 160/80 (28 Sep 2022 05:00) (153/68 - 179/76)  BP(mean): 76 (28 Sep 2022 00:00) (76 - 105)  RR: 18 (28 Sep 2022 05:00) (14 - 23)  SpO2: 100% (28 Sep 2022 05:00) (98% - 100%)    Parameters below as of 28 Sep 2022 05:00  Patient On (Oxygen Delivery Method): room air        General:  Constitutional: Male, appears stated age, in no apparent distress including pain  Head: Normocephalic & Atraumatic.    Neurological:  MS: Eye open. Awake, alert, oriented to person, place, situation, time. Follows all commands.    Language: Speech is mild to moderately dysarthric, fluent with good repetition.    CNs: VFF. EOMI no nystagmus. V1-3 intact to LT b/l. Mild R facial palsy, frontalis sparing.    Motor: Normal muscle bulk & tone. No noticeable tremor. Drift of RUE/RLE. No drift of LUE/LLE.               Deltoid	Biceps	Triceps	   R	    4+	   4	    4	 4	  		 	  L	    5	   5	    5	 5	  		    	H-Flex	  R	    4	     		   L	    5	    		     Sensation: Mildly decreased sensation to LT on RUE/RLE.     Cortical: Extinction on DSS (neglect): none    Reflexes:              Biceps(C5)       BR(C6)     Triceps(C7)               Patellar(L4)    Achilles(S1)      R	              2	          1	             1		    2		    0		     L	              1	          1	             1		     2		    0		          Coordination: No dysmetria to FTN b/l     Gait: Deferred.       LABORATORY:  CBC                       9.2    8.13  )-----------( 202      ( 27 Sep 2022 06:12 )             27.0     Chem 09-27    136  |  106  |  65<H>  ----------------------------<  114<H>  4.1   |  16<L>  |  4.44<H>    Ca    8.5      27 Sep 2022 01:06  Phos  3.1     09-27  Mg     1.70     09-27      LFTs   Coagulopathy PT/INR - ( 27 Sep 2022 01:06 )   PT: 12.8 sec;   INR: 1.10 ratio         PTT - ( 27 Sep 2022 01:06 )  PTT:23.2 sec  Lipid Panel   A1c   Cardiac enzymes     U/A   CSF  Immunological  Other    STUDIES & IMAGING:  Studies (EKG, EEG, EMG, etc):     Radiology (XR, CT, MR, U/S, TTE/ALEXA): HPI: Patient is a 77 year old M with past medical [facility] HLD, GERD, gout, DM, HTN, a-fib on plavix, recent presentation at [facility] in 7/2022 for intermittent 1 month episodes L sided weakness/numbness with MRI brain showing no acute infarcts (dx of carpal tunnel syndrome & lumbar radiculopathy at that time) presented to [facility] on 9/24 for complaints of sudden onset BRBPR; undergoing workup for potential LGIB. RRT was called twice on 9/26 for uncontrolled, multiple episodes of hematochezia; patient has received a total of 6u of PRBC during this admission for Hg as low as 7.9. He was found to have moderate pandiverticulosis without evidence of diverticular bleeding on colonoscopy on 9/27 Patient recently downgraded from the SICU last night.     INTERVAL HISTORY: Code stroke called this morning for new onset slurred speech and weakness on the R side (arm/leg). LKW 1:00 am on 9/28 right before patient went to bed. Patient reports waking up at 4:00 am 9/28 with symptoms at this time. Denies this ever happening before.       PAST MEDICAL & SURGICAL HISTORY:  DM (diabetes mellitus)      Gout      HTN (hypertension)      BPH (benign prostatic hyperplasia)      H/O eye surgery        FAMILY HISTORY:  Family history of diabetes mellitus (Mother)    Family history of hypertension (Mother)      SOCIAL HISTORY:   T/E/D:   Occupation:   Lives with:     MEDICATIONS (HOME):  Home Medications:  allopurinol 100 mg oral tablet: 1 tab(s) orally once a day (24 Sep 2022 20:44)  amlodipine-benazepril 10 mg-40 mg oral capsule: 1 cap(s) orally once a day (24 Sep 2022 20:41)  atorvastatin 20 mg oral tablet: 1 tab(s) orally once a day (at bedtime) (03 Jul 2015 13:00)  Coreg 12.5 mg oral tablet: 1 tab(s) orally 2 times a day (24 Sep 2022 20:44)  gabapentin 400 mg oral capsule: 1 cap(s) orally once a day (24 Sep 2022 20:39)  gemfibrozil 600 mg oral tablet: 1 tab(s) orally 3 times a day (24 Sep 2022 20:40)  labetalol 200 mg oral tablet: 1 tab(s) orally 3 times a day (24 Sep 2022 20:39)  Ozempic 2 mg/1.5 mL (0.25 mg or 0.5 mg dose) subcutaneous solution:  (24 Sep 2022 20:41)  Plavix 75 mg oral tablet: 1 tab(s) orally once a day (24 Sep 2022 20:40)  Tresiba 100 units/mL subcutaneous solution: 80 unit(s) subcutaneous (24 Sep 2022 20:42)    MEDICATIONS  (STANDING):  allopurinol 100 milliGRAM(s) Oral daily  amLODIPine   Tablet 10 milliGRAM(s) Oral at bedtime  atorvastatin 20 milliGRAM(s) Oral at bedtime  carvedilol 12.5 milliGRAM(s) Oral every 12 hours  chlorhexidine 4% Liquid 1 Application(s) Topical daily  cloNIDine Patch 0.3 mG/24Hr(s) 1 patch Transdermal every 7 days  gabapentin 400 milliGRAM(s) Oral daily  insulin lispro (ADMELOG) corrective regimen sliding scale   SubCutaneous Before meals and at bedtime  labetalol 200 milliGRAM(s) Oral every 8 hours  pantoprazole  Injectable 40 milliGRAM(s) IV Push two times a day    MEDICATIONS  (PRN):  aluminum hydroxide/magnesium hydroxide/simethicone Suspension 30 milliLiter(s) Oral every 4 hours PRN Dyspepsia  melatonin 3 milliGRAM(s) Oral at bedtime PRN Insomnia  ondansetron Injectable 4 milliGRAM(s) IV Push every 8 hours PRN Nausea and/or Vomiting    ALLERGIES/INTOLERANCES:  Allergies  No Known Allergies    Intolerances    VITALS & EXAMINATION:  Vital Signs Last 24 Hrs  T(C): 36.7 (28 Sep 2022 05:00), Max: 37.2 (28 Sep 2022 00:00)  T(F): 98 (28 Sep 2022 05:00), Max: 98.9 (28 Sep 2022 00:00)  HR: 80 (28 Sep 2022 05:00) (73 - 89)  BP: 160/80 (28 Sep 2022 05:00) (153/68 - 179/76)  BP(mean): 76 (28 Sep 2022 00:00) (76 - 105)  RR: 18 (28 Sep 2022 05:00) (14 - 23)  SpO2: 100% (28 Sep 2022 05:00) (98% - 100%)    Parameters below as of 28 Sep 2022 05:00  Patient On (Oxygen Delivery Method): room air        General:  Constitutional: Male, appears stated age, in no apparent distress including pain  Head: Normocephalic & Atraumatic.    Neurological:  MS: Eye open. Awake, alert, oriented to person, place, situation, time. Follows all commands.    Language: Speech is mild to moderately dysarthric, fluent with good repetition.    CNs: VFF. EOMI no nystagmus. V1-3 intact to LT b/l. Mild R facial palsy, frontalis sparing.    Motor: Normal muscle bulk & tone. No noticeable tremor. Drift of RUE/RLE. No drift of LUE/LLE.               Deltoid	Biceps	Triceps	   R	    4+	   4	    4	 4	  		 	  L	    5	   5	    5	 5	  		    	H-Flex	  R	    4	     		   L	    5	    		     Sensation: Mildly decreased sensation to LT on RUE/RLE.     Cortical: Extinction on DSS (neglect): none    Reflexes:              Biceps(C5)       BR(C6)     Triceps(C7)               Patellar(L4)    Achilles(S1)      R	              2	          1	             1		    2		    0		     L	              1	          1	             1		     2		    0		          Coordination: No dysmetria to FTN b/l     Gait: Deferred.       LABORATORY:  CBC                       9.2    8.13  )-----------( 202      ( 27 Sep 2022 06:12 )             27.0     Chem 09-27    136  |  106  |  65<H>  ----------------------------<  114<H>  4.1   |  16<L>  |  4.44<H>    Ca    8.5      27 Sep 2022 01:06  Phos  3.1     09-27  Mg     1.70     09-27      LFTs   Coagulopathy PT/INR - ( 27 Sep 2022 01:06 )   PT: 12.8 sec;   INR: 1.10 ratio         PTT - ( 27 Sep 2022 01:06 )  PTT:23.2 sec  Lipid Panel   A1c   Cardiac enzymes     U/A   CSF  Immunological  Other    STUDIES & IMAGING:  Studies (EKG, EEG, EMG, etc):     Radiology (XR, CT, MR, U/S, TTE/ALEXA):    CTH: No acute pathology as per prelim read. Pending official read.    CTA H/N w/ IV contrast: pending official read.  HPI: Patient is a 77 year old M with past medical history of CAD s/p 1stent ([facility] 12/2021; on ASA & Plavix at baseline), HLD, GERD, gout, DM, HTN, a-fib not on AC, recent presentation at [facility] in 7/2021 for intermittent 1 month episodes L sided weakness/numbness with MRI brain showing no acute infarcts (dx of carpal tunnel syndrome & lumbar radiculopathy at that time) presented to [facility] on 9/24 for complaints of sudden onset BRBPR; undergoing workup for potential LGIB. RRT was called twice on 9/26 for uncontrolled, multiple episodes of hematochezia; patient has received a total of 6u of PRBC during this admission for Hg as low as 7.9. He was found to have moderate pandiverticulosis without evidence of diverticular bleeding on colonoscopy on 9/27 Patient recently downgraded from the SICU last night.     INTERVAL HISTORY: Code stroke called this morning for new onset slurred speech and weakness on the R side (arm/leg). LKW 1:00 am on 9/28 right before patient went to bed. Patient reports waking up at 4:00 am 9/28 with symptoms at this time. Denies this ever happening before.       PAST MEDICAL & SURGICAL HISTORY:  DM (diabetes mellitus)      Gout      HTN (hypertension)      BPH (benign prostatic hyperplasia)      H/O eye surgery        FAMILY HISTORY:  Family history of diabetes mellitus (Mother)    Family history of hypertension (Mother)      SOCIAL HISTORY:   T/E/D:   Occupation:   Lives with:     MEDICATIONS (HOME):  Home Medications:  allopurinol 100 mg oral tablet: 1 tab(s) orally once a day (24 Sep 2022 20:44)  amlodipine-benazepril 10 mg-40 mg oral capsule: 1 cap(s) orally once a day (24 Sep 2022 20:41)  atorvastatin 20 mg oral tablet: 1 tab(s) orally once a day (at bedtime) (03 Jul 2015 13:00)  Coreg 12.5 mg oral tablet: 1 tab(s) orally 2 times a day (24 Sep 2022 20:44)  gabapentin 400 mg oral capsule: 1 cap(s) orally once a day (24 Sep 2022 20:39)  gemfibrozil 600 mg oral tablet: 1 tab(s) orally 3 times a day (24 Sep 2022 20:40)  labetalol 200 mg oral tablet: 1 tab(s) orally 3 times a day (24 Sep 2022 20:39)  Ozempic 2 mg/1.5 mL (0.25 mg or 0.5 mg dose) subcutaneous solution:  (24 Sep 2022 20:41)  Plavix 75 mg oral tablet: 1 tab(s) orally once a day (24 Sep 2022 20:40)  Tresiba 100 units/mL subcutaneous solution: 80 unit(s) subcutaneous (24 Sep 2022 20:42)    MEDICATIONS  (STANDING):  allopurinol 100 milliGRAM(s) Oral daily  amLODIPine   Tablet 10 milliGRAM(s) Oral at bedtime  atorvastatin 20 milliGRAM(s) Oral at bedtime  carvedilol 12.5 milliGRAM(s) Oral every 12 hours  chlorhexidine 4% Liquid 1 Application(s) Topical daily  cloNIDine Patch 0.3 mG/24Hr(s) 1 patch Transdermal every 7 days  gabapentin 400 milliGRAM(s) Oral daily  insulin lispro (ADMELOG) corrective regimen sliding scale   SubCutaneous Before meals and at bedtime  labetalol 200 milliGRAM(s) Oral every 8 hours  pantoprazole  Injectable 40 milliGRAM(s) IV Push two times a day    MEDICATIONS  (PRN):  aluminum hydroxide/magnesium hydroxide/simethicone Suspension 30 milliLiter(s) Oral every 4 hours PRN Dyspepsia  melatonin 3 milliGRAM(s) Oral at bedtime PRN Insomnia  ondansetron Injectable 4 milliGRAM(s) IV Push every 8 hours PRN Nausea and/or Vomiting    ALLERGIES/INTOLERANCES:  Allergies  No Known Allergies    Intolerances    VITALS & EXAMINATION:  Vital Signs Last 24 Hrs  T(C): 36.7 (28 Sep 2022 05:00), Max: 37.2 (28 Sep 2022 00:00)  T(F): 98 (28 Sep 2022 05:00), Max: 98.9 (28 Sep 2022 00:00)  HR: 80 (28 Sep 2022 05:00) (73 - 89)  BP: 160/80 (28 Sep 2022 05:00) (153/68 - 179/76)  BP(mean): 76 (28 Sep 2022 00:00) (76 - 105)  RR: 18 (28 Sep 2022 05:00) (14 - 23)  SpO2: 100% (28 Sep 2022 05:00) (98% - 100%)    Parameters below as of 28 Sep 2022 05:00  Patient On (Oxygen Delivery Method): room air        General:  Constitutional: Male, appears stated age, in no apparent distress including pain  Head: Normocephalic & Atraumatic.    Neurological:  MS: Eye open. Awake, alert, oriented to person, place, situation, time. Follows all commands.    Language: Speech is mild to moderately dysarthric, fluent with good repetition.    CNs: VFF. EOMI no nystagmus. V1-3 intact to LT b/l. Mild R facial palsy, frontalis sparing.    Motor: Normal muscle bulk & tone. No noticeable tremor. Drift of RUE/RLE. No drift of LUE/LLE.               Deltoid	Biceps	Triceps	   R	    4+	   4	    4	 4	  		 	  L	    5	   5	    5	 5	  		    	H-Flex	  R	    4	     		   L	    5	    		     Sensation: Mildly decreased sensation to LT on RUE/RLE.     Cortical: Extinction on DSS (neglect): none    Reflexes:              Biceps(C5)       BR(C6)     Triceps(C7)               Patellar(L4)    Achilles(S1)      R	              2	          1	             1		    2		    0		     L	              1	          1	             1		     2		    0		          Coordination: No dysmetria to FTN b/l     Gait: Deferred.       LABORATORY:  CBC                       9.2    8.13  )-----------( 202      ( 27 Sep 2022 06:12 )             27.0     Chem 09-27    136  |  106  |  65<H>  ----------------------------<  114<H>  4.1   |  16<L>  |  4.44<H>    Ca    8.5      27 Sep 2022 01:06  Phos  3.1     09-27  Mg     1.70     09-27      LFTs   Coagulopathy PT/INR - ( 27 Sep 2022 01:06 )   PT: 12.8 sec;   INR: 1.10 ratio         PTT - ( 27 Sep 2022 01:06 )  PTT:23.2 sec  Lipid Panel   A1c   Cardiac enzymes     U/A   CSF  Immunological  Other    STUDIES & IMAGING:  Studies (EKG, EEG, EMG, etc):     Radiology (XR, CT, MR, U/S, TTE/ALEXA):    CTH: No acute pathology as per prelim read. Pending official read.    CTA H/N w/ IV contrast: pending official read.

## 2022-09-29 ENCOUNTER — TRANSCRIPTION ENCOUNTER (OUTPATIENT)
Age: 77
End: 2022-09-29

## 2022-09-29 LAB
ANION GAP SERPL CALC-SCNC: 13 MMOL/L — SIGNIFICANT CHANGE UP (ref 7–14)
BUN SERPL-MCNC: 46 MG/DL — HIGH (ref 7–23)
CALCIUM SERPL-MCNC: 8.7 MG/DL — SIGNIFICANT CHANGE UP (ref 8.4–10.5)
CHLORIDE SERPL-SCNC: 113 MMOL/L — HIGH (ref 98–107)
CHOLEST SERPL-MCNC: 134 MG/DL — SIGNIFICANT CHANGE UP
CO2 SERPL-SCNC: 18 MMOL/L — LOW (ref 22–31)
CREAT SERPL-MCNC: 4.16 MG/DL — HIGH (ref 0.5–1.3)
EGFR: 14 ML/MIN/1.73M2 — LOW
GLUCOSE BLDC GLUCOMTR-MCNC: 152 MG/DL — HIGH (ref 70–99)
GLUCOSE BLDC GLUCOMTR-MCNC: 178 MG/DL — HIGH (ref 70–99)
GLUCOSE BLDC GLUCOMTR-MCNC: 202 MG/DL — HIGH (ref 70–99)
GLUCOSE BLDC GLUCOMTR-MCNC: 248 MG/DL — HIGH (ref 70–99)
GLUCOSE SERPL-MCNC: 112 MG/DL — HIGH (ref 70–99)
HCT VFR BLD CALC: 24.4 % — LOW (ref 39–50)
HDLC SERPL-MCNC: 38 MG/DL — LOW
HGB BLD-MCNC: 8.2 G/DL — LOW (ref 13–17)
LIPID PNL WITH DIRECT LDL SERPL: 59 MG/DL — SIGNIFICANT CHANGE UP
MAGNESIUM SERPL-MCNC: 2 MG/DL — SIGNIFICANT CHANGE UP (ref 1.6–2.6)
MCHC RBC-ENTMCNC: 28.7 PG — SIGNIFICANT CHANGE UP (ref 27–34)
MCHC RBC-ENTMCNC: 33.6 GM/DL — SIGNIFICANT CHANGE UP (ref 32–36)
MCV RBC AUTO: 85.3 FL — SIGNIFICANT CHANGE UP (ref 80–100)
NON HDL CHOLESTEROL: 96 MG/DL — SIGNIFICANT CHANGE UP
NRBC # BLD: 0 /100 WBCS — SIGNIFICANT CHANGE UP (ref 0–0)
NRBC # FLD: 0 K/UL — SIGNIFICANT CHANGE UP (ref 0–0)
PHOSPHATE SERPL-MCNC: 3.6 MG/DL — SIGNIFICANT CHANGE UP (ref 2.5–4.5)
PLATELET # BLD AUTO: 207 K/UL — SIGNIFICANT CHANGE UP (ref 150–400)
POTASSIUM SERPL-MCNC: 4.1 MMOL/L — SIGNIFICANT CHANGE UP (ref 3.5–5.3)
POTASSIUM SERPL-SCNC: 4.1 MMOL/L — SIGNIFICANT CHANGE UP (ref 3.5–5.3)
RBC # BLD: 2.86 M/UL — LOW (ref 4.2–5.8)
RBC # FLD: 14.7 % — HIGH (ref 10.3–14.5)
SODIUM SERPL-SCNC: 144 MMOL/L — SIGNIFICANT CHANGE UP (ref 135–145)
TRIGL SERPL-MCNC: 186 MG/DL — HIGH
WBC # BLD: 6.53 K/UL — SIGNIFICANT CHANGE UP (ref 3.8–10.5)
WBC # FLD AUTO: 6.53 K/UL — SIGNIFICANT CHANGE UP (ref 3.8–10.5)

## 2022-09-29 PROCEDURE — 70551 MRI BRAIN STEM W/O DYE: CPT | Mod: 26

## 2022-09-29 PROCEDURE — 99222 1ST HOSP IP/OBS MODERATE 55: CPT

## 2022-09-29 PROCEDURE — 99232 SBSQ HOSP IP/OBS MODERATE 35: CPT | Mod: GC

## 2022-09-29 RX ORDER — PANTOPRAZOLE SODIUM 20 MG/1
40 TABLET, DELAYED RELEASE ORAL
Refills: 0 | Status: DISCONTINUED | OUTPATIENT
Start: 2022-09-29 | End: 2022-09-30

## 2022-09-29 RX ORDER — ATORVASTATIN CALCIUM 80 MG/1
80 TABLET, FILM COATED ORAL AT BEDTIME
Refills: 0 | Status: DISCONTINUED | OUTPATIENT
Start: 2022-09-29 | End: 2022-09-30

## 2022-09-29 RX ADMIN — Medication 100 MILLIGRAM(S): at 11:10

## 2022-09-29 RX ADMIN — Medication 2: at 08:58

## 2022-09-29 RX ADMIN — HEPARIN SODIUM 5000 UNIT(S): 5000 INJECTION INTRAVENOUS; SUBCUTANEOUS at 21:34

## 2022-09-29 RX ADMIN — AMLODIPINE BESYLATE 10 MILLIGRAM(S): 2.5 TABLET ORAL at 21:34

## 2022-09-29 RX ADMIN — Medication 200 MILLIGRAM(S): at 21:34

## 2022-09-29 RX ADMIN — PANTOPRAZOLE SODIUM 40 MILLIGRAM(S): 20 TABLET, DELAYED RELEASE ORAL at 06:07

## 2022-09-29 RX ADMIN — Medication 81 MILLIGRAM(S): at 11:10

## 2022-09-29 RX ADMIN — Medication 1 PATCH: at 06:00

## 2022-09-29 RX ADMIN — ATORVASTATIN CALCIUM 80 MILLIGRAM(S): 80 TABLET, FILM COATED ORAL at 03:32

## 2022-09-29 RX ADMIN — CARVEDILOL PHOSPHATE 12.5 MILLIGRAM(S): 80 CAPSULE, EXTENDED RELEASE ORAL at 17:59

## 2022-09-29 RX ADMIN — Medication 200 MILLIGRAM(S): at 06:06

## 2022-09-29 RX ADMIN — ATORVASTATIN CALCIUM 80 MILLIGRAM(S): 80 TABLET, FILM COATED ORAL at 21:34

## 2022-09-29 RX ADMIN — Medication 4: at 21:41

## 2022-09-29 RX ADMIN — Medication 2: at 13:05

## 2022-09-29 RX ADMIN — GABAPENTIN 400 MILLIGRAM(S): 400 CAPSULE ORAL at 11:10

## 2022-09-29 RX ADMIN — CARVEDILOL PHOSPHATE 12.5 MILLIGRAM(S): 80 CAPSULE, EXTENDED RELEASE ORAL at 06:07

## 2022-09-29 RX ADMIN — Medication 200 MILLIGRAM(S): at 13:06

## 2022-09-29 RX ADMIN — Medication 4: at 17:59

## 2022-09-29 RX ADMIN — HEPARIN SODIUM 5000 UNIT(S): 5000 INJECTION INTRAVENOUS; SUBCUTANEOUS at 13:06

## 2022-09-29 RX ADMIN — HEPARIN SODIUM 5000 UNIT(S): 5000 INJECTION INTRAVENOUS; SUBCUTANEOUS at 06:07

## 2022-09-29 NOTE — PROGRESS NOTE ADULT - SUBJECTIVE AND OBJECTIVE BOX
PATIENT SEEN AND EXAMINED ON :- 9/29/22  DATE OF SERVICE:      9/29/22       Interim events noted,Labs ,Radiological studies and Cardiology tests reviewed .       HOSPITAL COURSE: HPI:  76 yo M pmh CAD s/p 1stent ([facility] 12/2021), HLD, GERD, gout, DM, HTN, a-fib on plavix presents complaining of sudden onset BRBPR this morning. Patient has had 4 episodes total today, 2 at home and two while in the ED, states that every time the blood has filled the toilet bowl. Denies any prodromal chest pain, shortness of breath, palpitations or sensations of irregular heart rate,, abdominal pain, cramping, nausea, vomiting, diarrhea blood in urine or stool prior to episodes today. No recent NSAID use, history of GI bleeds, personal or family history of cancer. Last colonoscopy 3 years ago showed polyps that required clips.  (24 Sep 2022 17:53)      INTERIM EVENTS:Patient seen at bedside ,interim events noted.      PMH -reviewed admission note, no change since admission  HEART FAILURE: Acute[ ]Chronic[ ] Systolic[ ] Diastolic[ ] Combined Systolic and Diastolic[ ]  CAD[ ] CABG[ ] PCI[ ]  DEVICES[ ] PPM[ ] ICD[ ] ILR[ ]  ATRIAL FIBRILLATION[ ] Paroxysmal[ ] Permanent[ ] CHADS2-[  ]  RADHA[ ] CKD1[ ] CKD2[ ] CKD3[ ] CKD4[ ] ESRD[ ]  COPD[ ] HTN[ ]   DM[ ] Type1[ ] Type 2[ ]   CVA[ ] Paresis[ ]    AMBULATION: Assisted[ ] Cane/walker[ ] Independent[ ]    MEDICATIONS  (STANDING):  allopurinol 100 milliGRAM(s) Oral daily  amLODIPine   Tablet 10 milliGRAM(s) Oral at bedtime  aspirin  chewable 81 milliGRAM(s) Oral daily  atorvastatin 80 milliGRAM(s) Oral at bedtime  carvedilol 12.5 milliGRAM(s) Oral every 12 hours  cloNIDine Patch 0.3 mG/24Hr(s) 1 patch Transdermal every 7 days  gabapentin 400 milliGRAM(s) Oral daily  heparin   Injectable 5000 Unit(s) SubCutaneous every 8 hours  insulin lispro (ADMELOG) corrective regimen sliding scale   SubCutaneous Before meals and at bedtime  labetalol 200 milliGRAM(s) Oral every 8 hours  pantoprazole    Tablet 40 milliGRAM(s) Oral before breakfast    MEDICATIONS  (PRN):  aluminum hydroxide/magnesium hydroxide/simethicone Suspension 30 milliLiter(s) Oral every 4 hours PRN Dyspepsia  melatonin 3 milliGRAM(s) Oral at bedtime PRN Insomnia  ondansetron Injectable 4 milliGRAM(s) IV Push every 8 hours PRN Nausea and/or Vomiting            REVIEW OF SYSTEMS:  Constitutional: [ ] fever, [ ]weight loss,  [ ]fatigue [ ]weight gain  Eyes: [ ] visual changes  Respiratory: [ ]shortness of breath;  [ ] cough, [ ]wheezing, [ ]chills, [ ]hemoptysis  Cardiovascular: [ ] chest pain, [ ]palpitations, [ ]dizziness,  [ ]leg swelling[ ]orthopnea[ ]PND  Gastrointestinal: [ ] abdominal pain, [ ]nausea, [ ]vomiting,  [ ]diarrhea [ ]Constipation [ ]Melena  Genitourinary: [ ] dysuria, [ ] hematuria [ ]Dobbs  Neurologic: [ ] headaches [ ] tremors[ ]weakness [ ]Paralysis Right[ ] Left[ ]  Skin: [ ] itching, [ ]burning, [ ] rashes  Endocrine: [ ] heat or cold intolerance  Musculoskeletal: [ ] joint pain or swelling; [ ] muscle, back, or extremity pain  Psychiatric: [ ] depression, [ ]anxiety, [ ]mood swings, or [ ]difficulty sleeping  Hematologic: [ ] easy bruising, [ ] bleeding gums    [ ] All remaining systems negative except as per above.   [ ]Unable to obtain.  [x] No change in ROS since admission      Vital Signs Last 24 Hrs  T(C): 36.6 (29 Sep 2022 20:13), Max: 36.9 (29 Sep 2022 09:13)  T(F): 97.9 (29 Sep 2022 20:13), Max: 98.4 (29 Sep 2022 09:13)  HR: 66 (29 Sep 2022 20:13) (61 - 69)  BP: 180/69 (29 Sep 2022 20:13) (159/57 - 180/69)  BP(mean): --  RR: 18 (29 Sep 2022 20:13) (17 - 18)  SpO2: 100% (29 Sep 2022 20:13) (100% - 100%)    Parameters below as of 29 Sep 2022 20:13  Patient On (Oxygen Delivery Method): room air      I&O's Summary    28 Sep 2022 07:01  -  29 Sep 2022 07:00  --------------------------------------------------------  IN: 1300 mL / OUT: 600 mL / NET: 700 mL    29 Sep 2022 07:01  -  29 Sep 2022 20:37  --------------------------------------------------------  IN: 720 mL / OUT: 400 mL / NET: 320 mL        PHYSICAL EXAM:  General: No acute distress BMI-  HEENT: EOMI, PERRL  Neck: Supple, [ ] JVD  Lungs: Equal air entry bilaterally; [ ] rales [ ] wheezing [ ] rhonchi  Heart: Regular rate and rhythm; [x ] murmur   2/6 [ x] systolic [ ] diastolic [ ] radiation[ ] rubs [ ]  gallops  Abdomen: Nontender, bowel sounds present  Extremities: No clubbing, cyanosis, [ ] edema [ ]Pulses  equal and intact  Nervous system:  Alert & Oriented X3, no focal deficits  Psychiatric: Normal affect  Skin: No rashes or lesions    LABS:  09-29    144  |  113<H>  |  46<H>  ----------------------------<  112<H>  4.1   |  18<L>  |  4.16<H>    Ca    8.7      29 Sep 2022 05:29  Phos  3.6     09-29  Mg     2.00     09-29      Creatinine Trend: 4.16<--, 4.26<--, 4.44<--, 4.44<--, 4.38<--, 3.90<--                        8.2    6.53  )-----------( 207      ( 29 Sep 2022 05:29 )             24.4     PT/INR - ( 28 Sep 2022 07:10 )   PT: 10.9 sec;   INR: 0.94 ratio         PTT - ( 28 Sep 2022 07:10 )  PTT:28.2 sec

## 2022-09-29 NOTE — PROGRESS NOTE ADULT - ASSESSMENT
77y Male, SICU consulted for GI bleed. Patient s/p 3u PRBCs, 1u plts, 1u ffp. Medical team concerned that patient unable to be sufficiently transfused on floor. Hemoglobin is not stable. Will not list this patient for floor. Will undergo colonoscopy tomorrow.     PLAN:   Neurologic:   - Patient states is feeling well w/o pain.  - tylenol prn, gabapentin daily    Respiratory:  - Continue to monitor  - 02 sat >96% on RA     Cardiovascular:   - Patient currently normotensive to hypertensive.  - Continue labetalol, clonidine, amlodipine and coreg    Gastrointestinal/Nutrition:   - - S/p Colonoscopy 9/27  - s/p 2 units PRBCs  -  /Renal  - Strict IO&O's    Hematologic:   - S/p 4u PRBcs, 1u plt, 1u ffp  - F/u TEG, as patient on aspirin/plavix at home  - Transfuse as needed    Endocrine:   - ISS

## 2022-09-29 NOTE — PROGRESS NOTE ADULT - SUBJECTIVE AND OBJECTIVE BOX
TEAM Surgery Progress Note  Patient is a 77y old  Male who presents with a chief complaint of BRBPR (28 Sep 2022 10:38)      INTERVAL EVENTS: Patient is POD# ***No acute events overnight.  SUBJECTIVE: Patient seen and examined at bedside with surgical team, patient without complaints. Denies fever, chills, CP, SOB nausea, vomiting, abdominal pain.    REVIEW OF SYSTEMS:  Constitutional: No fevers or chills. No malaise or weakness.  EENT: No vision changes. No ear pain. No nasal congestion or rhinitis. No throat pain or dysphagia.  Respiratory: No cough, wheezing, or SOB. No hemoptysis.  Cardiovascular: No chest pain or palpitations.  Gastrointestinal: No abdominal pain. No nausea, vomiting, diarrhea or constipation. No hematochezia. No melena.  Genitourinary: No dysuria, hematuria, or frequency.  Neurologic: No numbness or tingling. No weakness.  Skin: No rashes or pruritus.     OBJECTIVE:    Vital Signs Last 24 Hrs  T(C): 36.8 (28 Sep 2022 20:09), Max: 36.9 (28 Sep 2022 09:15)  T(F): 98.3 (28 Sep 2022 20:09), Max: 98.5 (28 Sep 2022 17:51)  HR: 77 (28 Sep 2022 20:09) (70 - 87)  BP: 175/64 (28 Sep 2022 20:09) (158/61 - 179/76)  BP(mean): --  RR: 18 (28 Sep 2022 20:09) (17 - 18)  SpO2: 99% (28 Sep 2022 20:09) (98% - 100%)    Parameters below as of 28 Sep 2022 20:09  Patient On (Oxygen Delivery Method): room air    I&O's Detail    27 Sep 2022 07:01  -  28 Sep 2022 07:00  --------------------------------------------------------  IN:    Lactated Ringers: 600 mL    Oral Fluid: 2000 mL  Total IN: 2600 mL    OUT:    IV PiggyBack: 0 mL    Stool (mL): 1200 mL    Voided (mL): 1440 mL  Total OUT: 2640 mL    Total NET: -40 mL      28 Sep 2022 07:01  -  29 Sep 2022 00:57  --------------------------------------------------------  IN:    Oral Fluid: 300 mL    Sodium Chloride 0.9% Bolus: 1000 mL  Total IN: 1300 mL    OUT:    Voided (mL): 600 mL  Total OUT: 600 mL    Total NET: 700 mL      MEDICATIONS  (STANDING):  allopurinol 100 milliGRAM(s) Oral daily  amLODIPine   Tablet 10 milliGRAM(s) Oral at bedtime  aspirin  chewable 81 milliGRAM(s) Oral daily  atorvastatin 80 milliGRAM(s) Oral at bedtime  atorvastatin 20 milliGRAM(s) Oral at bedtime  carvedilol 12.5 milliGRAM(s) Oral every 12 hours  cloNIDine Patch 0.3 mG/24Hr(s) 1 patch Transdermal every 7 days  gabapentin 400 milliGRAM(s) Oral daily  heparin   Injectable 5000 Unit(s) SubCutaneous every 8 hours  insulin lispro (ADMELOG) corrective regimen sliding scale   SubCutaneous Before meals and at bedtime  labetalol 200 milliGRAM(s) Oral every 8 hours  pantoprazole  Injectable 40 milliGRAM(s) IV Push two times a day    MEDICATIONS  (PRN):  aluminum hydroxide/magnesium hydroxide/simethicone Suspension 30 milliLiter(s) Oral every 4 hours PRN Dyspepsia  melatonin 3 milliGRAM(s) Oral at bedtime PRN Insomnia  ondansetron Injectable 4 milliGRAM(s) IV Push every 8 hours PRN Nausea and/or Vomiting      PHYSICAL EXAM:  Constitutional: A&Ox3, NAD  Respiratory: Unlabored breathing  Abdomen: Soft, nondistended, NTTP. No rebound or guarding.  Extremities: WWP, ODEN spontaneously    LABS:                        8.2    6.11  )-----------( 190      ( 28 Sep 2022 12:55 )             24.7     09-28    144  |  112<H>  |  57<H>  ----------------------------<  148<H>  4.1   |  14<L>  |  4.26<H>    Ca    8.7      28 Sep 2022 07:10  Phos  3.9     09-28  Mg     2.10     09-28      PT/INR - ( 28 Sep 2022 07:10 )   PT: 10.9 sec;   INR: 0.94 ratio         PTT - ( 28 Sep 2022 07:10 )  PTT:28.2 sec          IMAGING:     TEAM Surgery Progress Note  Patient is a 77y old  Male who presents with a chief complaint of BRBPR (28 Sep 2022 10:38)      INTERVAL EVENTS:   CODE STROKE 9/28 AM for Flat Right sided smile and Upper + Lower Extremity weakness. CT non con showed no large vesselocclusion, No significant stenosis or vascular   abnormality.  Pt hemodynamically stable.   SUBJECTIVE: Patient seen and examined at bedside with surgical team, patient without complaints. Denies fever, chills, CP, SOB nausea, vomiting, abdominal pain.    REVIEW OF SYSTEMS:  Constitutional: No fevers or chills. No malaise or weakness.  EENT: No vision changes. No ear pain. No nasal congestion or rhinitis. No throat pain or dysphagia.  Respiratory: No cough, wheezing, or SOB. No hemoptysis.  Cardiovascular: No chest pain or palpitations.  Gastrointestinal: No abdominal pain. No nausea, vomiting, diarrhea or constipation. No hematochezia. No melena.  Genitourinary: No dysuria, hematuria, or frequency.  Neurologic: No numbness or tingling. No weakness.  Skin: No rashes or pruritus.     OBJECTIVE:    Vital Signs Last 24 Hrs  T(C): 36.8 (28 Sep 2022 20:09), Max: 36.9 (28 Sep 2022 09:15)  T(F): 98.3 (28 Sep 2022 20:09), Max: 98.5 (28 Sep 2022 17:51)  HR: 77 (28 Sep 2022 20:09) (70 - 87)  BP: 175/64 (28 Sep 2022 20:09) (158/61 - 179/76)  BP(mean): --  RR: 18 (28 Sep 2022 20:09) (17 - 18)  SpO2: 99% (28 Sep 2022 20:09) (98% - 100%)    Parameters below as of 28 Sep 2022 20:09  Patient On (Oxygen Delivery Method): room air    I&O's Detail    27 Sep 2022 07:01  -  28 Sep 2022 07:00  --------------------------------------------------------  IN:    Lactated Ringers: 600 mL    Oral Fluid: 2000 mL  Total IN: 2600 mL    OUT:    IV PiggyBack: 0 mL    Stool (mL): 1200 mL    Voided (mL): 1440 mL  Total OUT: 2640 mL    Total NET: -40 mL      28 Sep 2022 07:01  -  29 Sep 2022 00:57  --------------------------------------------------------  IN:    Oral Fluid: 300 mL    Sodium Chloride 0.9% Bolus: 1000 mL  Total IN: 1300 mL    OUT:    Voided (mL): 600 mL  Total OUT: 600 mL    Total NET: 700 mL      MEDICATIONS  (STANDING):  allopurinol 100 milliGRAM(s) Oral daily  amLODIPine   Tablet 10 milliGRAM(s) Oral at bedtime  aspirin  chewable 81 milliGRAM(s) Oral daily  atorvastatin 80 milliGRAM(s) Oral at bedtime  atorvastatin 20 milliGRAM(s) Oral at bedtime  carvedilol 12.5 milliGRAM(s) Oral every 12 hours  cloNIDine Patch 0.3 mG/24Hr(s) 1 patch Transdermal every 7 days  gabapentin 400 milliGRAM(s) Oral daily  heparin   Injectable 5000 Unit(s) SubCutaneous every 8 hours  insulin lispro (ADMELOG) corrective regimen sliding scale   SubCutaneous Before meals and at bedtime  labetalol 200 milliGRAM(s) Oral every 8 hours  pantoprazole  Injectable 40 milliGRAM(s) IV Push two times a day    MEDICATIONS  (PRN):  aluminum hydroxide/magnesium hydroxide/simethicone Suspension 30 milliLiter(s) Oral every 4 hours PRN Dyspepsia  melatonin 3 milliGRAM(s) Oral at bedtime PRN Insomnia  ondansetron Injectable 4 milliGRAM(s) IV Push every 8 hours PRN Nausea and/or Vomiting      PHYSICAL EXAM:    General: NAD, resting in bed comfortably.  Cardiac: regular rate, warm and well perfused  Respiratory: Nonlabored respirations, normal cw expansion.  Abdomen: soft, nontender, nondistended.    Extremities: normal strength, FROM, no deformities. No pronator drift    LABS:                        8.2    6.53  )-----------( 207      ( 29 Sep 2022 05:29 )             24.4       09-29    144  |  113<H>  |  46<H>  ----------------------------<  112<H>  4.1   |  18<L>  |  4.16<H>    Ca    8.7      29 Sep 2022 05:29  Phos  3.6     09-29  Mg     2.00     09-29        PT/INR - ( 28 Sep 2022 07:10 )   PT: 10.9 sec;   INR: 0.94 ratio         PTT - ( 28 Sep 2022 07:10 )  PTT:28.2 sec  CARDIAC MARKERS ( 28 Sep 2022 07:10 )  x     / x     / 170 U/L / x     / 6.0 ng/mL                  EKG:   CXR:     Imaging:     *** Wells Score  *** ABDI score: determines risk for ACS with unstable angina or NSTEMI  *** MELD-Na  *** Discriminant Function Score            IMAGING:

## 2022-09-29 NOTE — DISCHARGE NOTE PROVIDER - HOSPITAL COURSE
78 yo M pmh CAD s/p 1stent ([facility] 12/2021), HLD, GERD, gout, DM, HTN, ?a-fib (patient denies), on plavix (last dose 9/22, also on aspirin) presents complaining of sudden onset BRBPR this morning. Surgery consulted for evaluation of lower GI bleed s/p colonoscopy revealing pandiverticulosis and one polyp without source of active bleeding. Hospital course complicated by CODE STROKE 9/28 AM. Currently hemodynamically stable 76 yo M pmh CAD s/p 1stent ([facility] 12/2021), HLD, GERD, gout, DM, HTN, ?a-fib (patient denies), on plavix (last dose 9/22, also on aspirin) presents complaining of sudden onset BRBPR this morning. Surgery consulted for evaluation of lower GI bleed s/p colonoscopy revealing pandiverticulosis and one polyp without source of active bleeding. Hospital course complicated by CODE STROKE 9/28 AM. Currently hemodynamically stable. CT HEAD/MRI performed revealing an acute infarct. Patient demonstrating improving symptoms with full strength intact. PT/OT/PM&R consulted recommending home with home PT rolling walker. Neurology recommending full dose AC when surgery/GI seem stable. Plan to start Eliquis 5mg BID in 2 weeks. Plan to resume Plavix (home dose) in 2 weeks.     At the time of discharge, the patient was hemodynamically stable, was tolerating PO diet, was voiding urine and passing stool, was ambulating, and was comfortable with adequate pain control. The patient was instructed to follow up with Dr. Catalan within 1-2 weeks after discharge from the hospital. The patient/family felt comfortable with discharge. The patient had no other issues.     ACC: [id number] EXAM:  MR BRAIN                          *** ADDENDUM***    In addition to the region of infarct in the left corona radiata which is   acute there are more subtle foci of diffusion hyperintensity with   restricted diffusion on the ADC map one in the left lentiform nucleus,   one in the right posterior temporal region and one in the left cerebellum   all with subtle but identifiable restricted diffusion on the ADC map   suggesting areas of recent ischemia/infarct.      IMPRESSION: Diffusion demonstrates additional foci of punctate signal   hyperintensity in the left lentiform, right temporal and left cerebellar   regions with restricted diffusion on the ADC map consistent with punctate   areas of recent ischemia/infarct in various vascular territories.    --- End of Report ---    *** END OF ADDENDUM***

## 2022-09-29 NOTE — CONSULT NOTE ADULT - SUBJECTIVE AND OBJECTIVE BOX
Patient is a 77y old  Male who presents with a chief complaint of BRBPR (29 Sep 2022 00:56)      HPI:  78 yo M pmh CAD s/p 1stent ([facility] 12/2021), HLD, GERD, gout, DM, HTN, a-fib on plavix presents complaining of sudden onset BRBPR this morning. Patient has had 4 episodes total today, 2 at home and two while in the ED, states that every time the blood has filled the toilet bowl. Denies any prodromal chest pain, shortness of breath, palpitations or sensations of irregular heart rate,, abdominal pain, cramping, nausea, vomiting, diarrhea blood in urine or stool prior to episodes today. No recent NSAID use, history of GI bleeds, personal or family history of cancer. Last colonoscopy 3 years ago showed polyps that required clips.  (24 Sep 2022 17:53)      REVIEW OF SYSTEMS   dysarthria  R sided weakness    PAST MEDICAL & SURGICAL HISTORY  DM (diabetes mellitus)    Gout    HTN (hypertension)    BPH (benign prostatic hyperplasia)    H/O eye surgery        SOCIAL HISTORY  Smoking - Denied  EtOH - Denied   Drugs - Denied    FUNCTIONAL HISTORY  Lives with spouse in apartment without stairs (+elevator)  Independent at baseline    CURRENT FUNCTIONAL STATUS  9/28  Bed Mobility: Sit to Supine:     · Level of [place]	contact guard  · Physical Assist/Nonphysical Assist	verbal cues; nonverbal cues (demo/gestures); 1 person assist    Bed Mobility: Supine to Sit:     · Level of [place]	contact guard  · Physical Assist/Nonphysical Assist	verbal cues; nonverbal cues (demo/gestures); 1 person assist    Bed Mobility Analysis:     · Bed Mobility Limitations	impaired ability to control trunk for mobility; decreased ability to use legs for bridging/pushing  · Impairments Contributing to Impaired Bed Mobility	impaired balance; impaired postural control; decreased strength    Transfer: Sit to Stand:     · Level of [place]	contact guard  · Physical Assist/Nonphysical Assist	verbal cues; nonverbal cues (demo/gestures); 1 person assist  · Weight-Bearing Restrictions	full weight-bearing  · Assistive Device	rolling walker    Transfer: Stand to Sit:     · Level of [place]	contact guard  · Physical Assist/Nonphysical Assist	verbal cues; nonverbal cues (demo/gestures); 1 person assist  · Weight-Bearing Restrictions	full weight-bearing  · Assistive Device	rolling walker    Sit/Stand Transfer Safety Analysis:     · Transfer Safety Concerns Noted	decreased step length  · Impairments Contributing to Impaired Transfers	impaired balance; impaired postural control; decreased strength    Gait Skills:     · Level of [place]	contact guard; minimum assist (75% patients effort)  · Physical Assist/Nonphysical Assist	verbal cues; nonverbal cues (demo/gestures); 1 person assist  · Weight-Bearing Restrictions	full weight-bearing  · Assistive Device	rolling walker  · Gait Distance	10 feet      FAMILY HISTORY   Family history of diabetes mellitus (Mother)    Family history of hypertension (Mother)        RECENT LABS/IMAGING  CBC Full  -  ( 29 Sep 2022 05:29 )  WBC Count : 6.53 K/uL  RBC Count : 2.86 M/uL  Hemoglobin : 8.2 g/dL  Hematocrit : 24.4 %  Platelet Count - Automated : 207 K/uL  Mean Cell Volume : 85.3 fL  Mean Cell Hemoglobin : 28.7 pg  Mean Cell Hemoglobin Concentration : 33.6 gm/dL  Auto Neutrophil # : x  Auto Lymphocyte # : x  Auto Monocyte # : x  Auto Eosinophil # : x  Auto Basophil # : x  Auto Neutrophil % : x  Auto Lymphocyte % : x  Auto Monocyte % : x  Auto Eosinophil % : x  Auto Basophil % : x    09-29    144  |  113<H>  |  46<H>  ----------------------------<  112<H>  4.1   |  18<L>  |  4.16<H>    Ca    8.7      29 Sep 2022 05:29  Phos  3.6     09-29  Mg     2.00     09-29          VITALS  T(C): 36.9 (09-29-22 @ 09:13), Max: 36.9 (09-28-22 @ 17:51)  HR: 69 (09-29-22 @ 09:13) (65 - 77)  BP: 163/64 (09-29-22 @ 09:13) (159/57 - 176/60)  RR: 18 (09-29-22 @ 09:13) (18 - 18)  SpO2: 100% (09-29-22 @ 09:13) (98% - 100%)  Wt(kg): --    ALLERGIES  No Known Allergies      MEDICATIONS   allopurinol 100 milliGRAM(s) Oral daily  aluminum hydroxide/magnesium hydroxide/simethicone Suspension 30 milliLiter(s) Oral every 4 hours PRN  amLODIPine   Tablet 10 milliGRAM(s) Oral at bedtime  aspirin  chewable 81 milliGRAM(s) Oral daily  atorvastatin 20 milliGRAM(s) Oral at bedtime  carvedilol 12.5 milliGRAM(s) Oral every 12 hours  cloNIDine Patch 0.3 mG/24Hr(s) 1 patch Transdermal every 7 days  gabapentin 400 milliGRAM(s) Oral daily  heparin   Injectable 5000 Unit(s) SubCutaneous every 8 hours  insulin lispro (ADMELOG) corrective regimen sliding scale   SubCutaneous Before meals and at bedtime  labetalol 200 milliGRAM(s) Oral every 8 hours  melatonin 3 milliGRAM(s) Oral at bedtime PRN  ondansetron Injectable 4 milliGRAM(s) IV Push every 8 hours PRN  pantoprazole  Injectable 40 milliGRAM(s) IV Push two times a day      ----------------------------------------------------------------------------------------  PHYSICAL EXAM  Constitutional - NAD, Comfortable  HEENT - NCAT, EOMI  Neck - Supple, No limited ROM  Chest - CTA bilaterally, No wheeze, No rhonchi, No crackles  Cardiovascular - RRR, S1S2, No murmurs  Abdomen - BS+, Soft, NTND  Extremities - No C/C/E, No calf tenderness   Neurologic Exam -                    Cognitive - Awake, Alert, AAO to self, place, date, year, situation     Communication - Fluent, No dysarthria     Cranial Nerves - CN 2-12 intact     Motor - No focal deficits                    LEFT    UE - ShAB 5/5, EF 5/5, EE 5/5, WE 5/5,  5/5                    RIGHT UE - ShAB 5/5, EF 5/5, EE 5/5, WE 5/5,  5/5                    LEFT    LE - HF 5/5, KE 5/5, DF 5/5, PF 5/5                    RIGHT LE - HF 5/5, KE 5/5, DF 5/5, PF 5/5        Sensory - Intact to LT     Reflexes - DTR Intact, No primitive reflexive     Coordination - FTN intact     OculoVestibular - No saccades, No nystagmus, VOR         Balance - WNL Static  Psychiatric - Mood stable, Affect WNL  ----------------------------------------------------------------------------------------  ASSESSMENT/PLAN  78 yo m p/w dysarthria and R sided weakness  MRI head pending   Pain -gabapentin  diet: consistent carb w evening snack high fiber, dash/tlc  DVT PPX - heparin  Rehab -      incomplete note, consult in progress Patient is a 77y old  Male who presents with a chief complaint of BRBPR (29 Sep 2022 00:56)      HPI:  76 yo M pmh CAD s/p 1stent ([facility] 12/2021), HLD, GERD, gout, DM, HTN, a-fib on plavix presents complaining of sudden onset BRBPR this morning. Patient has had 4 episodes total today, 2 at home and two while in the ED, states that every time the blood has filled the toilet bowl. Denies any prodromal chest pain, shortness of breath, palpitations or sensations of irregular heart rate,, abdominal pain, cramping, nausea, vomiting, diarrhea blood in urine or stool prior to episodes today. No recent NSAID use, history of GI bleeds, personal or family history of cancer. Last colonoscopy 3 years ago showed polyps that required clips.  (24 Sep 2022 17:53)    patient pending MRI, however reports strength and speech improving.    REVIEW OF SYSTEMS   trace dysarthria  trace R sided weakness    PAST MEDICAL & SURGICAL HISTORY  DM (diabetes mellitus)    Gout    HTN (hypertension)    BPH (benign prostatic hyperplasia)    H/O eye surgery        SOCIAL HISTORY  Smoking - Denied  EtOH - Denied   Drugs - Denied    FUNCTIONAL HISTORY  Lives with spouse in apartment without stairs (+elevator)  Independent at baseline    CURRENT FUNCTIONAL STATUS  9/28  Bed Mobility: Sit to Supine:     · Level of [place]	contact guard  · Physical Assist/Nonphysical Assist	verbal cues; nonverbal cues (demo/gestures); 1 person assist    Bed Mobility: Supine to Sit:     · Level of [place]	contact guard  · Physical Assist/Nonphysical Assist	verbal cues; nonverbal cues (demo/gestures); 1 person assist    Bed Mobility Analysis:     · Bed Mobility Limitations	impaired ability to control trunk for mobility; decreased ability to use legs for bridging/pushing  · Impairments Contributing to Impaired Bed Mobility	impaired balance; impaired postural control; decreased strength    Transfer: Sit to Stand:     · Level of [place]	contact guard  · Physical Assist/Nonphysical Assist	verbal cues; nonverbal cues (demo/gestures); 1 person assist  · Weight-Bearing Restrictions	full weight-bearing  · Assistive Device	rolling walker    Transfer: Stand to Sit:     · Level of [place]	contact guard  · Physical Assist/Nonphysical Assist	verbal cues; nonverbal cues (demo/gestures); 1 person assist  · Weight-Bearing Restrictions	full weight-bearing  · Assistive Device	rolling walker    Sit/Stand Transfer Safety Analysis:     · Transfer Safety Concerns Noted	decreased step length  · Impairments Contributing to Impaired Transfers	impaired balance; impaired postural control; decreased strength    Gait Skills:     · Level of [place]	contact guard; minimum assist (75% patients effort)  · Physical Assist/Nonphysical Assist	verbal cues; nonverbal cues (demo/gestures); 1 person assist  · Weight-Bearing Restrictions	full weight-bearing  · Assistive Device	rolling walker  · Gait Distance	10 feet      FAMILY HISTORY   Family history of diabetes mellitus (Mother)    Family history of hypertension (Mother)        RECENT LABS/IMAGING  CBC Full  -  ( 29 Sep 2022 05:29 )  WBC Count : 6.53 K/uL  RBC Count : 2.86 M/uL  Hemoglobin : 8.2 g/dL  Hematocrit : 24.4 %  Platelet Count - Automated : 207 K/uL  Mean Cell Volume : 85.3 fL  Mean Cell Hemoglobin : 28.7 pg  Mean Cell Hemoglobin Concentration : 33.6 gm/dL  Auto Neutrophil # : x  Auto Lymphocyte # : x  Auto Monocyte # : x  Auto Eosinophil # : x  Auto Basophil # : x  Auto Neutrophil % : x  Auto Lymphocyte % : x  Auto Monocyte % : x  Auto Eosinophil % : x  Auto Basophil % : x    09-29    144  |  113<H>  |  46<H>  ----------------------------<  112<H>  4.1   |  18<L>  |  4.16<H>    Ca    8.7      29 Sep 2022 05:29  Phos  3.6     09-29  Mg     2.00     09-29          VITALS  T(C): 36.9 (09-29-22 @ 09:13), Max: 36.9 (09-28-22 @ 17:51)  HR: 69 (09-29-22 @ 09:13) (65 - 77)  BP: 163/64 (09-29-22 @ 09:13) (159/57 - 176/60)  RR: 18 (09-29-22 @ 09:13) (18 - 18)  SpO2: 100% (09-29-22 @ 09:13) (98% - 100%)  Wt(kg): --    ALLERGIES  No Known Allergies      MEDICATIONS   allopurinol 100 milliGRAM(s) Oral daily  aluminum hydroxide/magnesium hydroxide/simethicone Suspension 30 milliLiter(s) Oral every 4 hours PRN  amLODIPine   Tablet 10 milliGRAM(s) Oral at bedtime  aspirin  chewable 81 milliGRAM(s) Oral daily  atorvastatin 20 milliGRAM(s) Oral at bedtime  carvedilol 12.5 milliGRAM(s) Oral every 12 hours  cloNIDine Patch 0.3 mG/24Hr(s) 1 patch Transdermal every 7 days  gabapentin 400 milliGRAM(s) Oral daily  heparin   Injectable 5000 Unit(s) SubCutaneous every 8 hours  insulin lispro (ADMELOG) corrective regimen sliding scale   SubCutaneous Before meals and at bedtime  labetalol 200 milliGRAM(s) Oral every 8 hours  melatonin 3 milliGRAM(s) Oral at bedtime PRN  ondansetron Injectable 4 milliGRAM(s) IV Push every 8 hours PRN  pantoprazole  Injectable 40 milliGRAM(s) IV Push two times a day      ----------------------------------------------------------------------------------------  PHYSICAL EXAM  Constitutional - NAD, Comfortable  HEENT - NCAT, EOMI  Neck - Supple, No limited ROM  Chest - no respiratory distress  Cardiovascular - RRR, S1S2   Abdomen - BS+, Soft, NTND  Extremities - No C/C/E, No calf tenderness   Neurologic Exam -                    Cognitive - Awake, Alert, AAO to self, place, date, year, situation     Communication - Fluent, trace dysarthria     Cranial Nerves - CN 2-12 intact     Motor -                      LEFT    UE - ShAB 5/5, EF 5/5, EE 5/5, WE 5/5,  5/5                    RIGHT UE - ShAB 5/5, EF 5/5, EE 5/5, WE 5/5,  5-/5                    LEFT    LE - HF 5/5, KE 5/5, DF 5/5, PF 5/5                    RIGHT LE - HF 5-/5, KE 5-/5, DF 5/5, PF 5/5        Sensory - Intact to LT      OculoVestibular - No saccades, No nystagmus, VOR         Balance - WNL Static  Psychiatric - Mood stable, Affect WNL  ----------------------------------------------------------------------------------------  ASSESSMENT/PLAN  76 yo m p/w dysarthria and R sided weakness  MRI head pending   Pain -gabapentin  diet: consistent carb w evening snack high fiber, dash/tlc  DVT PPX - heparin   continue bedside PT and OT during admission  Rehab -  anticipate home with outpatient SLP and PT. Recommend follow up PT session to assess for assistive device and longer distance ambulation.  Discussed with patient, who states his goal is for home discharge and feels he has been improving.

## 2022-09-29 NOTE — PROGRESS NOTE ADULT - SUBJECTIVE AND OBJECTIVE BOX
SUBJECTIVE/INTERVAL HISTORY:    NAEON. The patient has not had a bowel movement so does not know the color of his feces. Otherwise, denies nausea and vomiting.     PAST MEDICAL & SURGICAL HISTORY:  DM (diabetes mellitus)      Gout      HTN (hypertension)      BPH (benign prostatic hyperplasia)      H/O eye surgery        FAMILY HISTORY:  Family history of diabetes mellitus (Mother)    Family history of hypertension (Mother)        MEDICATIONS (HOME):  Home Medications:  allopurinol 100 mg oral tablet: 1 tab(s) orally once a day (24 Sep 2022 20:44)  amlodipine-benazepril 10 mg-40 mg oral capsule: 1 cap(s) orally once a day (24 Sep 2022 20:41)  atorvastatin 20 mg oral tablet: 1 tab(s) orally once a day (at bedtime) (03 Jul 2015 13:00)  Coreg 12.5 mg oral tablet: 1 tab(s) orally 2 times a day (24 Sep 2022 20:44)  gabapentin 400 mg oral capsule: 1 cap(s) orally once a day (24 Sep 2022 20:39)  gemfibrozil 600 mg oral tablet: 1 tab(s) orally 3 times a day (24 Sep 2022 20:40)  labetalol 200 mg oral tablet: 1 tab(s) orally 3 times a day (24 Sep 2022 20:39)  Ozempic 2 mg/1.5 mL (0.25 mg or 0.5 mg dose) subcutaneous solution:  (24 Sep 2022 20:41)  Plavix 75 mg oral tablet: 1 tab(s) orally once a day (24 Sep 2022 20:40)  Tresiba 100 units/mL subcutaneous solution: 80 unit(s) subcutaneous (24 Sep 2022 20:42)    MEDICATIONS  (STANDING):  allopurinol 100 milliGRAM(s) Oral daily  amLODIPine   Tablet 10 milliGRAM(s) Oral at bedtime  aspirin  chewable 81 milliGRAM(s) Oral daily  atorvastatin 20 milliGRAM(s) Oral at bedtime  carvedilol 12.5 milliGRAM(s) Oral every 12 hours  cloNIDine Patch 0.3 mG/24Hr(s) 1 patch Transdermal every 7 days  gabapentin 400 milliGRAM(s) Oral daily  heparin   Injectable 5000 Unit(s) SubCutaneous every 8 hours  insulin lispro (ADMELOG) corrective regimen sliding scale   SubCutaneous Before meals and at bedtime  labetalol 200 milliGRAM(s) Oral every 8 hours  pantoprazole    Tablet 40 milliGRAM(s) Oral before breakfast    MEDICATIONS  (PRN):  aluminum hydroxide/magnesium hydroxide/simethicone Suspension 30 milliLiter(s) Oral every 4 hours PRN Dyspepsia  melatonin 3 milliGRAM(s) Oral at bedtime PRN Insomnia  ondansetron Injectable 4 milliGRAM(s) IV Push every 8 hours PRN Nausea and/or Vomiting    ALLERGIES/INTOLERANCES:  Allergies  No Known Allergies    Intolerances    VITALS & EXAMINATION:  Vital Signs Last 24 Hrs  T(C): 36.6 (29 Sep 2022 13:06), Max: 36.9 (28 Sep 2022 17:51)  T(F): 97.8 (29 Sep 2022 13:06), Max: 98.5 (28 Sep 2022 17:51)  HR: 61 (29 Sep 2022 13:06) (61 - 77)  BP: 168/66 (29 Sep 2022 13:06) (159/57 - 176/60)  BP(mean): --  RR: 18 (29 Sep 2022 13:06) (18 - 18)  SpO2: 100% (29 Sep 2022 13:06) (98% - 100%)    Parameters below as of 29 Sep 2022 13:06  Patient On (Oxygen Delivery Method): room air        General:  Constitutional: Male, appears stated age, in no apparent distress including pain  Head: Normocephalic & Atraumatic.    Neurological:  MS: Eye open. Awake, alert, oriented to person, place, situation, time. Follows all commands.    Language: Speech is mild to moderately dysarthric, fluent with good repetition.    CNs: VFF. EOMI no nystagmus. V1-3 intact to LT b/l. Resolving R facial palsy, frontalis sparing.    Motor: Normal muscle bulk & tone. No noticeable tremor. Drift of RUE/RLE. No drift of LUE/LLE.               Deltoid	Biceps	Triceps	  Lumbricals  R	    5	   5	    5	 5	  5		 	  L	    5	   5	    5	 5	  5		    	H-Flex	H-ext  R	    5	     		   L	    5	    		     Sensation: Normal sensation to LT on RUE/RLE.     Cortical: Extinction on DSS (neglect): none    Reflexes:              Biceps(C5)       BR(C6)     Triceps(C7)               Patellar(L4)    Achilles(S1)      R	              2	          2	             2		    2		    0		     L	              2	          2             2	     2		    0		          Coordination: No dysmetria to FTN b/l     Gait: Deferred.     LABORATORY:  CBC                       8.2    6.53  )-----------( 207      ( 29 Sep 2022 05:29 )             24.4     Chem 09-29    144  |  113<H>  |  46<H>  ----------------------------<  112<H>  4.1   |  18<L>  |  4.16<H>    Ca    8.7      29 Sep 2022 05:29  Phos  3.6     09-29  Mg     2.00     09-29      LFTs   Coagulopathy PT/INR - ( 28 Sep 2022 07:10 )   PT: 10.9 sec;   INR: 0.94 ratio         PTT - ( 28 Sep 2022 07:10 )  PTT:28.2 sec  Lipid Panel 09-29 Chol 134 LDL -- HDL 38<L> Trig 186<H>  A1c   Cardiac enzymes CARDIAC MARKERS ( 28 Sep 2022 07:10 )  x     / x     / 170 U/L / x     / 6.0 ng/mL      U/A     STUDIES & IMAGING:  Studies (EKG, EEG, EMG, etc):     Radiology (XR, CT, MR, U/S, TTE/ALEXA): SUBJECTIVE/INTERVAL HISTORY:    NAEON. The patient has not had a bowel movement so does not know the color of his feces. Otherwise, denies nausea and vomiting.     PAST MEDICAL & SURGICAL HISTORY:  DM (diabetes mellitus)      Gout      HTN (hypertension)      BPH (benign prostatic hyperplasia)      H/O eye surgery        FAMILY HISTORY:  Family history of diabetes mellitus (Mother)    Family history of hypertension (Mother)        MEDICATIONS (HOME):  Home Medications:  allopurinol 100 mg oral tablet: 1 tab(s) orally once a day (24 Sep 2022 20:44)  amlodipine-benazepril 10 mg-40 mg oral capsule: 1 cap(s) orally once a day (24 Sep 2022 20:41)  atorvastatin 20 mg oral tablet: 1 tab(s) orally once a day (at bedtime) (03 Jul 2015 13:00)  Coreg 12.5 mg oral tablet: 1 tab(s) orally 2 times a day (24 Sep 2022 20:44)  gabapentin 400 mg oral capsule: 1 cap(s) orally once a day (24 Sep 2022 20:39)  gemfibrozil 600 mg oral tablet: 1 tab(s) orally 3 times a day (24 Sep 2022 20:40)  labetalol 200 mg oral tablet: 1 tab(s) orally 3 times a day (24 Sep 2022 20:39)  Ozempic 2 mg/1.5 mL (0.25 mg or 0.5 mg dose) subcutaneous solution:  (24 Sep 2022 20:41)  Plavix 75 mg oral tablet: 1 tab(s) orally once a day (24 Sep 2022 20:40)  Tresiba 100 units/mL subcutaneous solution: 80 unit(s) subcutaneous (24 Sep 2022 20:42)    MEDICATIONS  (STANDING):  allopurinol 100 milliGRAM(s) Oral daily  amLODIPine   Tablet 10 milliGRAM(s) Oral at bedtime  aspirin  chewable 81 milliGRAM(s) Oral daily  atorvastatin 20 milliGRAM(s) Oral at bedtime  carvedilol 12.5 milliGRAM(s) Oral every 12 hours  cloNIDine Patch 0.3 mG/24Hr(s) 1 patch Transdermal every 7 days  gabapentin 400 milliGRAM(s) Oral daily  heparin   Injectable 5000 Unit(s) SubCutaneous every 8 hours  insulin lispro (ADMELOG) corrective regimen sliding scale   SubCutaneous Before meals and at bedtime  labetalol 200 milliGRAM(s) Oral every 8 hours  pantoprazole    Tablet 40 milliGRAM(s) Oral before breakfast    MEDICATIONS  (PRN):  aluminum hydroxide/magnesium hydroxide/simethicone Suspension 30 milliLiter(s) Oral every 4 hours PRN Dyspepsia  melatonin 3 milliGRAM(s) Oral at bedtime PRN Insomnia  ondansetron Injectable 4 milliGRAM(s) IV Push every 8 hours PRN Nausea and/or Vomiting    ALLERGIES/INTOLERANCES:  Allergies  No Known Allergies    Intolerances    VITALS & EXAMINATION:  Vital Signs Last 24 Hrs  T(C): 36.6 (29 Sep 2022 13:06), Max: 36.9 (28 Sep 2022 17:51)  T(F): 97.8 (29 Sep 2022 13:06), Max: 98.5 (28 Sep 2022 17:51)  HR: 61 (29 Sep 2022 13:06) (61 - 77)  BP: 168/66 (29 Sep 2022 13:06) (159/57 - 176/60)  BP(mean): --  RR: 18 (29 Sep 2022 13:06) (18 - 18)  SpO2: 100% (29 Sep 2022 13:06) (98% - 100%)    Parameters below as of 29 Sep 2022 13:06  Patient On (Oxygen Delivery Method): room air        General:  Constitutional: Male, appears stated age, in no apparent distress including pain  Head: Normocephalic & Atraumatic.    Neurological:  MS: Eye open. Awake, alert, oriented to person, place, situation, time. Follows all commands.    Language: Speech is mild to moderately dysarthric, fluent with good repetition.    CNs: VFF. EOMI no nystagmus. V1-3 intact to LT b/l. Resolving R facial palsy, frontalis sparing.    Motor: Normal muscle bulk & tone. No noticeable tremor. Drift of RUE/RLE. No drift of LUE/LLE.     Strength testing            Deltoid      Biceps      Triceps     Wrist Extension    Wrist Flexion     Interossei         R            5                 5               5                     5                              5                        5                 5  L             5                 5               5                     5                              5                        5                 5              Hip Flexion    Hip Extension    Knee Flexion    Knee Extension    Dorsiflexion    Plantar Flexion  R              5                           5                       5                           5                            5                          5  L              5                           5                        5                           5                            5                          5    Sensation - Light touch intact in fingers and toes     DTR's -             Biceps      Triceps     Brachioradialis      Patellar    Ankle    Toes/plantar response  R             2+             2+                  2+                       2+            2+                 Down  L              2+             2+                 2+                        2+           2+                 Down  		     Sensation: Normal sensation to LT on RUE/RLE.     Cortical: Extinction on DSS (neglect): none    Reflexes:              Biceps(C5)       BR(C6)     Triceps(C7)               Patellar(L4)    Achilles(S1)      R	              2	          2	             2		    2		    0		     L	              2	          2             2	     2		    0		          Coordination: No dysmetria to FTN b/l     Gait: Deferred.     LABORATORY:  CBC                       8.2    6.53  )-----------( 207      ( 29 Sep 2022 05:29 )             24.4     Chem 09-29    144  |  113<H>  |  46<H>  ----------------------------<  112<H>  4.1   |  18<L>  |  4.16<H>    Ca    8.7      29 Sep 2022 05:29  Phos  3.6     09-29  Mg     2.00     09-29      LFTs   Coagulopathy PT/INR - ( 28 Sep 2022 07:10 )   PT: 10.9 sec;   INR: 0.94 ratio         PTT - ( 28 Sep 2022 07:10 )  PTT:28.2 sec  Lipid Panel 09-29 Chol 134 LDL -- HDL 38<L> Trig 186<H>  A1c   Cardiac enzymes CARDIAC MARKERS ( 28 Sep 2022 07:10 )  x     / x     / 170 U/L / x     / 6.0 ng/mL      U/A     STUDIES & IMAGING:  Studies (EKG, EEG, EMG, etc):     Radiology (XR, CT, MR, U/S, TTE/ALEXA):

## 2022-09-29 NOTE — CONSULT NOTE ADULT - CONSULT REASON
Code Stroke - new onset dysarthria & R hemiparesis
GI Bleed
GI bleed
Lower GI Bleed
Cardiac Evaluation DAPT with GIB
LGIB
eval for rehab

## 2022-09-29 NOTE — DISCHARGE NOTE PROVIDER - NSDCMRMEDTOKEN_GEN_ALL_CORE_FT
allopurinol 100 mg oral tablet: 1 tab(s) orally once a day  amlodipine-benazepril 10 mg-40 mg oral capsule: 1 cap(s) orally once a day  atorvastatin 20 mg oral tablet: 1 tab(s) orally once a day (at bedtime)  Coreg 12.5 mg oral tablet: 1 tab(s) orally 2 times a day  gabapentin 400 mg oral capsule: 1 cap(s) orally once a day  gemfibrozil 600 mg oral tablet: 1 tab(s) orally 3 times a day  labetalol 200 mg oral tablet: 1 tab(s) orally 3 times a day  Ozempic 2 mg/1.5 mL (0.25 mg or 0.5 mg dose) subcutaneous solution:   Plavix 75 mg oral tablet: 1 tab(s) orally once a day  Rolling Walker: 1 unit(s) parenteral once a day   Tresiba 100 units/mL subcutaneous solution: 80 unit(s) subcutaneous   allopurinol 100 mg oral tablet: 1 tab(s) orally once a day  amLODIPine 10 mg oral tablet: 1 tab(s) orally once a day (at bedtime)  amlodipine-benazepril 10 mg-40 mg oral capsule: 1 cap(s) orally once a day  aspirin 81 mg oral tablet, chewable: 1 tab(s) orally once a day  atorvastatin 20 mg oral tablet: 1 tab(s) orally once a day (at bedtime)  cloNIDine 0.3 mg/24 hr transdermal film, extended release: 1 patch transdermal every 7 days  Coreg 12.5 mg oral tablet: 1 tab(s) orally 2 times a day  gabapentin 400 mg oral capsule: 1 cap(s) orally once a day  gemfibrozil 600 mg oral tablet: 1 tab(s) orally 3 times a day  labetalol 200 mg oral tablet: 1 tab(s) orally 3 times a day  Ozempic 2 mg/1.5 mL (0.25 mg or 0.5 mg dose) subcutaneous solution:   pantoprazole 40 mg oral delayed release tablet: 1 tab(s) orally once a day (before a meal)  Plavix 75 mg oral tablet: 1 tab(s) orally once a day  Rolling Walker: 1 unit(s) parenteral once a day   Tresiba 100 units/mL subcutaneous solution: 80 unit(s) subcutaneous   allopurinol 100 mg oral tablet: 1 tab(s) orally once a day  amLODIPine 10 mg oral tablet: 1 tab(s) orally once a day (at bedtime)  amlodipine-benazepril 10 mg-40 mg oral capsule: 1 cap(s) orally once a day  aspirin 81 mg oral tablet, chewable: 1 tab(s) orally once a day  atorvastatin 80 mg oral tablet: 1 tab(s) orally once a day (at bedtime)   cloNIDine 0.3 mg/24 hr transdermal film, extended release: 1 patch transdermal every 7 days  Coreg 12.5 mg oral tablet: 1 tab(s) orally 2 times a day  gabapentin 400 mg oral capsule: 1 cap(s) orally once a day  gemfibrozil 600 mg oral tablet: 1 tab(s) orally 3 times a day  labetalol 200 mg oral tablet: 1 tab(s) orally 3 times a day  Ozempic 2 mg/1.5 mL (0.25 mg or 0.5 mg dose) subcutaneous solution:   pantoprazole 40 mg oral delayed release tablet: 1 tab(s) orally once a day (before a meal)  Plavix 75 mg oral tablet: 1 tab(s) orally once a day  Rolling Walker: 1 unit(s) parenteral once a day    allopurinol 100 mg oral tablet: 1 tab(s) orally once a day  amLODIPine 10 mg oral tablet: 1 tab(s) orally once a day (at bedtime)  amlodipine-benazepril 10 mg-40 mg oral capsule: 1 cap(s) orally once a day  aspirin 81 mg oral tablet, chewable: 1 tab(s) orally once a day  atorvastatin 80 mg oral tablet: 1 tab(s) orally once a day (at bedtime)   cloNIDine 0.3 mg/24 hr transdermal film, extended release: 1 patch transdermal every 7 days  Coreg 12.5 mg oral tablet: 1 tab(s) orally 2 times a day  gabapentin 400 mg oral capsule: 1 cap(s) orally once a day  gemfibrozil 600 mg oral tablet: 1 tab(s) orally 3 times a day  labetalol 200 mg oral tablet: 1 tab(s) orally 3 times a day  Ozempic 2 mg/1.5 mL (0.25 mg or 0.5 mg dose) subcutaneous solution:   pantoprazole 40 mg oral delayed release tablet: 1 tab(s) orally once a day (before a meal)  Rolling Walker: 1 unit(s) parenteral once a day

## 2022-09-29 NOTE — DISCHARGE NOTE PROVIDER - PROVIDER TOKENS
PROVIDER:[TOKEN:[[id number]],FOLLOWUP:[1 week]] PROVIDER:[TOKEN:[[id number]],FOLLOWUP:[1 week]],PROVIDER:[TOKEN:[[id number]],FOLLOWUP:[2 weeks]]

## 2022-09-29 NOTE — PROGRESS NOTE ADULT - ASSESSMENT
ASSESSMENT: 76 yo M pmh CAD s/p 1stent ([facility] 12/2021), HLD, GERD, gout, DM, HTN, ?a-fib (patient denies), on plavix (last dose 9/22, also on aspirin) presents complaining of sudden onset BRBPR this morning. Surgery consulted for evaluation of lower GI bleed s/p colonoscopy revealing pandiverticulosis and one polyp without source of active bleeding. Hospital course complicated by CODE STROKE 9/28 AM.    PLAN:     - fu Neuro Stroke recommendations: MRI Brain w/o, TTE, telemetry, neuro checks q4 hrs, ASA 81mg/SQH dvt ppx, Atorvastatin 80mg daily, dysphagia screen, permissive hypertension up to 220/120 x24 hours  - Regular diet (high fiber)  - OOB/Ambulate    General Surgery  B Team  [id number]   ASSESSMENT: 76 yo M pmh CAD s/p 1stent ([facility] 12/2021), HLD, GERD, gout, DM, HTN, ?a-fib (patient denies), on plavix (last dose 9/22, also on aspirin) presents complaining of sudden onset BRBPR this morning. Surgery consulted for evaluation of lower GI bleed s/p colonoscopy revealing pandiverticulosis and one polyp without source of active bleeding. Hospital course complicated by CODE STROKE 9/28 AM. Currently hemodynamically stable    PLAN:     - fu Neuro Stroke recommendations: MRI Brain w/o, TTE, telemetry, neuro checks q4 hrs, ASA 81mg/SQH dvt ppx, Atorvastatin 80mg daily, dysphagia screen, permissive hypertension up to 220/120 x24 hours  -TTE- No concern for clot  - Regular diet (high fiber)  - OOB/Ambulate  - OT/PT- Recommended acute inpatient rehab  - Discharge pending PMNR recs.        General Surgery  B Team  [id number]   ASSESSMENT: 78 yo M pmh CAD s/p 1stent ([facility] 12/2021), HLD, GERD, gout, DM, HTN, ?a-fib (patient denies), on plavix (last dose 9/22, also on aspirin) presents complaining of sudden onset BRBPR this morning. Surgery consulted for evaluation of lower GI bleed s/p colonoscopy revealing pandiverticulosis and one polyp without source of active bleeding. Hospital course complicated by CODE STROKE 9/28 AM. Currently hemodynamically stable    PLAN:     - fu Neuro Stroke recommendations: MRI Brain w/o, TTE, telemetry, neuro checks q4 hrs, ASA 81mg/SQH dvt ppx, Atorvastatin 80mg daily, dysphagia screen, permissive hypertension up to 220/120 x24 hours  -TTE- No concern for clot  - Regular diet (high fiber)  - OOB/Ambulate  - OT/PT- Recommended acute inpatient rehab  - Plavix to be restarted in 2 weeks   - Discharge pending PMNR recs.        General Surgery  B Team  [id number]

## 2022-09-29 NOTE — DISCHARGE NOTE PROVIDER - NSDCFUADDAPPT_GEN_ALL_CORE_FT
** Please call Neuro - STROKE @ [phone] to follow up with [facility] NP Tiki Mcihele in the outpatient setting. Please call to schedule an appointment.    Please follow up with your Primary Care Provider regarding your recent diagnosis of acute diverticular bleed and an acute stroke. Neurology recommended "Given history of atrial fibrillation, patient should be started on Apixaban when cleared by GI and/or surgery. Dose by weight, kidney function, and age. Should be approx 5 mg BID, until age 79; then 2.5mg BID thereafter". Surgery is recommended to start Eliquis in 2 weeks (9/14/22).

## 2022-09-29 NOTE — DISCHARGE NOTE PROVIDER - NSDCCPCAREPLAN_GEN_ALL_CORE_FT
PRINCIPAL DISCHARGE DIAGNOSIS  Diagnosis: GI bleed  Assessment and Plan of Treatment: pt had colonoscopy showing pan diverticulosis without active bleeding       PRINCIPAL DISCHARGE DIAGNOSIS  Diagnosis: GI bleed  Assessment and Plan of Treatment: pt had colonoscopy showing pan diverticulosis without active bleeding  Please follow up with Gastroenterology within 2 weeks of discharge.  DIET: HIGH FIBER DIET  NOTIFY YOUR SURGEON IF YOU HAVE: any bleeding that does not stop, any pus draining from your wound(s), any fever (over 100.4 F) persistent nausea/vomiting, or if your pain is not controlled on your discharge pain medications, unable to urinate.  Please follow up with your primary care physician in one week regarding your hospitalization, bring copies of your discharge paperwork.  Please follow up with your surgeon, Dr. Catalan within 2 weeks of discharge.      SECONDARY DISCHARGE DIAGNOSES  Diagnosis: Acute ischemic stroke  Assessment and Plan of Treatment: You were diagnosed with a stroke during your admission. Please follow up with Neurology within 2 weeks of discharge.  The number to schedule an appointment is [phone], Tiki Michele NP     PRINCIPAL DISCHARGE DIAGNOSIS  Diagnosis: GI bleed  Assessment and Plan of Treatment: pt had colonoscopy showing pan diverticulosis without active bleeding  Please follow up with Gastroenterology within 2 weeks of discharge.  DIET: HIGH FIBER DIET  NOTIFY YOUR SURGEON IF YOU HAVE: any bleeding that does not stop, any pus draining from your wound(s), any fever (over 100.4 F) persistent nausea/vomiting, or if your pain is not controlled on your discharge pain medications, unable to urinate.  Please follow up with your primary care physician in one week regarding your hospitalization, bring copies of your discharge paperwork.  Please follow up with your surgeon, Dr. Catalan within 2 weeks of discharge.      SECONDARY DISCHARGE DIAGNOSES  Diagnosis: Acute ischemic stroke  Assessment and Plan of Treatment: You were diagnosed with a stroke during your admission. Please follow up with Neurology within 2 weeks of discharge.  The number to schedule an appointment is [phone], Tiki Michele NP  Please continue to take Aspirin 81mg daily.  A new prescription for Atorvastatin 80mg daily was sent to your pharmacy. Your previous dose was 20mg, please note that this is a new prescription in light of your recent stroke. Please continue to take this as prescribed at 80mg daily.     PRINCIPAL DISCHARGE DIAGNOSIS  Diagnosis: GI bleed  Assessment and Plan of Treatment: pt had colonoscopy showing pan diverticulosis without active bleeding  Please follow up with Gastroenterology within 2 weeks of discharge.  DIET: HIGH FIBER DIET  NOTIFY YOUR SURGEON IF YOU HAVE: any bleeding that does not stop, any pus draining from your wound(s), any fever (over 100.4 F) persistent nausea/vomiting, or if your pain is not controlled on your discharge pain medications, unable to urinate.  Please follow up with your primary care physician in one week regarding your hospitalization, bring copies of your discharge paperwork.  Please follow up with your surgeon, Dr. Catalan within 2 weeks of discharge.  ** Please restart home PLAVIX in 2 weeks if no furthe rbleeding* *Please discuss with your PCP prior to restarting      SECONDARY DISCHARGE DIAGNOSES  Diagnosis: Acute ischemic stroke  Assessment and Plan of Treatment: You were diagnosed with a stroke during your admission. Please follow up with Neurology within 2 weeks of discharge.  The number to schedule an appointment is [phone], Tiki Michele NP  Please continue to take Aspirin 81mg daily.  A new prescription for Atorvastatin 80mg daily was sent to your pharmacy. Your previous dose was 20mg, please note that this is a new prescription in light of your recent stroke. Please continue to take this as prescribed at 80mg daily.

## 2022-09-29 NOTE — DISCHARGE NOTE PROVIDER - CARE PROVIDERS DIRECT ADDRESSES
,DirectAddress_Unknown ,DirectAddress_Unknown,[email or web address]

## 2022-09-29 NOTE — PROGRESS NOTE ADULT - ASSESSMENT
77 year old M with past medical history of CAD s/p 1stent ([facility] 12/2021), HLD, GERD, gout, DM, HTN, a-fib on plavix, presented to [facility] for complaints of sudden onset BRBPR; undergoing workup for potential LGIB; found to have moderate pandiverticulosis without evidence of diverticular bleeding on colonoscopy on 9/27. RRT was called twice on 9/26 for uncontrolled, multiple episodes of hematochezia; patient has received a total of 6u of PRBC during this admission for Hg as low as 7.9. Patient recently downgraded from the SICU last night. Code stroke called this morning for new onset slurred speech and weakness on the R side (arm/leg). LKW 1:00 am on 9/28 right before patient went to bed. Patient reports waking up at 4:00 am 9/28 with symptoms at this time. Denies this ever happening before. Exam as above. CTH without acute findings. CTA H/N pending.     NIHSS: 4  mRS: 1    Patient is not a tPA candidate because they are outside of the appropriate window of treatment.  Patient is not a mechanical thrombectomy candidate because no evidence of LVO.    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     Recommendations    Imaging  [] MRI brain w/o contrast   [] TTE     Meds  [x] c/w Aspirin 81 mg   [x] Atorvastatin 80, titrate to LDL<70  [x] DVT prophylaxis    Please call with questions: [id number]    77 year old M with past medical history of CAD s/p 1stent ([facility] 12/2021), HLD, GERD, gout, DM, HTN, a-fib on plavix, presented to [facility] for complaints of sudden onset BRBPR; undergoing workup for potential LGIB; found to have moderate pandiverticulosis without evidence of diverticular bleeding on colonoscopy on 9/27. RRT was called twice on 9/26 for uncontrolled, multiple episodes of hematochezia; patient has received a total of 6u of PRBC during this admission for Hg as low as 7.9. Patient recently downgraded from the SICU last night. Code stroke called this morning for new onset slurred speech and weakness on the R side (arm/leg). LKW 1:00 am on 9/28 right before patient went to bed. Patient reports waking up at 4:00 am 9/28 with symptoms at this time. Denies this ever happening before. Exam as above. CTH without acute findings. CTA H/N pending.     NIHSS: 4  mRS: 1    Patient is not a tPA candidate because they are outside of the appropriate window of treatment.  Patient is not a mechanical thrombectomy candidate because no evidence of LVO.    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     Recommendations    Imaging  [x] MRI brain w/o contrast   [x] TTE     Meds  [x] c/w Aspirin 81 mg   [x] Atorvastatin 80, titrate to LDL<70  [ ] To be determined whether patient would require eliquis given history of Afib   [x] DVT prophylaxis    Please call with questions: [id number]    77 year old M with past medical history of CAD s/p 1stent ([facility] 12/2021), HLD, GERD, gout, DM, HTN, a-fib on plavix, presented to [facility] for complaints of sudden onset BRBPR; undergoing workup for potential LGIB; found to have moderate pandiverticulosis without evidence of diverticular bleeding on colonoscopy on 9/27. RRT was called twice on 9/26 for uncontrolled, multiple episodes of hematochezia; patient has received a total of 6u of PRBC during this admission for Hg as low as 7.9. Patient recently downgraded from the SICU last night. Code stroke called this morning for new onset slurred speech and weakness on the R side (arm/leg). LKW 1:00 am on 9/28 right before patient went to bed. Patient reports waking up at 4:00 am 9/28 with symptoms at this time. Denies this ever happening before. Exam as above. CTH without acute findings. CTA H/N pending.     NIHSS: 4  mRS: 1    Patient is not a tPA candidate because they are outside of the appropriate window of treatment.  Patient is not a mechanical thrombectomy candidate because no evidence of LVO.    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     Recommendations    Imaging  [x] MRI brain w/o contrast   [x] TTE     Meds  [x] c/w Aspirin 81 mg   [x] Atorvastatin 80, titrate to LDL<70  [ ] Given history of atrial fibrillation, patient should be started on Apixaban when cleared by GI and/or surgery. Dose by weight, kidney function, and age. Should be approx 5 mg BID, until age 79; then 2.5 mg BID thereafter.   [x] DVT prophylaxis    Please call with questions: [id number]

## 2022-09-29 NOTE — CONSULT NOTE ADULT - CONSULT REQUESTED DATE/TIME
26-Sep-2022 02:50
29-Sep-2022 10:09
25-Sep-2022 00:07
26-Sep-2022 04:49
28-Sep-2022 07:44
26-Sep-2022 08:23
25-Sep-2022 11:43

## 2022-09-29 NOTE — PROGRESS NOTE ADULT - TIME BILLING
- Review of records, telemetry, vital signs and daily labs.   - General and cardiovascular physical examination.  - Generation of cardiovascular treatment plan.  - Coordination of care.      Patient was seen and examined by me on 9/27/22,interim events noted,labs and radiology studies reviewed.  Khanh Juan MD,FACC.  [address] [place], [place] [id number].  [phone]
- Review of records, telemetry, vital signs and daily labs.   - General and cardiovascular physical examination.  - Generation of cardiovascular treatment plan.  - Coordination of care.      Patient was seen and examined by me on 9/28/22,interim events noted,labs and radiology studies reviewed.  Khanh Juan MD,FACC.  [address] [place], [place] [id number].  [phone]
- Review of records, telemetry, vital signs and daily labs.   - General and cardiovascular physical examination.  - Generation of cardiovascular treatment plan.  - Coordination of care.      Patient was seen and examined by me on 9/29/22,interim events noted,labs and radiology studies reviewed.  Khanh Juan MD,FACC.  [address] [place], [place] [id number].  [phone]

## 2022-09-29 NOTE — DISCHARGE NOTE PROVIDER - CARE PROVIDER_API CALL
Laura Catalan)  Critical Care Medicine; Surgery  [address] [place] [address]  Phone: [phone]  Fax: [phone]  Follow Up Time: 1 week   Laura Catalan)  Critical Care Medicine; Surgery  [address] [place] [address]  Phone: [phone]  Fax: [phone]  Follow Up Time: 1 week    Joie Field)  Gastroenterology; Internal Medicine  [address] [place] [address], [address]  [place] [address]  Phone: [phone]  Fax: [phone]  Follow Up Time: 2 weeks

## 2022-09-29 NOTE — PROGRESS NOTE ADULT - ATTENDING COMMENTS
I have personally interviewed and examined this patient, reviewed pertinent labs and imaging, and discussed the case with colleagues, residents, and physician assistants on B Team rounds.  More than 50% of this 35 minute encounter including face to face with the patient was spent counseling and/or coordination of care on GIB.  Time included review of vitals, labs, imaging, discussion with consultants and coordination with the operating room/emergency department.    78yo M admitted 9/24 with GI bleed, not localized. RRT overnight, but no further blood per rectum since this AM. Denies abdominal pain. No history of smoking or NSAID use. H/o colonoscopy about 4 years ago, pt reports polyp clipped at that time.     - GI for colonoscopy/EGD   - NPO except ice chips, ok to have clears if no further bleeding   - transfuse as needed     The active care issues are:  1. GI bleed      The Acute Care Surgery (B Team) Attending Group Practice:  Dr. Laura Catalan    urgent issues - spectra [id number]  nonurgent issues - [phone]  patient appointments or afterhours - [phone]
Likely diverticular bleed  Plan for colonoscopy tomorrow  Nothing by mouth past midnight, prep to be ordered
I agree with the detailed interval history, physical, and plan, which I have reviewed and edited where appropriate'; also agree with notes/assessment with my team on service.  I have personally examined the patient.  I was physically present for the key portions of the evaluation and management (E/M) service provided.  I reviewed all the pertinent data.  The patient is a critical care patient with life threatening hemodynamic and metabolic instability in SICU.  The SICU team has a constant risk benefit analyzes discussion and coordinating care with the primary team and all consultants.   The patient is in SICU with the chief complaint and diagnosis mentioned in the note.   The plan will be specified in the note.  77y Male with a GI bleed. Patient in SICU; colonoscopy tomorrow.  NEURO  Exam: awake, alert, oriented  RESPIRATORY  RR: 18   SpO2: 100% clear   CARDIOVASCULAR  HR: 67   BP: 144/70   Exam: regular rate   GI/NUTRITION  Exam: soft, nontender, nondistended,   PLAN:   Neurologic:   - Pain f/u  Respiratory:  - monitor   Cardiovascular:    Continue home medications  Gastrointestinal/Nutrition:   -colonoscopy tomorrow  Hematologic:   -F/U CBC  Endocrine:   - ISS  - Will repeat BMP and monitor K+
Status post colonoscopy with pandiverticulosis with no active bleeding  Neurologic events noted this a.m.  Recommendation  Continue with PPI daily  Care as per primary team  Call with questions
resolved diverticular hemorrhage  -hold plavix x 2 weeks  -appreciate neuro and physiatry recommendations  -d/c planning
I have personally interviewed and examined this patient, reviewed pertinent labs and imaging, and discussed the case with colleagues, residents, and physician assistants on B Team rounds.  More than 50% of this 35 minute encounter including face to face with the patient was spent counseling and/or coordination of care on GIB.  Time included review of vitals, labs, imaging, discussion with consultants and coordination with the operating room/emergency department.    78yo M admitted 9/24 with GI bleed, not localized. One bloody bm overnight. H/H stable. Awaiting colonoscopy today. Otherwise without complaints.     - GI for colonoscopy/EGD   - NPO for scope, clears afterward    - transfuse as needed     The active care issues are:  1. GI bleed      The Acute Care Surgery (B Team) Attending Group Practice:  Dr. Laura Catalan    urgent issues - spectra [id number]  nonurgent issues - [phone]  patient appointments or afterhours - [phone] .
I agree with the detailed interval history, physical, and plan, which I have reviewed and edited where appropriate'; also agree with notes/assessment with my team on service.  I have personally examined the patient.  I was physically present for the key portions of the evaluation and management (E/M) service provided.  I reviewed all the pertinent data.  The patient is a critical care patient with life threatening hemodynamic and metabolic instability in SICU.  The SICU team has a constant risk benefit analyzes discussion and coordinating care with the primary team and all consultants.   The patient is in SICU with the chief complaint and diagnosis mentioned in the note.   The plan will be specified in the note.  77y Male with GI bleed in SICU for colonoscopy  NEURO  Exam: awake, alert,   RESPIRATORY  RR: 17   SpO2: 100%   clear  CARDIOVASCULAR  HR: 69   BP: 139/67   Exam: regular rate   GI/NUTRITION  Exam: soft, nontender, nondistended,   PLAN:   Neurologic:   - tylenol prn, gabapentin daily  Respiratory:  - RA   Cardiovascular:   - labetalol, clonidine  Gastrointestinal/Nutrition:   - colonoscopy   /Renal  - LR @ 75ml/hr  Hematologic:   -check CBC  Endocrine:   - ISS    DISPO: SICU.
Pt hemodynamically stable. No active bleeding at this time. Will continue to monitor serial CBC, PRBC as needed. Will cont ASA 81mg daily for his stent (9 months ago). Will f/u Cardiology rec.  Will give IVF hydration for his acute on chronic renal insufficiency and f/u renal consult rec.   F/u with GI for colonoscopy.  D/W HS

## 2022-09-29 NOTE — DISCHARGE NOTE PROVIDER - NSFOLLOWUPCLINICS_GEN_ALL_ED_FT
[facility]  Neurology Autoimmune Encephalitis  [address] [place] [address]  Phone: [phone]  Fax: [phone]  Follow Up Time: 2 weeks

## 2022-09-30 ENCOUNTER — TRANSCRIPTION ENCOUNTER (OUTPATIENT)
Age: 77
End: 2022-09-30

## 2022-09-30 VITALS
HEART RATE: 68 BPM | DIASTOLIC BLOOD PRESSURE: 89 MMHG | RESPIRATION RATE: 18 BRPM | TEMPERATURE: 98 F | SYSTOLIC BLOOD PRESSURE: 137 MMHG | OXYGEN SATURATION: 100 %

## 2022-09-30 LAB
ANION GAP SERPL CALC-SCNC: 10 MMOL/L — SIGNIFICANT CHANGE UP (ref 7–14)
BUN SERPL-MCNC: 37 MG/DL — HIGH (ref 7–23)
CALCIUM SERPL-MCNC: 8.7 MG/DL — SIGNIFICANT CHANGE UP (ref 8.4–10.5)
CHLORIDE SERPL-SCNC: 113 MMOL/L — HIGH (ref 98–107)
CO2 SERPL-SCNC: 19 MMOL/L — LOW (ref 22–31)
CREAT SERPL-MCNC: 3.7 MG/DL — HIGH (ref 0.5–1.3)
EGFR: 16 ML/MIN/1.73M2 — LOW
GLUCOSE BLDC GLUCOMTR-MCNC: 168 MG/DL — HIGH (ref 70–99)
GLUCOSE SERPL-MCNC: 272 MG/DL — HIGH (ref 70–99)
HCT VFR BLD CALC: 26.6 % — LOW (ref 39–50)
HGB BLD-MCNC: 8.7 G/DL — LOW (ref 13–17)
MAGNESIUM SERPL-MCNC: 1.8 MG/DL — SIGNIFICANT CHANGE UP (ref 1.6–2.6)
MCHC RBC-ENTMCNC: 28.4 PG — SIGNIFICANT CHANGE UP (ref 27–34)
MCHC RBC-ENTMCNC: 32.7 GM/DL — SIGNIFICANT CHANGE UP (ref 32–36)
MCV RBC AUTO: 86.9 FL — SIGNIFICANT CHANGE UP (ref 80–100)
NRBC # BLD: 0 /100 WBCS — SIGNIFICANT CHANGE UP (ref 0–0)
NRBC # FLD: 0 K/UL — SIGNIFICANT CHANGE UP (ref 0–0)
PHOSPHATE SERPL-MCNC: 3.1 MG/DL — SIGNIFICANT CHANGE UP (ref 2.5–4.5)
PLATELET # BLD AUTO: 246 K/UL — SIGNIFICANT CHANGE UP (ref 150–400)
POTASSIUM SERPL-MCNC: 4.7 MMOL/L — SIGNIFICANT CHANGE UP (ref 3.5–5.3)
POTASSIUM SERPL-SCNC: 4.7 MMOL/L — SIGNIFICANT CHANGE UP (ref 3.5–5.3)
RBC # BLD: 3.06 M/UL — LOW (ref 4.2–5.8)
RBC # FLD: 14.6 % — HIGH (ref 10.3–14.5)
SODIUM SERPL-SCNC: 142 MMOL/L — SIGNIFICANT CHANGE UP (ref 135–145)
WBC # BLD: 7.37 K/UL — SIGNIFICANT CHANGE UP (ref 3.8–10.5)
WBC # FLD AUTO: 7.37 K/UL — SIGNIFICANT CHANGE UP (ref 3.8–10.5)

## 2022-09-30 RX ORDER — CLOPIDOGREL BISULFATE 75 MG/1
1 TABLET, FILM COATED ORAL
Qty: 0 | Refills: 0 | DISCHARGE

## 2022-09-30 RX ORDER — AMLODIPINE BESYLATE 2.5 MG/1
1 TABLET ORAL
Qty: 0 | Refills: 0 | DISCHARGE
Start: 2022-09-30

## 2022-09-30 RX ORDER — INSULIN DEGLUDEC 100 U/ML
80 INJECTION, SOLUTION SUBCUTANEOUS
Qty: 0 | Refills: 0 | DISCHARGE

## 2022-09-30 RX ORDER — PANTOPRAZOLE SODIUM 20 MG/1
1 TABLET, DELAYED RELEASE ORAL
Qty: 30 | Refills: 0
Start: 2022-09-30 | End: 2022-10-29

## 2022-09-30 RX ORDER — ASPIRIN/CALCIUM CARB/MAGNESIUM 324 MG
1 TABLET ORAL
Qty: 0 | Refills: 0 | DISCHARGE
Start: 2022-09-30

## 2022-09-30 RX ORDER — ATORVASTATIN CALCIUM 80 MG/1
1 TABLET, FILM COATED ORAL
Qty: 30 | Refills: 0
Start: 2022-09-30 | End: 2022-10-29

## 2022-09-30 RX ADMIN — GABAPENTIN 400 MILLIGRAM(S): 400 CAPSULE ORAL at 12:25

## 2022-09-30 RX ADMIN — HEPARIN SODIUM 5000 UNIT(S): 5000 INJECTION INTRAVENOUS; SUBCUTANEOUS at 05:40

## 2022-09-30 RX ADMIN — Medication 200 MILLIGRAM(S): at 12:25

## 2022-09-30 RX ADMIN — Medication 100 MILLIGRAM(S): at 12:25

## 2022-09-30 RX ADMIN — Medication 1 PATCH: at 06:06

## 2022-09-30 RX ADMIN — CARVEDILOL PHOSPHATE 12.5 MILLIGRAM(S): 80 CAPSULE, EXTENDED RELEASE ORAL at 05:40

## 2022-09-30 RX ADMIN — Medication 81 MILLIGRAM(S): at 12:25

## 2022-09-30 RX ADMIN — PANTOPRAZOLE SODIUM 40 MILLIGRAM(S): 20 TABLET, DELAYED RELEASE ORAL at 06:06

## 2022-09-30 RX ADMIN — Medication 200 MILLIGRAM(S): at 05:39

## 2022-09-30 RX ADMIN — Medication 1 PATCH: at 00:32

## 2022-09-30 NOTE — PROGRESS NOTE ADULT - PROVIDER SPECIALTY LIST ADULT
Cardiology
Internal Medicine
Neurology
SICU
Surgery
Gastroenterology
Surgery
Cardiology
Gastroenterology
SICU
Surgery
Cardiology

## 2022-09-30 NOTE — DISCHARGE NOTE NURSING/CASE MANAGEMENT/SOCIAL WORK - NSSCTYPOFSERV_GEN_ALL_CORE
A visiting nurse will visit you 10/1, the day after discharge. The nurse will call you in the morning to set up a visit time. If you do not hear from the nurse, please call the agency. Physical therapy will follow.

## 2022-09-30 NOTE — DISCHARGE NOTE NURSING/CASE MANAGEMENT/SOCIAL WORK - PATIENT PORTAL LINK FT
You can access the FollowMyHealth Patient Portal offered by [facility] by registering at the following website: [email or web address]. By joining [employer]’s FollowMyHealth portal, you will also be able to view your health information using other applications (apps) compatible with our system.

## 2022-09-30 NOTE — PROGRESS NOTE ADULT - ASSESSMENT
ASSESSMENT: 78 yo M pmh CAD s/p 1stent ([facility] 12/2021), HLD, GERD, gout, DM, HTN, ?a-fib (patient denies), on plavix (last dose 9/22, also on aspirin) presents complaining of sudden onset BRBPR this morning. Surgery consulted for evaluation of lower GI bleed s/p colonoscopy revealing pandiverticulosis and one polyp without source of active bleeding. Hospital course complicated by CODE STROKE 9/28 AM. Currently hemodynamically stable    PLAN:     - fu Neuro Stroke recommendations: MRI Brain w/o, TTE, telemetry, neuro checks q4 hrs, ASA 81mg/SQH dvt ppx, Atorvastatin 80mg daily, dysphagia screen, permissive hypertension up to 220/120 x24 hours  -TTE- No concern for clot  - Regular diet (high fiber)  - OOB/Ambulate  - OT/PT- Recommended acute inpatient rehab  - Plavix to be restarted in 2 weeks   - Discharge pending PMNR recs.        General Surgery  B Team  [id number]   ASSESSMENT: 78 yo M pmh CAD s/p 1stent ([facility] 12/2021), HLD, GERD, gout, DM, HTN, ?a-fib (patient denies), on plavix (last dose 9/22, also on aspirin) presents complaining of sudden onset BRBPR this morning. Surgery consulted for evaluation of lower GI bleed s/p colonoscopy revealing pandiverticulosis and one polyp without source of active bleeding. Hospital course complicated by CODE STROKE 9/28 AM. Currently hemodynamically stable    PLAN:     - fu Neuro Stroke recommendations: MRI Brain w/o, TTE, telemetry, neuro checks q4 hrs, ASA 81mg/SQH dvt ppx, Atorvastatin 80mg daily, dysphagia screen, permissive hypertension up to 220/120 x24 hours  -TTE- No concern for clot  - Regular diet (high fiber)  - OOB/Ambulate  - OT/PT- Re-evaluated 9/29 --> new rec is home with home PT.  - Eliquis to be restarted in 2 weeks       General Surgery  B Team  [id number]

## 2022-09-30 NOTE — PROGRESS NOTE ADULT - SUBJECTIVE AND OBJECTIVE BOX
TEAM Surgery Progress Note  Patient is a 77y old  Male who presents with a chief complaint of BRBPR (29 Sep 2022 15:07)      INTERVAL EVENTS: Patient is POD# ***No acute events overnight.  SUBJECTIVE: Patient seen and examined at bedside with surgical team, patient without complaints. Denies fever, chills, CP, SOB nausea, vomiting, abdominal pain.    REVIEW OF SYSTEMS:  Constitutional: No fevers or chills. No malaise or weakness.  EENT: No vision changes. No ear pain. No nasal congestion or rhinitis. No throat pain or dysphagia.  Respiratory: No cough, wheezing, or SOB. No hemoptysis.  Cardiovascular: No chest pain or palpitations.  Gastrointestinal: No abdominal pain. No nausea, vomiting, diarrhea or constipation. No hematochezia. No melena.  Genitourinary: No dysuria, hematuria, or frequency.  Neurologic: No numbness or tingling. No weakness.  Skin: No rashes or pruritus.     OBJECTIVE:    Vital Signs Last 24 Hrs  T(C): 36.6 (29 Sep 2022 20:13), Max: 36.9 (29 Sep 2022 09:13)  T(F): 97.9 (29 Sep 2022 20:13), Max: 98.4 (29 Sep 2022 09:13)  HR: 66 (29 Sep 2022 20:13) (61 - 69)  BP: 180/69 (29 Sep 2022 20:13) (159/57 - 180/69)  BP(mean): --  RR: 18 (29 Sep 2022 20:13) (17 - 18)  SpO2: 100% (29 Sep 2022 20:13) (100% - 100%)    Parameters below as of 29 Sep 2022 20:13  Patient On (Oxygen Delivery Method): room air    I&O's Detail    28 Sep 2022 07:01  -  29 Sep 2022 07:00  --------------------------------------------------------  IN:    Oral Fluid: 300 mL    Sodium Chloride 0.9% Bolus: 1000 mL  Total IN: 1300 mL    OUT:    Voided (mL): 600 mL  Total OUT: 600 mL    Total NET: 700 mL      29 Sep 2022 07:01  -  30 Sep 2022 00:28  --------------------------------------------------------  IN:    Oral Fluid: 720 mL  Total IN: 720 mL    OUT:    Voided (mL): 400 mL  Total OUT: 400 mL    Total NET: 320 mL      MEDICATIONS  (STANDING):  allopurinol 100 milliGRAM(s) Oral daily  amLODIPine   Tablet 10 milliGRAM(s) Oral at bedtime  aspirin  chewable 81 milliGRAM(s) Oral daily  atorvastatin 80 milliGRAM(s) Oral at bedtime  carvedilol 12.5 milliGRAM(s) Oral every 12 hours  cloNIDine Patch 0.3 mG/24Hr(s) 1 patch Transdermal every 7 days  gabapentin 400 milliGRAM(s) Oral daily  heparin   Injectable 5000 Unit(s) SubCutaneous every 8 hours  insulin lispro (ADMELOG) corrective regimen sliding scale   SubCutaneous Before meals and at bedtime  labetalol 200 milliGRAM(s) Oral every 8 hours  pantoprazole    Tablet 40 milliGRAM(s) Oral before breakfast    MEDICATIONS  (PRN):  aluminum hydroxide/magnesium hydroxide/simethicone Suspension 30 milliLiter(s) Oral every 4 hours PRN Dyspepsia  melatonin 3 milliGRAM(s) Oral at bedtime PRN Insomnia  ondansetron Injectable 4 milliGRAM(s) IV Push every 8 hours PRN Nausea and/or Vomiting      PHYSICAL EXAM:  Constitutional: A&Ox3, NAD  Respiratory: Unlabored breathing  Abdomen: Soft, nondistended, NTTP. No rebound or guarding.  Extremities: WWP, ODEN spontaneously    LABS:                        8.2    6.53  )-----------( 207      ( 29 Sep 2022 05:29 )             24.4     09-29    144  |  113<H>  |  46<H>  ----------------------------<  112<H>  4.1   |  18<L>  |  4.16<H>    Ca    8.7      29 Sep 2022 05:29  Phos  3.6     09-29  Mg     2.00     09-29      PT/INR - ( 28 Sep 2022 07:10 )   PT: 10.9 sec;   INR: 0.94 ratio         PTT - ( 28 Sep 2022 07:10 )  PTT:28.2 sec          IMAGING:     TEAM Surgery Progress Note  Patient is a 77y old  Male who presents with a chief complaint of BRBPR (29 Sep 2022 15:07)      INTERVAL EVENTS:   CODE STROKE 9/28 AM for Flat Right sided smile and Upper + Lower Extremity weakness. CT non con showed no large vessel occlusion, No significant stenosis or vascular   abnormality.  Pt hemodynamically stable.   SUBJECTIVE: Patient seen and examined at bedside with surgical team, patient without complaints. Denies fever, chills, CP, SOB nausea, vomiting, abdominal pain.    REVIEW OF SYSTEMS:  Constitutional: No fevers or chills. No malaise or weakness.  EENT: No vision changes. No ear pain. No nasal congestion or rhinitis. No throat pain or dysphagia.  Respiratory: No cough, wheezing, or SOB. No hemoptysis.  Cardiovascular: No chest pain or palpitations.  Gastrointestinal: No abdominal pain. No nausea, vomiting, diarrhea or constipation. No hematochezia. No melena.  Genitourinary: No dysuria, hematuria, or frequency.  Neurologic: No numbness or tingling. No weakness.  Skin: No rashes or pruritus.     OBJECTIVE:    Vital Signs Last 24 Hrs  T(C): 36.6 (29 Sep 2022 20:13), Max: 36.9 (29 Sep 2022 09:13)  T(F): 97.9 (29 Sep 2022 20:13), Max: 98.4 (29 Sep 2022 09:13)  HR: 66 (29 Sep 2022 20:13) (61 - 69)  BP: 180/69 (29 Sep 2022 20:13) (159/57 - 180/69)  BP(mean): --  RR: 18 (29 Sep 2022 20:13) (17 - 18)  SpO2: 100% (29 Sep 2022 20:13) (100% - 100%)    Parameters below as of 29 Sep 2022 20:13  Patient On (Oxygen Delivery Method): room air    I&O's Detail    28 Sep 2022 07:01  -  29 Sep 2022 07:00  --------------------------------------------------------  IN:    Oral Fluid: 300 mL    Sodium Chloride 0.9% Bolus: 1000 mL  Total IN: 1300 mL    OUT:    Voided (mL): 600 mL  Total OUT: 600 mL    Total NET: 700 mL      29 Sep 2022 07:01  -  30 Sep 2022 00:28  --------------------------------------------------------  IN:    Oral Fluid: 720 mL  Total IN: 720 mL    OUT:    Voided (mL): 400 mL  Total OUT: 400 mL    Total NET: 320 mL      MEDICATIONS  (STANDING):  allopurinol 100 milliGRAM(s) Oral daily  amLODIPine   Tablet 10 milliGRAM(s) Oral at bedtime  aspirin  chewable 81 milliGRAM(s) Oral daily  atorvastatin 80 milliGRAM(s) Oral at bedtime  carvedilol 12.5 milliGRAM(s) Oral every 12 hours  cloNIDine Patch 0.3 mG/24Hr(s) 1 patch Transdermal every 7 days  gabapentin 400 milliGRAM(s) Oral daily  heparin   Injectable 5000 Unit(s) SubCutaneous every 8 hours  insulin lispro (ADMELOG) corrective regimen sliding scale   SubCutaneous Before meals and at bedtime  labetalol 200 milliGRAM(s) Oral every 8 hours  pantoprazole    Tablet 40 milliGRAM(s) Oral before breakfast    MEDICATIONS  (PRN):  aluminum hydroxide/magnesium hydroxide/simethicone Suspension 30 milliLiter(s) Oral every 4 hours PRN Dyspepsia  melatonin 3 milliGRAM(s) Oral at bedtime PRN Insomnia  ondansetron Injectable 4 milliGRAM(s) IV Push every 8 hours PRN Nausea and/or Vomiting        General: NAD, resting in bed comfortably.  Cardiac: regular rate, warm and well perfused  Respiratory: Nonlabored respirations, normal cw expansion.  Abdomen: soft, nontender, nondistended.    Extremities: normal strength, FROM, no deformities. No pronator     LABS:                        8.2    6.53  )-----------( 207      ( 29 Sep 2022 05:29 )             24.4     09-29    144  |  113<H>  |  46<H>  ----------------------------<  112<H>  4.1   |  18<L>  |  4.16<H>    Ca    8.7      29 Sep 2022 05:29  Phos  3.6     09-29  Mg     2.00     09-29      PT/INR - ( 28 Sep 2022 07:10 )   PT: 10.9 sec;   INR: 0.94 ratio         PTT - ( 28 Sep 2022 07:10 )  PTT:28.2 sec          IMAGING:

## 2022-09-30 NOTE — CHART NOTE - NSCHARTNOTEFT_GEN_A_CORE
Patient case discussed with attending.   Per GI, due to bleeding risk, the patient will be restarted on eliquis two weeks after discharge   Patient should follow up in 2 weeks with Stroke NP, Tiki Michele in clinic at [address] [place] [address]. Please email [email or web address] w/ basic PHI. ([phone])

## 2022-09-30 NOTE — DISCHARGE NOTE NURSING/CASE MANAGEMENT/SOCIAL WORK - NSDCPEFALRISK_GEN_ALL_CORE
For information on Fall & Injury Prevention, visit: [email or web address] OR  [email or web address] OR  https://www.cdc.gov/steadi/patient.html

## 2022-09-30 NOTE — DISCHARGE NOTE NURSING/CASE MANAGEMENT/SOCIAL WORK - NSDCFUADDAPPT_GEN_ALL_CORE_FT
** Please call Neuro - STROKE @ [phone] to follow up with STROKE NP Tiki Michele in the outpatient setting. Please call to schedule an appointment.    Please follow up with your Primary Care Provider regarding your recent diagnosis of acute diverticular bleed and an acute stroke. Neurology recommended "Given history of atrial fibrillation, patient should be started on Apixaban when cleared by GI and/or surgery. Dose by weight, kidney function, and age. Should be approx 5 mg BID, until age 80; then 2.5mg BID thereafter". Surgery is recommended to start Eliquis in 2 weeks (9/14/22).

## 2022-09-30 NOTE — DISCHARGE NOTE NURSING/CASE MANAGEMENT/SOCIAL WORK - NSDCPNINST_GEN_ALL_CORE
If there is presence of unresolved pain or signs of infection at the surgical site, please call you regular doctor or go to your local emergency room. Signs of infection include pain, heat, swelling, discharged, fever of 100.2 or above. Please report to your local emergency room if you are experiencing shortness of breath or chest pain.

## 2023-01-26 NOTE — PROVIDER CONTACT NOTE (OTHER) - BACKGROUND
Patient admitted for GI hemorrhage. Patient has an active lower GI hemorrhage
Patient admitted for GI hemorrhage. Patient has an active lower GI hemorrhage. Patient has had units of PRBCs via pressure bag
Patient admitted for GI hemorrhage. Patient has an active lower GI hemorrhage
(E4) spontaneous

## 2023-05-20 NOTE — ED ADULT NURSE NOTE - NS ED NOTE  TALK SOMEONE YN
Initial Clinical Review    Admission: Date/Time/Statement:   Admission Orders (From admission, onward)     Ordered        05/19/23 0342  INPATIENT ADMISSION  Once                      Orders Placed This Encounter   Procedures   • INPATIENT ADMISSION     Standing Status:   Standing     Number of Occurrences:   1     Order Specific Question:   Level of Care     Answer:   Med Surg [16]     Order Specific Question:   Estimated length of stay     Answer:   More than 2 Midnights     Order Specific Question:   Certification     Answer:   I certify that inpatient services are medically necessary for this patient for a duration of greater than two midnights  See H&P and MD Progress Notes for additional information about the patient's course of treatment  ED Arrival Information     Expected   -    Arrival   5/19/2023 01:05    Acuity   Emergent            Means of arrival   Wheelchair    Escorted by   Family Member    Service   Hospitalist    Admission type   Emergency            Arrival complaint   Cough,Fever           Chief Complaint   Patient presents with   • Cough     Hx of autism, non-productive wet cough, mom concerned for possible start of pna  Mom giving mucinex at home  Initial Presentation: 31 y o  female presents to ED from home with cold and severe chest congestion for past 2 days  Has had increased work of breathiong, tachypnea and fever  No apparent  Signs of pain  Appetite decreased  PMH is  Rett  Syndrome, intellectual disability, 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  CXR shows  PAULY and   LML  Opacity  COVID negative  Required O2 in ED,  5 L NC  Met Sepsis criteria with  Fever, tachycardia and tachypnea  Admit  Ip with  Pneumonia, Sepsis and plan is  YANIRA, nebulizers, monitor labs and vital signs,  IVF, blood/sputum cultures  Date:    5/20        Day 2:   Continue YANIRA/IVF  Mother feels  Respiratory status improved  Continue nebulizers  F/U  Cultures  Sats now adequate RA        ED Triage Vitals Temperature Pulse Respirations Blood Pressure SpO2   05/19/23 0108 05/19/23 0108 05/19/23 0108 05/19/23 0108 05/19/23 0108   98 7 °F (37 1 °C) (!) 126 18 112/91 91 %      Temp Source Heart Rate Source Patient Position - Orthostatic VS BP Location FiO2 (%)   05/19/23 0108 05/19/23 0108 05/19/23 0108 05/19/23 0108 --   Tympanic Monitor Sitting Left arm       Pain Score       05/19/23 0401       Med Not Given for Pain - for MAR use only          Wt Readings from Last 1 Encounters:   05/19/23 38 6 kg (85 lb)     Additional Vital Signs:   98 6 °F (37 °C) 98 -- 117/76 90 98 % -- -- -- --    05/20/23 0714 -- -- -- -- -- 99 % -- -- None (Room air) --   05/19/23 2313 97 3 °F (36 3 °C) Abnormal  96 18 133/71 92 -- -- -- -- Sitting   05/19/23 1938 -- -- -- -- -- 99 % 32 3 L/min   Nasal cannula --   Nasal Cannula O2 Flow Rate (L/min): patient found on 3 lpm at 05/19/23 1938 05/19/23 1936 -- -- -- -- -- 99 % -- -- -- --   05/19/23 1817 -- 104 -- -- -- -- -- -- -- --   05/19/23 16:33:51 98 3 °F (36 8 °C) 102 -- 109/82 91 96 % -- -- -- --   05/19/23 1430 -- 119 Abnormal  36 Abnormal  136/66 -- 97 % -- -- Nasal cannula Lying   05/19/23 1319 -- -- -- -- -- 97 % -- -- None (Room air) --   05/19/23 1138 98 9 °F (37 2 °C) -- -- -- -- -- -- -- -- --   05/19/23 1107 -- -- -- -- -- 93 % -- -- -- --   05/19/23 1100 -- 129 Abnormal  50 Abnormal  101/56 71 93 % -- -- -- --   05/19/23 1026 -- 116 Abnormal  61 Abnormal  -- -- 97 % -- -- None (Room air) --   05/19/23 0930 -- 122 Abnormal  51 Abnormal  88/50 Abnormal  64 Abnormal  93 % -- -- -- Sitting   05/19/23 0654 -- 108 Abnormal  -- -- -- -- -- -- -- --   05/19/23 0630 -- 127 Abnormal  50 Abnormal  102/59 78 100 % 36 4 L/min Nasal cannula Lying   05/19/23 0517 -- -- -- -- -- -- -- -- Nasal cannula --   05/19/23 0500 -- 137 Abnormal  64 Abnormal   120/72 89 95 % 36 4 L/min Nasal cannula Lying   Resp: pt quiet and resting with her eyes closed for assessment at 05/19/23 0500   05/19/23 0451 -- -- -- -- -- 97 % -- -- Nasal cannula --   05/19/23 0445 -- 140 Abnormal  66 Abnormal  120/79 90 97 % 36 4 L/min Nasal cannula Sitting   05/19/23 0443 -- 142 Abnormal  64 Abnormal  120/79 89 96 % 36 4 L/min Nasal cannula Sitting   05/19/23 0431 -- -- -- -- -- -- 36 4 L/min Nasal cannula --   05/19/23 0313 101 7 °F (38 7 °C) Abnormal   143 Abnormal  56 Abnormal  107/64 -- 96 % 36 4 L/min Nasal cannula Lying   Temp: provider aware at 05/19/23 0313 05/19/23 0226 -- 120 Abnormal  54 Abnormal  115/69 86 96 % 28 2 L/min Nasal cannula Sitting   05/19/23 0200 -- 130 Abnormal  56 Abnormal  115/69 86 96 % -- -- -- --   05/19/23 0130 -- 123 Abnormal  59 Abnormal  122/60 87 94 % 28 2 L/min Nasal cannula Sitting   05/19/23 0108 98 7 °F (37 1 °C) 126 Abnormal  18 112/91 -- 91 % -- -- -- Sitting     Weights (la      Pertinent Labs/Diagnostic Test Results:   CT chest wo contrast   Final Result by Josafat Mancia MD (05/19 1920)      Streak artifact from patient's scoliosis hardware limits evaluation  Left upper and lower lobe infiltrates with a developing infiltrate in the right lower lobe         Recommend follow-up to resolution  Workstation performed: [id number]         XR chest 2 views   Final Result by Kelly William MD (05/19 1040)      Advanced consolidation throughout the left mid and lower lung fields  Prominent gas throughout multiple segments of bowel              Workstation performed: [id number]           Results from last 7 days   Lab Units 05/19/23  0319   SARS-COV-2  Negative     Results from last 7 days   Lab Units 05/20/23  0617 05/19/23  0218   WBC Thousand/uL 13 37* 9 65   HEMOGLOBIN g/dL 11 5 14 5   HEMATOCRIT % 34 1* 44 3   PLATELETS Thousands/uL 255 296   BANDS PCT %  --  6         Results from last 7 days   Lab Units 05/19/23  0218   SODIUM mmol/L 137   POTASSIUM mmol/L 3 6   CHLORIDE mmol/L 104   CO2 mmol/L 25   ANION GAP mmol/L 8   BUN mg/dL 20   CREATININE mg/dL 0 68   EGFR ml/min/1 73sq m 121   CALCIUM mg/dL 9 6     Results from last 7 days   Lab Units 05/19/23  0218   AST U/L 19   ALT U/L 16   ALK PHOS U/L 74   TOTAL PROTEIN g/dL 7 2   ALBUMIN g/dL 3 7   TOTAL BILIRUBIN mg/dL 0 85         Results from last 7 days   Lab Units 05/19/23  0218   GLUCOSE RANDOM mg/dL 132                   Results from last 7 days   Lab Units 05/19/23  0218   PROCALCITONIN ng/ml 17 92*     Results from last 7 days   Lab Units 05/20/23  0105 05/19/23  2252 05/19/23  2016 05/19/23  1446 05/19/23  1025 05/19/23  0706 05/19/23  0337   LACTIC ACID mmol/L 1 8 2 3* 3 9* 4 8* 4 3* 2 9* 4 2*           Results from last 7 days   Lab Units 05/19/23  0258 05/19/23  0245   BLOOD CULTURE  No Growth at 24 hrs  No Growth at 24 hrs                 ED Treatment:   Medication Administration from 05/19/2023 0104 to 05/19/2023 1544       Date/Time Order Dose Route Action Comments     05/19/2023 0137 EDT ipratropium-albuterol (DUO-NEB) 0 5-2 5 mg/3 mL inhalation solution 3 mL 3 mL Nebulization Given --     05/19/2023 0223 EDT ipratropium-albuterol (DUO-NEB) 0 5-2 5 mg/3 mL inhalation solution 3 mL 3 mL Nebulization Given --     05/19/2023 0224 EDT ipratropium-albuterol (DUO-NEB) 0 5-2 5 mg/3 mL inhalation solution 3 mL 3 mL Nebulization Given --     05/19/2023 0444 EDT ceftriaxone (ROCEPHIN) 1 g/50 mL in dextrose IVPB 0 mg Intravenous Stopped --     05/19/2023 0340 EDT ceftriaxone (ROCEPHIN) 1 g/50 mL in dextrose IVPB 1,000 mg Intravenous New Bag --     05/19/2023 0444 EDT sodium chloride 0 9 % bolus 1,000 mL 0 mL Intravenous Stopped --     05/19/2023 0312 EDT sodium chloride 0 9 % bolus 1,000 mL 1,000 mL Intravenous New Bag --     05/19/2023 0358 EDT methylPREDNISolone sodium succinate (Solu-MEDROL) injection 80 mg 80 mg Intravenous Given --     05/19/2023 0401 EDT acetaminophen (TYLENOL) tablet 650 mg 650 mg Oral Given --     05/19/2023 1023 EDT levalbuterol (XOPENEX) inhalation solution 1 25 mg 1 25 mg Nebulization Given -- 05/19/2023 0430 EDT levalbuterol (XOPENEX) inhalation solution 1 25 mg 1 25 mg Nebulization Given --     05/19/2023 1023 EDT ipratropium (ATROVENT) 0 02 % inhalation solution 0 5 mg 0 5 mg Nebulization Given --     05/19/2023 0426 EDT ipratropium (ATROVENT) 0 02 % inhalation solution 0 5 mg 0 5 mg Nebulization Given --     05/19/2023 0518 EDT sodium chloride 0 9 % bolus 500 mL 0 mL Intravenous Stopped --     05/19/2023 0415 EDT sodium chloride 0 9 % bolus 500 mL 500 mL Intravenous New Bag updated start time     05/19/2023 0447 EDT sodium chloride 0 9 % infusion 100 mL/hr Intravenous New Bag --     05/19/2023 9138 EDT acetaminophen (TYLENOL) tablet 650 mg 650 mg Oral Given --     05/19/2023 0514 EDT guaiFENesin (MUCINEX) 12 hr tablet 600 mg 600 mg Oral Given --     05/19/2023 1319 EDT levalbuterol (XOPENEX) inhalation solution 1 25 mg 1 25 mg Nebulization Given --     05/19/2023 1319 EDT ipratropium (ATROVENT) 0 02 % inhalation solution 0 5 mg 0 5 mg Nebulization Given --     05/19/2023 1150 EDT sodium chloride 0 9 % bolus 1,000 mL 1,000 mL Intravenous New Bag --        Present on Admission:  • Pneumonia  • [facility] ([facility] )  • Rett syndrome      Admitting Diagnosis: Cough [R05 9]  Hypoxia [R09 02]  Fever [R50 9]  Pneumonia involving left lung [J18 9]  Sepsis ([facility] ) [A41 9]  Age/Sex: 31 y o  female  Admission Orders:  Scheduled Medications:  cefTRIAXone, 1,000 mg, Intravenous, Q24H  guaiFENesin, 1,200 mg, Oral, BID  levalbuterol, 1 25 mg, Nebulization, TID   And  ipratropium, 0 5 mg, Nebulization, TID      Continuous IV Infusions:  sodium chloride, 100 mL/hr, Intravenous, Continuous      PRN Meds:  acetaminophen, 650 mg, Oral, Q6H PRN  benzonatate, 100 mg, Oral, TID PRN  ipratropium-albuterol, 3 mL, Nebulization, Q4H PRN          Network Utilization Review Department  ATTENTION: Please call with any questions or concerns to [phone] and carefully listen to the prompts so that you are directed to the right person  All voicemails are confidential   Rendell Folds all requests for admission clinical reviews, approved or denied determinations and any other requests to dedicated fax number below belonging to the campus where the patient is receiving treatment   List of dedicated fax numbers for the Facilities:  [address] DENIALS (Administrative/Medical Necessity) [phone]   [address] (Maternity/NICU/Pediatrics) [phone]   919 Meggan Johnson [phone]   [facility]  [phone]   [address] [place] [address] [phone]   [address] [facility] 134 815 [facility] [phone] No

## 2023-07-14 NOTE — ED CDU PROVIDER SUBSEQUENT DAY NOTE - NEUROLOGICAL SPEECH
Elizabeth Pérez is here to discuss her  morbid obesity.   She was kindly referred by Linda Linn MD      Past Medical History:   Diagnosis Date   • Asthma        Family History   Problem Relation Age of Onset   • Hypothyroid Mother    • Patient is unaware of any medical problems Father    • Patient is unaware of any medical problems Sister    • Patient is unaware of any medical problems Sister    • Diabetes Brother    • ADHD/ADD Brother      Uncle COPD, heart attack,   Mother obstructive sleep apnea.   Paternal side grand mother diabetes, her older brother diabetes.   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   At work, a lot of sitting.     Exercise -none.    Sleep habit/ quality - poor. She sleeps with her 3 yo niece who wakes up often.       Mood - anxious, situational,  currently on a process of taking a custody of her niece .   Review Flowsheet  More data exists       7/14/2023   PHQ 2/9 Score   Adult PHQ 2 Score 0   Adult PHQ 2 Interpretation No further screening needed   Little interest or pleasure in activity? Not at all   Feeling down, depressed or hopeless? Not at all       DEPRESSION ASSESSMENT/PLAN:  Depression screening is negative no further plan needed.      48 hour food diary  Breakfast none, lunch 2 PBJ, supper  2 quesadilla   Breakfast bagel,  lunch one PBJ, supper corn x2.         Medications that might be related to weight gain - none    Family support to improve lifestyle - parents, sister, brother , a niece   Who will be supportive to improve her diet.     Ref Range & Units 3 mo ago    Hemoglobin A1C 4.5 - 5.6 % 5.3      Ref Range & Units 3 mo ago 7 yr ago 8 yr ago    TSH 0.350 - 5.000  mcUnits/mL 8.780 High   6.287 High   6.857      Ref Range & Units 3 mo ago  (3/17/23) 3 mo ago  (3/17/23) 8 yr ago  (12/22/14)    Fasting Status 0 - 999 Hours 12  12  13.5 R    Cholesterol <=199 mg/dL 180      Comment:    Desirable         <200   Borderline High   200 to 239   High              >=240   Triglycerides <=149 mg/dL 116      Comment:    Normal            <150   Borderline High   150 to 199   High              200 to 499   Very High         >=500   HDL >=50 mg/dL 51      Comment:    Low              <40   Borderline Low   40 to 49   Near Optimal     50 to 59   Optimal          >=60   LDL <=129 mg/dL 106      Comment:    Optimal           <100   Near Optimal      100 to 129   Borderline High   130 to 159   High              160 to 189   Very High         >=190   Non-HDL Cholesterol mg/dL 129      Comment:    Therapeutic Target:   CHD and risk equivalents  <130   Multiple risk factors     <160   0 to 1 risk factor        <190   Cholesterol/ HDL Ratio <=4.4 3.5      US ABDOMEN COMPLETE ( 6/25/2015)   FINDINGS:  The liver, gallbladder, spleen, pancreas, and kidneys have a  normal sonographic appearance      REVIEW OF SYSTEM  HEENT: Denies any vision change, double or blurred vision, nasal congestion.   Cardiovascular: Denies chest pain, palpitation, irregular heart rhythm.  Respiratory: Denies any shortness of breath, dyspnea on exertion, wheezing , asthma.  Gastrointestinal: Denies  nausea or vomiting, constipation.   Endocrine: Denies any excessive thirst, urination, skin change, abnormal hair growth, irregular period, acne.  Skin:  Denies any excessive skin tags, color change.  Musculoskeletal: Denies any joint pain, swelling.  Neurologic:  Denies any frequent headache, vision change, focal neurological deficit.      PHYSICAL EXAM  General:  No acute distress, appropriate affect, cooperative, good hygiene.   Neck:  No thyromegaly, no palpable nodule, no lymphadenopathy.   Cardiovascular/Respiratory:  Regular rhythm and rate, no murmur, no wheezing, non labored breathing, clear lung sounds.  Abdomen:  Soft, non tender, no palpable mass.   Neurologic:  Alert, oriented, speech fluent, muscle strength 5/5, CN 2~12 grossly intact    PLAN    Elizabeth Pérez  is a very pleasant patient who is here to discuss her obesity .   No known  obesity related medical conditions at this point but is at very high risk for diabetes, cardio vascular disease with her current BMI 48.especially with her family history..     I discussed simple pathophysiology, the importance of early, proper diagnosis and treatment , the benefit of weight loss for it all with her  and she  understands them.     As reviewed above, her diet is very high in calories and carbs . I discussed this with her and she is fully aware that this is purely based on convenience .   She also is more on vegan diet with somewhat limited choice of food.   I want her to work on a better macro balance which often is hard to achieve with vegan diet.   Main source of protein would be legumes and she plans to search for easy high protein vegan recipes to try .   I also advised her to increase actual fruits, veggie intake  Up to 4~5 cups a day.   Will request GLP-1.   I discussed the benefit and common adverse effect of the medication with her .  she will return in a month.     Total time for this encounter was 45 minutes and > 50% was for discussion and counseling and care coordination.           clear

## 2023-10-08 ENCOUNTER — INPATIENT (INPATIENT)
Facility: HOSPITAL | Age: 78
LOS: 3 days | Discharge: HOME | End: 2023-10-12
Attending: STUDENT IN AN ORGANIZED HEALTH CARE EDUCATION/TRAINING PROGRAM | Admitting: STUDENT IN AN ORGANIZED HEALTH CARE EDUCATION/TRAINING PROGRAM
Payer: MEDICARE

## 2023-10-08 VITALS
SYSTOLIC BLOOD PRESSURE: 180 MMHG | RESPIRATION RATE: 18 BRPM | HEART RATE: 85 BPM | OXYGEN SATURATION: 98 % | DIASTOLIC BLOOD PRESSURE: 89 MMHG | TEMPERATURE: 98 F | WEIGHT: 187.39 LBS | HEIGHT: 69 IN

## 2023-10-08 DIAGNOSIS — I48.0 PAROXYSMAL ATRIAL FIBRILLATION: ICD-10-CM

## 2023-10-08 DIAGNOSIS — Z29.9 ENCOUNTER FOR PROPHYLACTIC MEASURES, UNSPECIFIED: ICD-10-CM

## 2023-10-08 DIAGNOSIS — R26.81 UNSTEADINESS ON FEET: ICD-10-CM

## 2023-10-08 DIAGNOSIS — I10 ESSENTIAL (PRIMARY) HYPERTENSION: ICD-10-CM

## 2023-10-08 DIAGNOSIS — E11.9 TYPE 2 DIABETES MELLITUS WITHOUT COMPLICATIONS: ICD-10-CM

## 2023-10-08 DIAGNOSIS — I25.10 ATHEROSCLEROTIC HEART DISEASE OF NATIVE CORONARY ARTERY WITHOUT ANGINA PECTORIS: ICD-10-CM

## 2023-10-08 DIAGNOSIS — Z98.89 OTHER SPECIFIED POSTPROCEDURAL STATES: Chronic | ICD-10-CM

## 2023-10-08 DIAGNOSIS — N17.9 ACUTE KIDNEY FAILURE, UNSPECIFIED: ICD-10-CM

## 2023-10-08 DIAGNOSIS — R29.898 OTHER SYMPTOMS AND SIGNS INVOLVING THE MUSCULOSKELETAL SYSTEM: ICD-10-CM

## 2023-10-08 LAB
ALBUMIN SERPL ELPH-MCNC: 3.3 G/DL — SIGNIFICANT CHANGE UP (ref 3.3–5)
ALP SERPL-CCNC: 57 U/L — SIGNIFICANT CHANGE UP (ref 40–120)
ALT FLD-CCNC: 18 U/L — SIGNIFICANT CHANGE UP (ref 4–41)
ANION GAP SERPL CALC-SCNC: 17 MMOL/L — HIGH (ref 7–14)
APPEARANCE UR: CLEAR — SIGNIFICANT CHANGE UP
APTT BLD: 30.8 SEC — SIGNIFICANT CHANGE UP (ref 24.5–35.6)
AST SERPL-CCNC: 36 U/L — SIGNIFICANT CHANGE UP (ref 4–40)
B PERT DNA SPEC QL NAA+PROBE: SIGNIFICANT CHANGE UP
B PERT+PARAPERT DNA PNL SPEC NAA+PROBE: SIGNIFICANT CHANGE UP
BACTERIA # UR AUTO: NEGATIVE /HPF — SIGNIFICANT CHANGE UP
BASE EXCESS BLDV CALC-SCNC: -7 MMOL/L — LOW (ref -2–3)
BASOPHILS # BLD AUTO: 0 K/UL — SIGNIFICANT CHANGE UP (ref 0–0.2)
BASOPHILS NFR BLD AUTO: 0 % — SIGNIFICANT CHANGE UP (ref 0–2)
BILIRUB SERPL-MCNC: 0.3 MG/DL — SIGNIFICANT CHANGE UP (ref 0.2–1.2)
BILIRUB UR-MCNC: NEGATIVE — SIGNIFICANT CHANGE UP
BLOOD GAS VENOUS COMPREHENSIVE RESULT: SIGNIFICANT CHANGE UP
BORDETELLA PARAPERTUSSIS (RAPRVP): SIGNIFICANT CHANGE UP
BUN SERPL-MCNC: 74 MG/DL — HIGH (ref 7–23)
C PNEUM DNA SPEC QL NAA+PROBE: SIGNIFICANT CHANGE UP
CALCIUM SERPL-MCNC: 9 MG/DL — SIGNIFICANT CHANGE UP (ref 8.4–10.5)
CAST: 3 /LPF — SIGNIFICANT CHANGE UP (ref 0–4)
CHLORIDE BLDV-SCNC: 105 MMOL/L — SIGNIFICANT CHANGE UP (ref 96–108)
CHLORIDE SERPL-SCNC: 106 MMOL/L — SIGNIFICANT CHANGE UP (ref 98–107)
CK MB BLD-MCNC: 0.5 % — SIGNIFICANT CHANGE UP (ref 0–2.5)
CK MB CFR SERPL CALC: 3.2 NG/ML — SIGNIFICANT CHANGE UP
CK SERPL-CCNC: 666 U/L — HIGH (ref 30–200)
CO2 BLDV-SCNC: 19.7 MMOL/L — LOW (ref 22–26)
CO2 SERPL-SCNC: 16 MMOL/L — LOW (ref 22–31)
COLOR SPEC: YELLOW — SIGNIFICANT CHANGE UP
CREAT SERPL-MCNC: 6.12 MG/DL — HIGH (ref 0.5–1.3)
DIFF PNL FLD: ABNORMAL
EGFR: 9 ML/MIN/1.73M2 — LOW
EOSINOPHIL # BLD AUTO: 0.12 K/UL — SIGNIFICANT CHANGE UP (ref 0–0.5)
EOSINOPHIL NFR BLD AUTO: 4.4 % — SIGNIFICANT CHANGE UP (ref 0–6)
FLUAV SUBTYP SPEC NAA+PROBE: SIGNIFICANT CHANGE UP
FLUBV RNA SPEC QL NAA+PROBE: SIGNIFICANT CHANGE UP
GAS PNL BLDV: 135 MMOL/L — LOW (ref 136–145)
GIANT PLATELETS BLD QL SMEAR: PRESENT — SIGNIFICANT CHANGE UP
GLUCOSE BLDC GLUCOMTR-MCNC: 104 MG/DL — HIGH (ref 70–99)
GLUCOSE BLDC GLUCOMTR-MCNC: 122 MG/DL — HIGH (ref 70–99)
GLUCOSE BLDV-MCNC: 167 MG/DL — HIGH (ref 70–99)
GLUCOSE SERPL-MCNC: 164 MG/DL — HIGH (ref 70–99)
GLUCOSE UR QL: 500 MG/DL
HADV DNA SPEC QL NAA+PROBE: SIGNIFICANT CHANGE UP
HCO3 BLDV-SCNC: 19 MMOL/L — LOW (ref 22–29)
HCOV 229E RNA SPEC QL NAA+PROBE: SIGNIFICANT CHANGE UP
HCOV HKU1 RNA SPEC QL NAA+PROBE: SIGNIFICANT CHANGE UP
HCOV NL63 RNA SPEC QL NAA+PROBE: SIGNIFICANT CHANGE UP
HCOV OC43 RNA SPEC QL NAA+PROBE: SIGNIFICANT CHANGE UP
HCT VFR BLD CALC: 31.3 % — LOW (ref 39–50)
HCT VFR BLDA CALC: 32 % — LOW (ref 39–51)
HGB BLD CALC-MCNC: 10.5 G/DL — LOW (ref 12.6–17.4)
HGB BLD-MCNC: 9.9 G/DL — LOW (ref 13–17)
HIV 1+2 AB+HIV1 P24 AG SERPL QL IA: SIGNIFICANT CHANGE UP
HMPV RNA SPEC QL NAA+PROBE: SIGNIFICANT CHANGE UP
HPIV1 RNA SPEC QL NAA+PROBE: SIGNIFICANT CHANGE UP
HPIV2 RNA SPEC QL NAA+PROBE: SIGNIFICANT CHANGE UP
HPIV3 RNA SPEC QL NAA+PROBE: SIGNIFICANT CHANGE UP
HPIV4 RNA SPEC QL NAA+PROBE: SIGNIFICANT CHANGE UP
IANC: 1.42 K/UL — LOW (ref 1.8–7.4)
INR BLD: 1 RATIO — SIGNIFICANT CHANGE UP (ref 0.85–1.18)
KETONES UR-MCNC: NEGATIVE MG/DL — SIGNIFICANT CHANGE UP
LACTATE BLDV-MCNC: 1.9 MMOL/L — SIGNIFICANT CHANGE UP (ref 0.5–2)
LEUKOCYTE ESTERASE UR-ACNC: NEGATIVE — SIGNIFICANT CHANGE UP
LYMPHOCYTES # BLD AUTO: 0.35 K/UL — LOW (ref 1–3.3)
LYMPHOCYTES # BLD AUTO: 13.3 % — SIGNIFICANT CHANGE UP (ref 13–44)
M PNEUMO DNA SPEC QL NAA+PROBE: SIGNIFICANT CHANGE UP
MAGNESIUM SERPL-MCNC: 2.1 MG/DL — SIGNIFICANT CHANGE UP (ref 1.6–2.6)
MANUAL SMEAR VERIFICATION: SIGNIFICANT CHANGE UP
MCHC RBC-ENTMCNC: 26.4 PG — LOW (ref 27–34)
MCHC RBC-ENTMCNC: 31.6 GM/DL — LOW (ref 32–36)
MCV RBC AUTO: 83.5 FL — SIGNIFICANT CHANGE UP (ref 80–100)
MONOCYTES # BLD AUTO: 0.26 K/UL — SIGNIFICANT CHANGE UP (ref 0–0.9)
MONOCYTES NFR BLD AUTO: 9.7 % — SIGNIFICANT CHANGE UP (ref 2–14)
NEUTROPHILS # BLD AUTO: 1.86 K/UL — SIGNIFICANT CHANGE UP (ref 1.8–7.4)
NEUTROPHILS NFR BLD AUTO: 69 % — SIGNIFICANT CHANGE UP (ref 43–77)
NEUTS BAND # BLD: 0.9 % — SIGNIFICANT CHANGE UP (ref 0–6)
NITRITE UR-MCNC: NEGATIVE — SIGNIFICANT CHANGE UP
PCO2 BLDV: 37 MMHG — LOW (ref 42–55)
PH BLDV: 7.31 — LOW (ref 7.32–7.43)
PH UR: 5.5 — SIGNIFICANT CHANGE UP (ref 5–8)
PHOSPHATE SERPL-MCNC: 5.9 MG/DL — HIGH (ref 2.5–4.5)
PLAT MORPH BLD: NORMAL — SIGNIFICANT CHANGE UP
PLATELET # BLD AUTO: 166 K/UL — SIGNIFICANT CHANGE UP (ref 150–400)
PLATELET COUNT - ESTIMATE: NORMAL — SIGNIFICANT CHANGE UP
PO2 BLDV: 33 MMHG — SIGNIFICANT CHANGE UP (ref 25–45)
POTASSIUM BLDV-SCNC: 4.6 MMOL/L — SIGNIFICANT CHANGE UP (ref 3.5–5.1)
POTASSIUM SERPL-MCNC: 4.6 MMOL/L — SIGNIFICANT CHANGE UP (ref 3.5–5.3)
POTASSIUM SERPL-SCNC: 4.6 MMOL/L — SIGNIFICANT CHANGE UP (ref 3.5–5.3)
PROT SERPL-MCNC: 6.7 G/DL — SIGNIFICANT CHANGE UP (ref 6–8.3)
PROT UR-MCNC: >=1000 MG/DL
PROTHROM AB SERPL-ACNC: 11.2 SEC — SIGNIFICANT CHANGE UP (ref 9.5–13)
RAPID RVP RESULT: SIGNIFICANT CHANGE UP
RBC # BLD: 3.75 M/UL — LOW (ref 4.2–5.8)
RBC # FLD: 14.4 % — SIGNIFICANT CHANGE UP (ref 10.3–14.5)
RBC BLD AUTO: NORMAL — SIGNIFICANT CHANGE UP
RBC CASTS # UR COMP ASSIST: 1 /HPF — SIGNIFICANT CHANGE UP (ref 0–4)
RSV RNA SPEC QL NAA+PROBE: SIGNIFICANT CHANGE UP
RV+EV RNA SPEC QL NAA+PROBE: SIGNIFICANT CHANGE UP
SAO2 % BLDV: 49.1 % — LOW (ref 67–88)
SARS-COV-2 RNA SPEC QL NAA+PROBE: SIGNIFICANT CHANGE UP
SODIUM SERPL-SCNC: 139 MMOL/L — SIGNIFICANT CHANGE UP (ref 135–145)
SP GR SPEC: 1.02 — SIGNIFICANT CHANGE UP (ref 1–1.03)
SQUAMOUS # UR AUTO: 2 /HPF — SIGNIFICANT CHANGE UP (ref 0–5)
TROPONIN T, HIGH SENSITIVITY RESULT: 132 NG/L — CRITICAL HIGH
TROPONIN T, HIGH SENSITIVITY RESULT: 142 NG/L — CRITICAL HIGH
UROBILINOGEN FLD QL: 1 MG/DL — SIGNIFICANT CHANGE UP (ref 0.2–1)
VARIANT LYMPHS # BLD: 2.7 % — SIGNIFICANT CHANGE UP (ref 0–6)
WBC # BLD: 2.66 K/UL — LOW (ref 3.8–10.5)
WBC # FLD AUTO: 2.66 K/UL — LOW (ref 3.8–10.5)
WBC UR QL: 0 /HPF — SIGNIFICANT CHANGE UP (ref 0–5)

## 2023-10-08 PROCEDURE — 99223 1ST HOSP IP/OBS HIGH 75: CPT

## 2023-10-08 PROCEDURE — 70450 CT HEAD/BRAIN W/O DYE: CPT | Mod: 26,MA

## 2023-10-08 PROCEDURE — 71046 X-RAY EXAM CHEST 2 VIEWS: CPT | Mod: 26

## 2023-10-08 PROCEDURE — 99285 EMERGENCY DEPT VISIT HI MDM: CPT

## 2023-10-08 RX ORDER — SEMAGLUTIDE 0.68 MG/ML
0 INJECTION, SOLUTION SUBCUTANEOUS
Qty: 0 | Refills: 0 | DISCHARGE

## 2023-10-08 RX ORDER — DEXTROSE 50 % IN WATER 50 %
15 SYRINGE (ML) INTRAVENOUS ONCE
Refills: 0 | Status: DISCONTINUED | OUTPATIENT
Start: 2023-10-08 | End: 2023-10-12

## 2023-10-08 RX ORDER — INSULIN LISPRO 100/ML
VIAL (ML) SUBCUTANEOUS
Refills: 0 | Status: DISCONTINUED | OUTPATIENT
Start: 2023-10-08 | End: 2023-10-10

## 2023-10-08 RX ORDER — AMLODIPINE BESYLATE 2.5 MG/1
10 TABLET ORAL AT BEDTIME
Refills: 0 | Status: DISCONTINUED | OUTPATIENT
Start: 2023-10-08 | End: 2023-10-10

## 2023-10-08 RX ORDER — ATORVASTATIN CALCIUM 80 MG/1
80 TABLET, FILM COATED ORAL AT BEDTIME
Refills: 0 | Status: DISCONTINUED | OUTPATIENT
Start: 2023-10-08 | End: 2023-10-12

## 2023-10-08 RX ORDER — SODIUM CHLORIDE 9 MG/ML
1000 INJECTION, SOLUTION INTRAVENOUS
Refills: 0 | Status: DISCONTINUED | OUTPATIENT
Start: 2023-10-08 | End: 2023-10-12

## 2023-10-08 RX ORDER — AMLODIPINE BESYLATE AND BENAZEPRIL HYDROCHLORIDE 10; 20 MG/1; MG/1
1 CAPSULE ORAL
Qty: 0 | Refills: 0 | DISCHARGE

## 2023-10-08 RX ORDER — LABETALOL HCL 100 MG
200 TABLET ORAL THREE TIMES A DAY
Refills: 0 | Status: DISCONTINUED | OUTPATIENT
Start: 2023-10-08 | End: 2023-10-12

## 2023-10-08 RX ORDER — LABETALOL HCL 100 MG
1 TABLET ORAL
Qty: 0 | Refills: 0 | DISCHARGE

## 2023-10-08 RX ORDER — DEXTROSE 50 % IN WATER 50 %
25 SYRINGE (ML) INTRAVENOUS ONCE
Refills: 0 | Status: DISCONTINUED | OUTPATIENT
Start: 2023-10-08 | End: 2023-10-12

## 2023-10-08 RX ORDER — GEMFIBROZIL 600 MG
1 TABLET ORAL
Refills: 0 | DISCHARGE

## 2023-10-08 RX ORDER — DEXTROSE 50 % IN WATER 50 %
12.5 SYRINGE (ML) INTRAVENOUS ONCE
Refills: 0 | Status: DISCONTINUED | OUTPATIENT
Start: 2023-10-08 | End: 2023-10-12

## 2023-10-08 RX ORDER — HEPARIN SODIUM 5000 [USP'U]/ML
5000 INJECTION INTRAVENOUS; SUBCUTANEOUS EVERY 8 HOURS
Refills: 0 | Status: DISCONTINUED | OUTPATIENT
Start: 2023-10-08 | End: 2023-10-12

## 2023-10-08 RX ORDER — DAPAGLIFLOZIN 10 MG/1
1 TABLET, FILM COATED ORAL
Refills: 0 | DISCHARGE

## 2023-10-08 RX ORDER — INSULIN LISPRO 100/ML
VIAL (ML) SUBCUTANEOUS AT BEDTIME
Refills: 0 | Status: DISCONTINUED | OUTPATIENT
Start: 2023-10-08 | End: 2023-10-10

## 2023-10-08 RX ORDER — SODIUM CHLORIDE 9 MG/ML
1000 INJECTION, SOLUTION INTRAVENOUS
Refills: 0 | Status: COMPLETED | OUTPATIENT
Start: 2023-10-08 | End: 2023-10-09

## 2023-10-08 RX ORDER — ALLOPURINOL 300 MG
1 TABLET ORAL
Qty: 0 | Refills: 0 | DISCHARGE

## 2023-10-08 RX ORDER — GEMFIBROZIL 600 MG
600 TABLET ORAL
Refills: 0 | Status: DISCONTINUED | OUTPATIENT
Start: 2023-10-08 | End: 2023-10-08

## 2023-10-08 RX ORDER — INSULIN DEGLUDEC 100 U/ML
80 INJECTION, SOLUTION SUBCUTANEOUS
Refills: 0 | DISCHARGE

## 2023-10-08 RX ORDER — GLUCAGON INJECTION, SOLUTION 0.5 MG/.1ML
1 INJECTION, SOLUTION SUBCUTANEOUS ONCE
Refills: 0 | Status: DISCONTINUED | OUTPATIENT
Start: 2023-10-08 | End: 2023-10-12

## 2023-10-08 RX ORDER — ACETAMINOPHEN 500 MG
650 TABLET ORAL EVERY 6 HOURS
Refills: 0 | Status: DISCONTINUED | OUTPATIENT
Start: 2023-10-08 | End: 2023-10-12

## 2023-10-08 RX ORDER — ALLOPURINOL 300 MG
100 TABLET ORAL DAILY
Refills: 0 | Status: DISCONTINUED | OUTPATIENT
Start: 2023-10-08 | End: 2023-10-12

## 2023-10-08 RX ORDER — ASPIRIN/CALCIUM CARB/MAGNESIUM 324 MG
81 TABLET ORAL DAILY
Refills: 0 | Status: DISCONTINUED | OUTPATIENT
Start: 2023-10-08 | End: 2023-10-12

## 2023-10-08 NOTE — H&P ADULT - NSHPREVIEWOFSYSTEMS_GEN_ALL_CORE
REVIEW OF SYSTEMS:    CONSTITUTIONAL: No fevers or chills  EYES/ENT: No visual changes;  No vertigo or throat pain   NECK: No pain or stiffness  RESPIRATORY: No cough, wheezing, hemoptysis; No shortness of breath  CARDIOVASCULAR: No chest pain or palpitations  GASTROINTESTINAL: No abdominal or epigastric pain. No nausea, vomiting, or hematemesis; No diarrhea or constipation. No melena or hematochezia.  GENITOURINARY: No dysuria, frequency or hematuria  NEUROLOGICAL: No numbness. +LE weakness  SKIN: No itching, rashes

## 2023-10-08 NOTE — ED ADULT TRIAGE NOTE - CHIEF COMPLAINT QUOTE
pt living with DM type2, c/o of generalized weakness and difficulty walking for past few days, pt denies any chest pain, no neuro deficits noted, pt c/o of pain to lower extremities

## 2023-10-08 NOTE — H&P ADULT - HISTORY OF PRESENT ILLNESS
Pt is a 78-year-old male with past medical history of CAD status post 1 stent, hyperlipidemia, diabetes, hypertension, A-fib no longer on anticoagulation reports only aspirin, CVA with no residual deficits, CKD,  presents to the ER complaining of progressively worsening bilateral lower extremity weakness.    In ED, vitals T, HR, BP, RR, O2 sat  Labs significant for  EKG personally reviewed  CXR:  Imaging:  ED management: Pt is a 78-year-old male with past medical history of CAD status post 1 stent, hyperlipidemia, diabetes, hypertension, A-fib no longer on anticoagulation reports only aspirin, CVA with no residual deficits, CKD,  presents to the ER complaining of progressively worsening bilateral lower extremity weakness x6 days. Pt states he feels unsteady when he is walking due to his legs feeling weak. He states symptoms have actually improved today but states he was still concerned. He denies any back pain, saddle anesthesia, LE numbness, urinary retention, urinary incontinence, bowel incontinence, leg pain, HA, dizziness/light headedness, visual changes, or falls. He states he does have some neck pain with some numbness that radiates down to both arms for which he states he has been going to PT for. He reports recent trip to [place] from 9/22-9/28, thinks he had a viral URI - was sneezing and had rhinorrhea. He denies any chest pain, SOB, abd pain, n/v/d, or urinary symptoms. He states he has had poor appetite.     In ED, vitals T98, HR 85, /89, RR 18, O2 sat 98% on RA  Labs significant for: wbc 2.99, Hb 9.9 - at baseline, BUN/Cr 74/6.12, trop 142-->132  EKG personally reviewed: sinus rhythm w/ 1st degree AV block, T wave inversions in V5-V6 unchanged from prior EKG 9/2022  CXR: IMPRESSION:  Clear lungs.    Imaging: CTH: IMPRESSION: No intracranial hemorrhage or acute transcortical infarct.   Stable exam.  ED management: no acute interventions

## 2023-10-08 NOTE — H&P ADULT - NSICDXPASTMEDICALHX_GEN_ALL_CORE_FT
PAST MEDICAL HISTORY:  Afib     BPH (benign prostatic hyperplasia)     Chronic kidney disease (CKD)     CVA (cerebrovascular accident)     DM (diabetes mellitus)     Gout     HTN (hypertension)

## 2023-10-08 NOTE — H&P ADULT - PROBLEM SELECTOR PLAN 4
Pt reports he is on ozempic, farxiga, tresiba  - start low dose ISS wbc 2.66, low wbc 2.66. Afebrile, no infectious symptoms.  - continue to monitor.

## 2023-10-08 NOTE — ED PROVIDER NOTE - PHYSICAL EXAMINATION
Vital signs reviewed.   CONSTITUTIONAL: Well-appearing; well-nourished; in no apparent distress. Non-toxic appearing.   HEAD: Normocephalic, atraumatic.  EYES: PERRL, EOM intact, conjunctiva and sclera WNL.  CARD: Normal S1, S2; no murmurs, rubs, or gallops noted.  RESP: Normal chest excursion with respiration; breath sounds clear and equal bilaterally; no wheezes, rhonchi, or rales.  ABD/GI: soft, non-distended; non-tender; no palpable organomegaly, no pulsatile mass.  EXT/MS: moves all extremities; distal pulses are normal, no pedal edema. 5/5 strength, sensation intact   SKIN: Normal for age and race; warm; dry; good turgor; no apparent lesions or exudate noted.  NEURO: Awake, alert, oriented x 3, no gross deficits, CN II-XII grossly intact, no motor or sensory deficit noted. Ambulation no tested   PSYCH: Normal mood; appropriate affect.

## 2023-10-08 NOTE — ED PROVIDER NOTE - CLINICAL SUMMARY MEDICAL DECISION MAKING FREE TEXT BOX
78-year-old male with past medical history of CAD status post 1 stent, hyperlipidemia, diabetes, hypertension, A-fib no longer on anticoagulation reports only aspirin, CVA with no residual deficits, CKD,  presents to the ER complaining of progressively worsening bilateral lower extremity weakness causing unsteady gait and feeling of falling forward for the past 6 days.  r.o anemia, metabolic abn, uti, pna, ICH   labs, cxr, ct head,

## 2023-10-08 NOTE — ED ADULT TRIAGE NOTE - BEFAST ARM SIDE DRIFT
[FreeTextEntry1] : Patient with poorly controlled type 2 diabetes. His last  A1c has increased to 9.0.. He  says he is compliant with his medication and that his diet has improved in general but has not been good the past few months. . He does see his ophthalmologist regularly. He denies any symptoms of chest pain shortness of breath or palpitations. He does not note any discomfort in his extremities. He was  started on a GLP 1 weekly; has h/o elevated amylase and lipase. He admits to be using some alcohol, but denies any abdominal or back pain. He has been losing some weight. Forgot his meter today.  No

## 2023-10-08 NOTE — H&P ADULT - PROBLEM SELECTOR PLAN 6
Pt not on AC, EKG sinus. Pt states it was stopped by cardiologist, possibly due to hx of GI bleed.   - continue to monitor Pt reports he is on ozempic, farxiga, tresiba  - start low dose ISS

## 2023-10-08 NOTE — H&P ADULT - PROBLEM SELECTOR PLAN 3
S/p stent in 2021 at [facility]  - c/w asa 81 and atorvastation  - hold gemfibrozil in setting of RADHA Trop elevated, suspect likely in setting of RADHA on CKD. Pt denies CP or SOB. EKG unchanged from prior. Low suspicion for ACS.   - continue to monitor

## 2023-10-08 NOTE — H&P ADULT - NSHPPHYSICALEXAM_GEN_ALL_CORE
VITAL SIGNS:  T(C): 37.4 (10-08-23 @ 19:26), Max: 37.4 (10-08-23 @ 19:26)  T(F): 99.3 (10-08-23 @ 19:26), Max: 99.3 (10-08-23 @ 19:26)  HR: 77 (10-08-23 @ 19:26) (74 - 85)  BP: 179/78 (10-08-23 @ 19:26) (153/87 - 180/89)  BP(mean): --  RR: 16 (10-08-23 @ 19:26) (16 - 21)  SpO2: 100% (10-08-23 @ 19:26) (98% - 100%)  Wt(kg): --    PHYSICAL EXAM:    Constitutional: resting comfortably in bed; NAD  Head: NC/AT  Eyes: PERRL, EOMI, anicteric sclera  ENT: no nasal discharge; uvula midline, no oropharyngeal erythema or exudates; MMM  Neck: supple  Respiratory: CTA B/L; no W/R/R, no retractions  Cardiac: +S1/S2; RRR; no M/R/G  Gastrointestinal: abdomen soft, NT/ND; no rebound or guarding; +BSx4  Back: spine midline, no bony tenderness  Extremities: WWP, no clubbing or cyanosis; no peripheral edema  Musculoskeletal: NROM x4; no joint swelling, tenderness or erythema  Vascular: distal pulses intact  Dermatologic: skin warm, dry and intact; no rashes  Lymphatic: no submandibular or cervical LAD  Neurologic: AAOx3; moves all 4 extremities  Psychiatric: affect and characteristics of appearance, verbalizations, behaviors are appropriate VITAL SIGNS:  T(C): 37.4 (10-08-23 @ 19:26), Max: 37.4 (10-08-23 @ 19:26)  T(F): 99.3 (10-08-23 @ 19:26), Max: 99.3 (10-08-23 @ 19:26)  HR: 77 (10-08-23 @ 19:26) (74 - 85)  BP: 179/78 (10-08-23 @ 19:26) (153/87 - 180/89)  BP(mean): --  RR: 16 (10-08-23 @ 19:26) (16 - 21)  SpO2: 100% (10-08-23 @ 19:26) (98% - 100%)  Wt(kg): --    PHYSICAL EXAM:    Constitutional: resting comfortably in bed; NAD  Head: NC/AT  Eyes: PERRL, EOMI, anicteric sclera  ENT: no nasal discharge; uvula midline, no oropharyngeal erythema or exudates; MMM  Neck: supple  Respiratory: CTA B/L; no W/R/R, no retractions  Cardiac: +S1/S2; RRR; no M/R/G  Gastrointestinal: abdomen soft, NT/ND; no rebound or guarding; +BSx4  Back: spine midline, no bony tenderness  Extremities: WWP, no clubbing or cyanosis; no peripheral edema  Musculoskeletal: NROM x4; no joint swelling, tenderness or erythema  Vascular: distal pulses intact  Dermatologic: skin warm, dry and intact; no rashes  Lymphatic: no submandibular or cervical LAD  Neurologic: AAOx3; moves all 4 extremities; CN II-XII grossly intact, strength 5/5 b/l UE and LE, sensation intact to light touch b/l UE and LE, no dysmetria on fnf, no pronator drift  Psychiatric: affect and characteristics of appearance, verbalizations, behaviors are appropriate

## 2023-10-08 NOTE — H&P ADULT - PROBLEM SELECTOR PLAN 8
Pt not on AC, EKG sinus. Pt states it was stopped by cardiologist, possibly due to hx of GI bleed.   - continue to monitor

## 2023-10-08 NOTE — H&P ADULT - PROBLEM SELECTOR PLAN 7
DVT prophylaxis: hep subq BP elevated. Pt on labetalol, amlodipine, clonidine, and carvedilol. Unclear why pt is on 2 beta-blockers.  - will c/w labetalol 200 mg TID, amlodipine 10 mg QD, and clonidine patch 0.1 mg q week.

## 2023-10-08 NOTE — ED ADULT NURSE NOTE - OBJECTIVE STATEMENT
Patient is a 79 yo male, phx HTN, DM2, presenting with generalized weakness x 6 days. AAOx4, no signs of distress, reports he was just discharged after a GIB and a stroke but was feeling fine until 6 days ago. No neuro deficits at this time. Denies fall or injury, fever, cough, nausea, vomiting, diarrhea, chest pain, SOB. Placed on cardiac monitor, NSR, VSS. 20G PIV placed to LFA. Pending MD evaluation. Report given to primary RN Luna. Fall precautions maintained.

## 2023-10-08 NOTE — ED PROVIDER NOTE - ATTENDING APP SHARED VISIT CONTRIBUTION OF CARE
Patient is a 77-year-old male with a history of CAD s/p 1stent ([facility] 12/2021), HLD, GERD, gout, DM, HTN, ?a-fib (patient denies), on plavix (last dose 9/22, also on aspirin), history of lower GI bleed, CVA on 9/28/23  now here for complaint of feeling off balance, difficulty walking x6 days.  Patient states after his stroke, he does not use any cane or walker to walk and can ambulate on his own.  He denies any falls, head trauma.  No fevers, chills, nausea, vomiting.  No chest pain or shortness of breath.  Denies any abdominal pain.  No urinary symptoms.  Denies any black or bloody stools.  He states he is not taking Eliquis.  He has no headache or vision changes.      VS noted  Gen. no acute distress, Non toxic   HEENT:  PERRLA, EOMI, mmm  Lungs: CTAB/L no C/ W /R   CVS: RRR   Abd; Soft non tender, non distended   Ext: no edema  Skin: no rash  Neuro AAOx3, CN II-XII intact, strength equal in UE/LE bilaterally, gait not assessed, clear speech  a/p:  dizziness/off-balance–patient with recent CVA now here for 6 days of unable to walk secondary to feeling off balance.  He states he has a continuous feeling that he is going to fall when walking.  Plan for CT head, labs, neurology consult.  - Elfego GARCIA

## 2023-10-08 NOTE — H&P ADULT - PROBLEM SELECTOR PLAN 1
Pt reports b/l LE weakness x6 days causing unsteady gait. No red flag symptoms, good strength throughout w/o any focal findings on neuro exam. Pt reports symptoms are improving. CTH with stable findings, possible evidence of NPH as seen on previous CTH from 9/2022. Pt w/o other symptoms of urinary incontinence or change in mental status. Unclear etiology, less likely gbs vs myopathy vs stroke   - f/u CK, Mg, phos, TSH  - can consider neuro eval if not improving Pt reports b/l LE weakness x6 days causing unsteady gait. No red flag symptoms, strength 5/5 throughout w/o any focal findings on neuro exam. Pt reports symptoms are improving. CTH with stable findings, possible evidence of NPH as seen on previous CTH from 9/2022. Pt w/o other symptoms of urinary incontinence or change in mental status. Unclear etiology, gbs (recent flu like illness) vs myopathy vs less stroke or infectious   - f/u CK, Mg, phos, TSH  - can consider neuro eval if not improving Pt reports b/l LE weakness x6 days causing unsteady gait. No red flag symptoms, strength 5/5 throughout w/o any focal findings on neuro exam. Pt reports symptoms are improving. CTH with stable findings, possible evidence of NPH as seen on previous CTH from 9/2022. Pt w/o other symptoms of urinary incontinence or change in mental status. Unclear etiology, nph vs gbs (recent flu like illness) vs myopathy vs less stroke or infectious   - f/u CK, Mg, phos, TSH  - can consider neuro eval if not improving

## 2023-10-08 NOTE — ED PROVIDER NOTE - OBJECTIVE STATEMENT
78-year-old male with past medical history of CAD status post 1 stent, hyperlipidemia, diabetes, hypertension, A-fib no longer on anticoagulation reports only aspirin, CVA with no residual deficits, CKD,  presents to the ER complaining of progressively worsening bilateral lower extremity weakness causing unsteady gait and feeling of falling forward for the past 6 days.  Denies head trauma, headache, visual changes, sensation deficits, fevers, chills, cough, upper extremity weakness, chest pain, shortness of breath, abdominal pain, nausea, vomiting, dysuria, hematuria, urinary incontinence, urinary retention, diarrhea, bloody stools, constipation.

## 2023-10-08 NOTE — H&P ADULT - PROBLEM SELECTOR PLAN 2
Cr elevated to 6.17, increased from 3.7 from 9/2022. Possible pre-renal in setting of poor PO intake. U/A w/ protein  - f/u urine lytes, urine pr/cr ratio  - f/u renal U/S  - will start trial IVF  - avoid nephrotoxic agents  - hold farxiga for now Cr elevated to 6.17, increased from 3.7 from 9/2022. Possible pre-renal in setting of poor PO intake. U/A w/ protein  - f/u urine lytes, urine pr/cr ratio  - f/u renal U/S  - will start trial IVF  - avoid nephrotoxic agents  - hold farxiga for now  - renal consult in  AM

## 2023-10-08 NOTE — H&P ADULT - ASSESSMENT
Pt is a 78-year-old male with past medical history of CAD status post 1 stent, hyperlipidemia, diabetes, hypertension, A-fib no longer on anticoagulation reports only aspirin, CVA with no residual deficits, CKD,  presents to the ER complaining of progressively worsening bilateral lower extremity weakness.   Pt is a 78-year-old male with past medical history of CAD status post 1 stent, hyperlipidemia, diabetes, hypertension, A-fib no longer on anticoagulation reports only aspirin, CVA with no residual deficits, CKD,  presents to the ER complaining of progressively worsening bilateral lower extremity weakness. Pt found to have RADHA. Pt admitted for further evaluation and management

## 2023-10-08 NOTE — H&P ADULT - NSHPLABSRESULTS_GEN_ALL_CORE
.  LABS:                         9.9    2.66  )-----------( 166      ( 08 Oct 2023 14:21 )             31.3     10    139  |  106  |  74<H>  ----------------------------<  164<H>  4.6   |  16<L>  |  6.12<H>    Ca    9.0      08 Oct 2023 14:21    TPro  6.7  /  Alb  3.3  /  TBili  0.3  /  DBili  x   /  AST  36  /  ALT  18  /  AlkPhos  57  10-08    PT/INR - ( 08 Oct 2023 14:21 )   PT: 11.2 sec;   INR: 1.00 ratio         PTT - ( 08 Oct 2023 14:21 )  PTT:30.8 sec  Urinalysis Basic - ( 08 Oct 2023 15:40 )    Color: Yellow / Appearance: Clear / S.020 / pH: x  Gluc: x / Ketone: Negative mg/dL  / Bili: Negative / Urobili: 1.0 mg/dL   Blood: x / Protein: >=1000 mg/dL / Nitrite: Negative   Leuk Esterase: Negative / RBC: 1 /HPF / WBC 0 /HPF   Sq Epi: x / Non Sq Epi: 2 /HPF / Bacteria: Negative /HPF                RADIOLOGY, EKG & ADDITIONAL TESTS: Reviewed.

## 2023-10-08 NOTE — ED ADULT NURSE REASSESSMENT NOTE - NSFALLRISKINTERV_ED_ALL_ED

## 2023-10-08 NOTE — ED ADULT NURSE NOTE - NSFALLUNIVINTERV_ED_ALL_ED
Bed/Stretcher in lowest position, wheels locked, appropriate side rails in place/Call bell, personal items and telephone in reach/Instruct patient to call for assistance before getting out of bed/chair/stretcher/Non-slip footwear applied when patient is off stretcher/[place] to call system/Physically safe environment - no spills, clutter or unnecessary equipment/Purposeful proactive rounding/Room/bathroom lighting operational, light cord in reach

## 2023-10-08 NOTE — H&P ADULT - PROBLEM SELECTOR PLAN 5
BP elevated. Pt on labetalol, amlodipine, clonidine, and carvedilol. Unclear why pt is on 2 beta-blockers.  - will c/w labetalol 200 mg TID, amlodipine 10 mg QD, and clonidine patch 0.1 mg q week. S/p stent in 2021 at [facility]  - c/w asa 81 and atorvastation  - hold gemfibrozil in setting of RADHA

## 2023-10-08 NOTE — H&P ADULT - TIME BILLING
I have spent a total of greater than 80 minutes time spent to prepare to see the patient, obtaining and reviewing history, physical examination, explaining the diagnosis, prognosis and treatment plan with the patient/family/caregiver. I also have spent the time ordering studies and testing, interpreting results, medicine reconciliation, and documentation as above.

## 2023-10-09 DIAGNOSIS — D72.819 DECREASED WHITE BLOOD CELL COUNT, UNSPECIFIED: ICD-10-CM

## 2023-10-09 DIAGNOSIS — R79.89 OTHER SPECIFIED ABNORMAL FINDINGS OF BLOOD CHEMISTRY: ICD-10-CM

## 2023-10-09 LAB
A1C WITH ESTIMATED AVERAGE GLUCOSE RESULT: 6.4 % — HIGH (ref 4–5.6)
ANION GAP SERPL CALC-SCNC: 13 MMOL/L — SIGNIFICANT CHANGE UP (ref 7–14)
ANISOCYTOSIS BLD QL: SLIGHT — SIGNIFICANT CHANGE UP
BASOPHILS # BLD AUTO: 0.03 K/UL — SIGNIFICANT CHANGE UP (ref 0–0.2)
BASOPHILS NFR BLD AUTO: 0.9 % — SIGNIFICANT CHANGE UP (ref 0–2)
BUN SERPL-MCNC: 76 MG/DL — HIGH (ref 7–23)
BURR CELLS BLD QL SMEAR: SLIGHT — SIGNIFICANT CHANGE UP
CALCIUM SERPL-MCNC: 8.8 MG/DL — SIGNIFICANT CHANGE UP (ref 8.4–10.5)
CHLORIDE SERPL-SCNC: 106 MMOL/L — SIGNIFICANT CHANGE UP (ref 98–107)
CK SERPL-CCNC: 576 U/L — HIGH (ref 30–200)
CO2 SERPL-SCNC: 19 MMOL/L — LOW (ref 22–31)
CREAT ?TM UR-MCNC: 139 MG/DL — SIGNIFICANT CHANGE UP
CREAT SERPL-MCNC: 6.13 MG/DL — HIGH (ref 0.5–1.3)
CULTURE RESULTS: NO GROWTH — SIGNIFICANT CHANGE UP
EGFR: 9 ML/MIN/1.73M2 — LOW
EOSINOPHIL # BLD AUTO: 0.14 K/UL — SIGNIFICANT CHANGE UP (ref 0–0.5)
EOSINOPHIL NFR BLD AUTO: 4.4 % — SIGNIFICANT CHANGE UP (ref 0–6)
ESTIMATED AVERAGE GLUCOSE: 137 — SIGNIFICANT CHANGE UP
FOLATE SERPL-MCNC: 13.7 NG/ML — SIGNIFICANT CHANGE UP (ref 3.1–17.5)
GIANT PLATELETS BLD QL SMEAR: PRESENT — SIGNIFICANT CHANGE UP
GLUCOSE BLDC GLUCOMTR-MCNC: 103 MG/DL — HIGH (ref 70–99)
GLUCOSE BLDC GLUCOMTR-MCNC: 112 MG/DL — HIGH (ref 70–99)
GLUCOSE BLDC GLUCOMTR-MCNC: 116 MG/DL — HIGH (ref 70–99)
GLUCOSE BLDC GLUCOMTR-MCNC: 117 MG/DL — HIGH (ref 70–99)
GLUCOSE SERPL-MCNC: 102 MG/DL — HIGH (ref 70–99)
HCT VFR BLD CALC: 32.7 % — LOW (ref 39–50)
HGB BLD-MCNC: 10.4 G/DL — LOW (ref 13–17)
IANC: 1.68 K/UL — LOW (ref 1.8–7.4)
IMM GRANULOCYTES NFR BLD AUTO: 0.6 % — SIGNIFICANT CHANGE UP (ref 0–0.9)
LYMPHOCYTES # BLD AUTO: 0.65 K/UL — LOW (ref 1–3.3)
LYMPHOCYTES # BLD AUTO: 20.4 % — SIGNIFICANT CHANGE UP (ref 13–44)
MACROCYTES BLD QL: SLIGHT — SIGNIFICANT CHANGE UP
MAGNESIUM SERPL-MCNC: 2 MG/DL — SIGNIFICANT CHANGE UP (ref 1.6–2.6)
MANUAL SMEAR VERIFICATION: SIGNIFICANT CHANGE UP
MCHC RBC-ENTMCNC: 26.3 PG — LOW (ref 27–34)
MCHC RBC-ENTMCNC: 31.8 GM/DL — LOW (ref 32–36)
MCV RBC AUTO: 82.8 FL — SIGNIFICANT CHANGE UP (ref 80–100)
MONOCYTES # BLD AUTO: 0.67 K/UL — SIGNIFICANT CHANGE UP (ref 0–0.9)
MONOCYTES NFR BLD AUTO: 21 % — HIGH (ref 2–14)
NEUTROPHILS # BLD AUTO: 1.68 K/UL — LOW (ref 1.8–7.4)
NEUTROPHILS NFR BLD AUTO: 65.4 % — SIGNIFICANT CHANGE UP (ref 43–77)
NEUTS BAND # BLD: 2.9 % — SIGNIFICANT CHANGE UP (ref 0–6)
NRBC # BLD: 0 /100 WBCS — SIGNIFICANT CHANGE UP (ref 0–0)
NRBC # FLD: 0 K/UL — SIGNIFICANT CHANGE UP (ref 0–0)
OVALOCYTES BLD QL SMEAR: SIGNIFICANT CHANGE UP
PHOSPHATE SERPL-MCNC: 5.3 MG/DL — HIGH (ref 2.5–4.5)
PLAT MORPH BLD: NORMAL — SIGNIFICANT CHANGE UP
PLATELET # BLD AUTO: 137 K/UL — LOW (ref 150–400)
PLATELET COUNT - ESTIMATE: ABNORMAL
POIKILOCYTOSIS BLD QL AUTO: SIGNIFICANT CHANGE UP
POLYCHROMASIA BLD QL SMEAR: SLIGHT — SIGNIFICANT CHANGE UP
POTASSIUM SERPL-MCNC: 4.3 MMOL/L — SIGNIFICANT CHANGE UP (ref 3.5–5.3)
POTASSIUM SERPL-SCNC: 4.3 MMOL/L — SIGNIFICANT CHANGE UP (ref 3.5–5.3)
PROT ?TM UR-MCNC: 469 MG/DL — SIGNIFICANT CHANGE UP
PROT/CREAT UR-RTO: 3.4 RATIO — HIGH (ref 0–0.2)
RBC # BLD: 3.95 M/UL — LOW (ref 4.2–5.8)
RBC # FLD: 14.5 % — SIGNIFICANT CHANGE UP (ref 10.3–14.5)
RBC BLD AUTO: ABNORMAL
SMUDGE CELLS # BLD: PRESENT — SIGNIFICANT CHANGE UP
SODIUM SERPL-SCNC: 138 MMOL/L — SIGNIFICANT CHANGE UP (ref 135–145)
SODIUM UR-SCNC: 37 MMOL/L — SIGNIFICANT CHANGE UP
SPECIMEN SOURCE: SIGNIFICANT CHANGE UP
VARIANT LYMPHS # BLD: 2.9 % — SIGNIFICANT CHANGE UP (ref 0–6)
VIT B12 SERPL-MCNC: 1088 PG/ML — HIGH (ref 200–900)
WBC # BLD: 3.19 K/UL — LOW (ref 3.8–10.5)
WBC # FLD AUTO: 3.19 K/UL — LOW (ref 3.8–10.5)

## 2023-10-09 PROCEDURE — 99233 SBSQ HOSP IP/OBS HIGH 50: CPT | Mod: GC

## 2023-10-09 PROCEDURE — 76770 US EXAM ABDO BACK WALL COMP: CPT | Mod: 26

## 2023-10-09 PROCEDURE — 99232 SBSQ HOSP IP/OBS MODERATE 35: CPT

## 2023-10-09 RX ORDER — LABETALOL HCL 100 MG
200 TABLET ORAL ONCE
Refills: 0 | Status: COMPLETED | OUTPATIENT
Start: 2023-10-09 | End: 2023-10-09

## 2023-10-09 RX ADMIN — SODIUM CHLORIDE 100 MILLILITER(S): 9 INJECTION, SOLUTION INTRAVENOUS at 02:49

## 2023-10-09 RX ADMIN — Medication 100 MILLIGRAM(S): at 13:55

## 2023-10-09 RX ADMIN — HEPARIN SODIUM 5000 UNIT(S): 5000 INJECTION INTRAVENOUS; SUBCUTANEOUS at 14:08

## 2023-10-09 RX ADMIN — Medication 200 MILLIGRAM(S): at 22:53

## 2023-10-09 RX ADMIN — AMLODIPINE BESYLATE 10 MILLIGRAM(S): 2.5 TABLET ORAL at 22:53

## 2023-10-09 RX ADMIN — Medication 1 PATCH: at 19:03

## 2023-10-09 RX ADMIN — Medication 81 MILLIGRAM(S): at 13:54

## 2023-10-09 RX ADMIN — SODIUM CHLORIDE 100 MILLILITER(S): 9 INJECTION, SOLUTION INTRAVENOUS at 09:07

## 2023-10-09 RX ADMIN — Medication 200 MILLIGRAM(S): at 13:56

## 2023-10-09 RX ADMIN — Medication 1 PATCH: at 13:56

## 2023-10-09 RX ADMIN — Medication 200 MILLIGRAM(S): at 01:08

## 2023-10-09 RX ADMIN — Medication 200 MILLIGRAM(S): at 08:01

## 2023-10-09 RX ADMIN — HEPARIN SODIUM 5000 UNIT(S): 5000 INJECTION INTRAVENOUS; SUBCUTANEOUS at 08:01

## 2023-10-09 RX ADMIN — ATORVASTATIN CALCIUM 80 MILLIGRAM(S): 80 TABLET, FILM COATED ORAL at 22:53

## 2023-10-09 RX ADMIN — HEPARIN SODIUM 5000 UNIT(S): 5000 INJECTION INTRAVENOUS; SUBCUTANEOUS at 22:53

## 2023-10-09 NOTE — PHYSICAL THERAPY INITIAL EVALUATION ADULT - PERTINENT HX OF CURRENT PROBLEM, REHAB EVAL
Pt is a 78-year-old male presents to the ER complaining of progressively worsening bilateral lower extremity weakness. Pt found to have RADHA. Pt admitted for further evaluation and management

## 2023-10-09 NOTE — CONSULT NOTE ADULT - SUBJECTIVE AND OBJECTIVE BOX
[facility] DIVISION OF KIDNEY DISEASES AND HYPERTENSION -- [phone]  -- INITIAL CONSULT NOTE  --------------------------------------------------------------------------------  HPI:  Pt is a 78-year-old male with past medical history of CAD status post 1 stent, hyperlipidemia, diabetes, hypertension, A-fib no longer on anticoagulation reports only aspirin, CVA with no residual deficits, CKD,  presents to the ER complaining of progressively worsening bilateral lower extremity weakness x6 days. Pt states he feels unsteady when he is walking due to his legs feeling weak. He states symptoms have actually improved today but states he was still concerned. He denies any back pain, saddle anesthesia, LE numbness, urinary retention, urinary incontinence, bowel incontinence, leg pain, HA, dizziness/light headedness, visual changes, or falls. He states he does have some neck pain with some numbness that radiates down to both arms for which he states he has been going to PT for. He reports recent trip to [place] from 9/22-9/28, thinks he had a viral URI - was sneezing and had rhinorrhea. He denies any chest pain, SOB, abd pain, n/v/d, or urinary symptoms. He states he has had poor appetite.     In ED, vitals T98, HR 85, /89, RR 18, O2 sat 98% on RA  Labs significant for: wbc 2.99, Hb 9.9 - at baseline, BUN/Cr 74/6.12, trop 142-->132  EKG personally reviewed: sinus rhythm w/ 1st degree AV block, T wave inversions in V5-V6 unchanged from prior EKG 9/2022      Patient was examined at bedside in the ED. Patient reports he had recently gone to his nephrologist last Thursday and had labs drawn but does not know the lab results.     Reports he makes good urine and urinates 2-3 times per day. Denies any dysuria or increased urinary frequency,           PAST HISTORY  --------------------------------------------------------------------------------  PAST MEDICAL & SURGICAL HISTORY:  DM (diabetes mellitus)    Gout    HTN (hypertension)    BPH (benign prostatic hyperplasia)    Chronic kidney disease (CKD)    CVA (cerebrovascular accident)    Afib    H/O eye surgery      FAMILY HISTORY:  Family history of diabetes mellitus (Mother)    Family history of hypertension (Mother)      PAST SOCIAL HISTORY:  Lives at home with wife. Previous ambulating independently, however now using walker (08 Oct 2023 20:47)    ALLERGIES & MEDICATIONS  --------------------------------------------------------------------------------  Allergies  No Known Allergies      Intolerances    Standing Inpatient Medicationsallopurinol 100 milliGRAM(s) Oral daily  amLODIPine   Tablet 10 milliGRAM(s) Oral at bedtime  aspirin  chewable 81 milliGRAM(s) Oral daily  atorvastatin 80 milliGRAM(s) Oral at bedtime  cloNIDine Patch 0.1 mG/24Hr(s) 1 patch Transdermal every 7 days  dextrose 5%. 1000 milliLiter(s) IV Continuous <Continuous>  dextrose 5%. 1000 milliLiter(s) IV Continuous <Continuous>  dextrose 50% Injectable 25 Gram(s) IV Push once  dextrose 50% Injectable 12.5 Gram(s) IV Push once  dextrose 50% Injectable 25 Gram(s) IV Push once  glucagon  Injectable 1 milliGRAM(s) IntraMuscular once  heparin   Injectable 5000 Unit(s) SubCutaneous every 8 hours  insulin lispro (ADMELOG) corrective regimen sliding scale   SubCutaneous at bedtime  insulin lispro (ADMELOG) corrective regimen sliding scale   SubCutaneous three times a day before meals  labetalol 200 milliGRAM(s) Oral three times a day    PRN Inpatient Medicationsacetaminophen     Tablet .. 650 milliGRAM(s) Oral every 6 hours PRN  dextrose Oral Gel 15 Gram(s) Oral once PRN    REVIEW OF SYSTEMS  --------------------------------------------------------------------------------  Gen: No fevers/chills  Skin: No rashes  Head/Eyes/Ears: No HA  Respiratory: No dyspnea, cough  CV: No chest pain  GI: No abdominal pain, diarrhea  : No dysuria, hematuria  MSK: No edema  Heme: No easy bruising or bleeding  Psych: No significant depression    All other systems were reviewed and are negative, except as noted.    VITALS/PHYSICAL EXAM  --------------------------------------------------------------------------------  T(C): 36.8 (10-09-23 @ 03:40), Max: 37.4 (10-08-23 @ 19:26)  HR: 75 (10-09-23 @ 13:06) (74 - 80)  BP: 155/84 (10-09-23 @ 13:06) (155/84 - 181/86)  RR: 16 (10-09-23 @ 13:06) (16 - 18)  SpO2: 100% (10-09-23 @ 13:06) (100% - 100%)  Wt(kg): --  Height (cm): 175.3 (10-08-23 @ 12:33)  Weight (kg): 85 (10-08-23 @ 12:33)  BMI (kg/m2): 27.7 (10-08-23 @ 12:33)  BSA (m2): 2.01 (10-08-23 @ 12:33)    Physical Exam:  Gen: NAD  HEENT: MMM  Pulm: CTA B/L  CV: S1S2  Abd: Soft, +BS   Ext: No B/L LE edema  Neuro: Awake  Skin: Warm and dry  Vascular access:    LABS/STUDIES  --------------------------------------------------------------------------------              10.4   3.19  >-----------<  137      [10-09-23 @ 07:50]              32.7     138  |  106  |  76  ----------------------------<  102      [10-09-23 @ 07:50]  4.3   |  19  |  6.13        Ca     8.8     [10-09-23 @ 07:50]      Mg     2.00     [10-09-23 @ 07:50]      Phos  5.3     [10-09-23 @ 07:50]    TPro  6.7  /  Alb  3.3  /  TBili  0.3  /  DBili  x   /  AST  36  /  ALT  18  /  AlkPhos  57  [10-08-23 @ 14:21]    PT/INR: PT 11.2 , INR 1.00       [10-08-23 @ 14:21]  PTT: 30.8       [10-08-23 @ 14:21]          [10-09-23 @ 07:50]    Creatinine Trend:  SCr 6.13 [10-09 @ 07:50]  SCr 6.12 [10-08 @ 14:21]    Urine Creatinine 139      [10-09-23 @ 01:44]  Urine Protein 469      [10-09-23 @ 01:44]  Urine Sodium 37      [10-09-23 @ 01:44]      HIV Nonreact      [10-08-23 @ 14:21]     [facility] DIVISION OF KIDNEY DISEASES AND HYPERTENSION -- [phone]  -- INITIAL CONSULT NOTE  --------------------------------------------------------------------------------  HPI:  Pt is a 78-year-old male with past medical history of CAD status post 1 stent, hyperlipidemia, diabetes, hypertension, A-fib no longer on anticoagulation reports only aspirin, CVA with no residual deficits, CKD,  presents to the ER complaining of progressively worsening bilateral lower extremity weakness x6 days. Pt states he feels unsteady when he is walking due to his legs feeling weak. He states symptoms have actually improved today but states he was still concerned. He denies any back pain, saddle anesthesia, LE numbness, urinary retention, urinary incontinence, bowel incontinence, leg pain, HA, dizziness/light headedness, visual changes, or falls. He states he does have some neck pain with some numbness that radiates down to both arms for which he states he has been going to PT for. He reports recent trip to [place] from 9/22-9/28, thinks he had a viral URI - was sneezing and had rhinorrhea. He denies any chest pain, SOB, abd pain, n/v/d, or urinary symptoms. He states he has had poor appetite.     In ED, vitals T98, HR 85, /89, RR 18, O2 sat 98% on RA  Labs significant for: wbc 2.99, Hb 9.9 - at baseline, BUN/Cr 74/6.12, trop 142-->132  EKG personally reviewed: sinus rhythm w/ 1st degree AV block, T wave inversions in V5-V6 unchanged from prior EKG 9/2022      Patient was examined at bedside in the ED. Patient reports he had recently gone to his nephrologist (Dr. Ray Dobbs) last Thursday and had labs drawn but does not know the lab results.     Reports he makes good urine and urinates 2-3 times per day. Denies any dysuria or increased urinary frequency or urinary incontinence. No blood in the urine or diarrhea. Reports that he was decreased appetite but still is drinking water as normal.  Denies any NSAID use or recent changes in his medications. Denies any abdominal or leg swelling. Reports that he came to the hospital due to feeling unsteady on his feet. Of note CT shows possible NPH but is stable from last 1 year prior.     Labs are now significant for Cr of 6.13, up from 3.7 one year prior. Cr has steadily risen from 2021 to 2022 to now.         PAST HISTORY  --------------------------------------------------------------------------------  PAST MEDICAL & SURGICAL HISTORY:  DM (diabetes mellitus)    Gout    HTN (hypertension)    BPH (benign prostatic hyperplasia)    Chronic kidney disease (CKD)    CVA (cerebrovascular accident)    Afib    H/O eye surgery      FAMILY HISTORY:  Family history of diabetes mellitus (Mother)    Family history of hypertension (Mother)      PAST SOCIAL HISTORY:  Lives at home with wife. Previous ambulating independently, however now using walker (08 Oct 2023 20:47)    ALLERGIES & MEDICATIONS  --------------------------------------------------------------------------------  Allergies  No Known Allergies      Intolerances    Standing Inpatient Medicationsallopurinol 100 milliGRAM(s) Oral daily  amLODIPine   Tablet 10 milliGRAM(s) Oral at bedtime  aspirin  chewable 81 milliGRAM(s) Oral daily  atorvastatin 80 milliGRAM(s) Oral at bedtime  cloNIDine Patch 0.1 mG/24Hr(s) 1 patch Transdermal every 7 days  dextrose 5%. 1000 milliLiter(s) IV Continuous <Continuous>  dextrose 5%. 1000 milliLiter(s) IV Continuous <Continuous>  dextrose 50% Injectable 25 Gram(s) IV Push once  dextrose 50% Injectable 12.5 Gram(s) IV Push once  dextrose 50% Injectable 25 Gram(s) IV Push once  glucagon  Injectable 1 milliGRAM(s) IntraMuscular once  heparin   Injectable 5000 Unit(s) SubCutaneous every 8 hours  insulin lispro (ADMELOG) corrective regimen sliding scale   SubCutaneous at bedtime  insulin lispro (ADMELOG) corrective regimen sliding scale   SubCutaneous three times a day before meals  labetalol 200 milliGRAM(s) Oral three times a day    PRN Inpatient Medicationsacetaminophen     Tablet .. 650 milliGRAM(s) Oral every 6 hours PRN  dextrose Oral Gel 15 Gram(s) Oral once PRN    REVIEW OF SYSTEMS  --------------------------------------------------------------------------------  Gen: No fevers/chills  Skin: No rashes  Head/Eyes/Ears: No HA  Respiratory: No dyspnea, cough  CV: No chest pain  GI: No abdominal pain, diarrhea  : No dysuria, hematuria  MSK: No edema  Heme: No easy bruising or bleeding  Psych: No significant depression    All other systems were reviewed and are negative, except as noted.    VITALS/PHYSICAL EXAM  --------------------------------------------------------------------------------  T(C): 36.8 (10-09-23 @ 03:40), Max: 37.4 (10-08-23 @ 19:26)  HR: 75 (10-09-23 @ 13:06) (74 - 80)  BP: 155/84 (10-09-23 @ 13:06) (155/84 - 181/86)  RR: 16 (10-09-23 @ 13:06) (16 - 18)  SpO2: 100% (10-09-23 @ 13:06) (100% - 100%)  Wt(kg): --  Height (cm): 175.3 (10-08-23 @ 12:33)  Weight (kg): 85 (10-08-23 @ 12:33)  BMI (kg/m2): 27.7 (10-08-23 @ 12:33)  BSA (m2): 2.01 (10-08-23 @ 12:33)    Physical Exam:  Gen: NAD  HEENT: MMM  Pulm: CTA B/L  CV: S1S2  Abd: Soft, +BS   Ext: No B/L LE edema  Neuro: Awake  Skin: Warm and dry  Vascular access:    LABS/STUDIES  --------------------------------------------------------------------------------              10.4   3.19  >-----------<  137      [10-09-23 @ 07:50]              32.7     138  |  106  |  76  ----------------------------<  102      [10-09-23 @ 07:50]  4.3   |  19  |  6.13        Ca     8.8     [10-09-23 @ 07:50]      Mg     2.00     [10-09-23 @ 07:50]      Phos  5.3     [10-09-23 @ 07:50]    TPro  6.7  /  Alb  3.3  /  TBili  0.3  /  DBili  x   /  AST  36  /  ALT  18  /  AlkPhos  57  [10-08-23 @ 14:21]    PT/INR: PT 11.2 , INR 1.00       [10-08-23 @ 14:21]  PTT: 30.8       [10-08-23 @ 14:21]          [10-09-23 @ 07:50]    Creatinine Trend:  SCr 6.13 [10-09 @ 07:50]  SCr 6.12 [10-08 @ 14:21]    Urine Creatinine 139      [10-09-23 @ 01:44]  Urine Protein 469      [10-09-23 @ 01:44]  Urine Sodium 37      [10-09-23 @ 01:44]      HIV Nonreact      [10-08-23 @ 14:21]

## 2023-10-09 NOTE — CONSULT NOTE ADULT - ASSESSMENT
Note incomplete until attending attestation  Recommendations:  -  Pt is a 78-year-old male with past medical history of CAD status post 1 stent, hyperlipidemia, diabetes, hypertension, A-fib no longer on anticoagulation reports only aspirin, CVA with no residual deficits, CKD, p/w difficulty walking. Nephrology consulted for increased Cr compared to year prior.     #CKD  - Cr from last year was 3.7 that was increased from 2-2.3 in 2021.   - Labs now show Cr 6.13, BUN 76.   - Urine lytes showing elevated protein to cr ratio, likely iso CKD.    - Patient appears euvolemic on exam   - Per patient, saw Nephrologist Dr. Ray Dobbs [phone] recently  - Spoke with outpatient Nephrologist Dr. Ray Dobbs's office, labs were drawn on 9/28/23 - Cr of 5.78 and GFR 9, notes reports patient has CKD Stage 5.   - Elevation in Cr now is more likely to be due to progression of CKD rather than pure elevation from an RADHA, although there may be some multifactorial component. Unlikely to meet true definition of RADHA given there is 0.35 increase in Cr over a week at this time.  - Patient may be amenable to dialysis if necessary      Note incomplete until attending attestation  Recommendations:  - Changes in Cr likely chronic and progressive of CKD based on recent outpatient records.   - would check Vitamin D levels given CKD Stage 5  - continue to monitor Cr and urine output  - will continue to follow for any changes or urgent dialysis needs, but will likely ultimately require dialysis    Emilio Amaya MD  Internal Medicine PGY-1  Available on TEAMS       Pt is a 78-year-old male with past medical history of CAD status post 1 stent, hyperlipidemia, diabetes, hypertension, A-fib no longer on anticoagulation reports only aspirin, CVA with no residual deficits, CKD, p/w difficulty walking. Nephrology consulted for increased Cr compared to year prior.     #CKD  - Cr from last year was 3.7 that was increased from 2-2.3 in 2021.   - Labs now show Cr 6.13, BUN 76.   - Urine lytes showing elevated protein to cr ratio, likely iso CKD.    - Patient appears euvolemic on exam   - Per patient, saw Nephrologist Dr. Ray Dobbs [phone] recently  - Spoke with outpatient Nephrologist Dr. Ray Dobbs's office, labs were drawn on 9/28/23 - Cr of 5.78 and GFR 9, notes reports patient has CKD Stage 5.   - Elevation in Cr now is more likely to be due to progression of CKD rather than pure elevation from an RADHA, although there may be some multifactorial component. Unlikely to meet true definition of RADHA given there is 0.35 increase in Cr over a week at this time.  - Patient may be amenable to dialysis if necessary    Lei Risk Score:  Risk of Contrast-Induced Nephropathy 57.3%  Risk of Need for Dialysis	12.6%  One Year Mortality Risk	33%    Note incomplete until attending attestation  Recommendations:  - Changes in Cr likely chronic and progressive of CKD based on recent outpatient records.   - would check Vitamin D levels given CKD Stage 5  - continue to monitor Cr and urine output  - avoid nephrotoxic agents and contrast if possible  - will continue to follow for any changes or urgent dialysis needs, but will likely ultimately require dialysis    Emilio Amaya MD  Internal Medicine PGY-1  Available on TEAMS

## 2023-10-09 NOTE — PROGRESS NOTE ADULT - ASSESSMENT
Pt is a 78-year-old male with past medical history of CAD status post 1 stent, hyperlipidemia, diabetes, hypertension, A-fib no longer on anticoagulation reports only aspirin, CVA with no residual deficits, CKD,  presents to the ER complaining of progressively worsening bilateral lower extremity weakness. Pt found to have RADHA. Pt admitted for further evaluation and management

## 2023-10-09 NOTE — PROGRESS NOTE ADULT - PROBLEM SELECTOR PLAN 2
Cr elevated to 6.17, increased from 3.7 from 9/2022. Possible pre-renal in setting of poor PO intake. U/A w/ protein. Renal consulted - they spoke to his outpatient nephrologist. Believe this is from progression of CKD rather than RADHA. U/s showing Prostate: 4.4 x 4.3 x 4.4 cm. Protruding into the bladder neck, noted on the prior study.  - F/u renal recs   - Trend Cr.   - Avoid nephrotoxic medications.   - Bladder scan once he reaches the floor - currently in the ED.

## 2023-10-09 NOTE — PHYSICAL THERAPY INITIAL EVALUATION ADULT - ADDITIONAL COMMENTS
Patient lives with wife in apartment, + elevator access. Patient was independent prior to admission. Patient was using a walker prior to admission.

## 2023-10-09 NOTE — PROGRESS NOTE ADULT - SUBJECTIVE AND OBJECTIVE BOX
Hospitalist Progress Note  Cande Singh MD Pager # [phone]    OVERNIGHT EVENTS: GEO    SUBJECTIVE / INTERVAL HPI: Patient seen and examined at bedside. Pt reports that he has felt wobbly on his feet for 6 days. He feels like his strength is intact but feels unsteady/weak at the same time. No aches/pains anywhere. No dizziness/lightheadedness. ROS negative    VITAL SIGNS:  Vital Signs Last 24 Hrs  T(C): 36.8 (09 Oct 2023 03:40), Max: 37.4 (08 Oct 2023 19:26)  T(F): 98.3 (09 Oct 2023 03:40), Max: 99.3 (08 Oct 2023 19:26)  HR: 75 (09 Oct 2023 13:06) (74 - 80)  BP: 155/84 (09 Oct 2023 13:06) (155/84 - 181/86)  BP(mean): 113 (09 Oct 2023 08:05) (113 - 113)  RR: 16 (09 Oct 2023 13:06) (16 - 18)  SpO2: 100% (09 Oct 2023 13:06) (100% - 100%)    Parameters below as of 09 Oct 2023 13:06  Patient On (Oxygen Delivery Method): room air        PHYSICAL EXAM:    General: Comfortable - resting in bed   HEENT: NC/AT; PERRL, anicteric sclera; MMM  Neck: supple  Cardiovascular: +S1/S2; RRR  Respiratory: CTA B/L; no W/R/R  Gastrointestinal: soft, NT/ND; +BSx4  Extremities: WWP; no edema, clubbing or cyanosis  Vascular: 2+ radial, DP/PT pulses B/L  Neurological: AAOx3; no focal deficits- strength of his bilateral lower extremities are intact.     MEDICATIONS:  MEDICATIONS  (STANDING):  allopurinol 100 milliGRAM(s) Oral daily  amLODIPine   Tablet 10 milliGRAM(s) Oral at bedtime  aspirin  chewable 81 milliGRAM(s) Oral daily  atorvastatin 80 milliGRAM(s) Oral at bedtime  cloNIDine Patch 0.1 mG/24Hr(s) 1 patch Transdermal every 7 days  dextrose 5%. 1000 milliLiter(s) (100 mL/Hr) IV Continuous <Continuous>  dextrose 5%. 1000 milliLiter(s) (50 mL/Hr) IV Continuous <Continuous>  dextrose 50% Injectable 25 Gram(s) IV Push once  dextrose 50% Injectable 12.5 Gram(s) IV Push once  dextrose 50% Injectable 25 Gram(s) IV Push once  glucagon  Injectable 1 milliGRAM(s) IntraMuscular once  heparin   Injectable 5000 Unit(s) SubCutaneous every 8 hours  insulin lispro (ADMELOG) corrective regimen sliding scale   SubCutaneous at bedtime  insulin lispro (ADMELOG) corrective regimen sliding scale   SubCutaneous three times a day before meals  labetalol 200 milliGRAM(s) Oral three times a day    MEDICATIONS  (PRN):  acetaminophen     Tablet .. 650 milliGRAM(s) Oral every 6 hours PRN Temp greater or equal to 38C (100.4F), Mild Pain (1 - 3)  dextrose Oral Gel 15 Gram(s) Oral once PRN Blood Glucose LESS THAN 70 milliGRAM(s)/deciliter      ALLERGIES:  Allergies    No Known Allergies    Intolerances        LABS:                        10.4   3.19  )-----------( 137      ( 09 Oct 2023 07:50 )             32.7     10-09    138  |  106  |  76<H>  ----------------------------<  102<H>  4.3   |  19<L>  |  6.13<H>    Ca    8.8      09 Oct 2023 07:50  Phos  5.3     10-09  Mg     2.00     10-09    TPro  6.7  /  Alb  3.3  /  TBili  0.3  /  DBili  x   /  AST  36  /  ALT  18  /  AlkPhos  57  10-08    PT/INR - ( 08 Oct 2023 14:21 )   PT: 11.2 sec;   INR: 1.00 ratio         PTT - ( 08 Oct 2023 14:21 )  PTT:30.8 sec  Urinalysis Basic - ( 09 Oct 2023 07:50 )    Color: x / Appearance: x / SG: x / pH: x  Gluc: 102 mg/dL / Ketone: x  / Bili: x / Urobili: x   Blood: x / Protein: x / Nitrite: x   Leuk Esterase: x / RBC: x / WBC x   Sq Epi: x / Non Sq Epi: x / Bacteria: x      CAPILLARY BLOOD GLUCOSE      POCT Blood Glucose.: 117 mg/dL (09 Oct 2023 10:30)      RADIOLOGY & ADDITIONAL TESTS: Reviewed.    ASSESSMENT:    PLAN:

## 2023-10-09 NOTE — ED ADULT NURSE REASSESSMENT NOTE - NS ED NURSE REASSESS COMMENT FT1
Pt admitted to medicine. Pending inpatient bed assignment. No acute distress noted. Safety maintained.
Pt resting in stretcher, A&Ox4, respirations equal and unlabored. Pt offers no complaints. Repeat troponin drawn. IV patent. Pending further plan. No acute distress noted. Safety maintained, bed in lowest position, side rails raised, call bell inr eac
Report received from RN Luna. Pt A&Ox4, respirations equal and unlabored. Pt resting in stretcher at this time, offers no complaints. Provided with urinal for urine collection. IV patent. Pending CT scan. No acute distress noted. Safety maintained, bed in lowest position, side rails raised, call bell in reach.
report received from overnight RN. A&Ox3. non ambulatory. NAD. pt denies SOB, chest pain, dizziness, weakness, urinary symptoms, HA, n/v/d, fevers, chills. respirations ar even and un labored. safety precaution maintained. awaiting a bed assignment.
Admitted Pt received on stretcher from previous shift, A/Ox4 answer all questions appropriately  Pt denies chest pain and SOB during reassessment, breathing is even and unlabored on room air  Pt abdomin is soft and non distended, non tender to touch  Pt ambulates assisted by his walker, moves all four extremities on command, skin is intact  Pt not in any apparent distress at time of reassessment, resting comfortably at the bedside  Vital signs stable, NKA to medications  Pending bed assignment

## 2023-10-10 LAB
24R-OH-CALCIDIOL SERPL-MCNC: 17.4 NG/ML — LOW (ref 30–80)
ANION GAP SERPL CALC-SCNC: 16 MMOL/L — HIGH (ref 7–14)
ANISOCYTOSIS BLD QL: SIGNIFICANT CHANGE UP
BASOPHILS # BLD AUTO: 0.04 K/UL — SIGNIFICANT CHANGE UP (ref 0–0.2)
BASOPHILS NFR BLD AUTO: 0.8 % — SIGNIFICANT CHANGE UP (ref 0–2)
BUN SERPL-MCNC: 75 MG/DL — HIGH (ref 7–23)
BURR CELLS BLD QL SMEAR: PRESENT — SIGNIFICANT CHANGE UP
CA-I BLD-SCNC: 1.13 MMOL/L — LOW (ref 1.15–1.29)
CALCIUM SERPL-MCNC: 8.6 MG/DL — SIGNIFICANT CHANGE UP (ref 8.4–10.5)
CHLORIDE SERPL-SCNC: 106 MMOL/L — SIGNIFICANT CHANGE UP (ref 98–107)
CK SERPL-CCNC: 356 U/L — HIGH (ref 30–200)
CO2 SERPL-SCNC: 17 MMOL/L — LOW (ref 22–31)
CREAT SERPL-MCNC: 6.17 MG/DL — HIGH (ref 0.5–1.3)
EGFR: 9 ML/MIN/1.73M2 — LOW
EOSINOPHIL # BLD AUTO: 0.24 K/UL — SIGNIFICANT CHANGE UP (ref 0–0.5)
EOSINOPHIL NFR BLD AUTO: 4.9 % — SIGNIFICANT CHANGE UP (ref 0–6)
FERRITIN SERPL-MCNC: 1161 NG/ML — HIGH (ref 30–400)
GLUCOSE BLDC GLUCOMTR-MCNC: 123 MG/DL — HIGH (ref 70–99)
GLUCOSE SERPL-MCNC: 106 MG/DL — HIGH (ref 70–99)
HCT VFR BLD CALC: 30 % — LOW (ref 39–50)
HGB BLD-MCNC: 9.9 G/DL — LOW (ref 13–17)
IANC: 1.85 K/UL — SIGNIFICANT CHANGE UP (ref 1.8–7.4)
IMM GRANULOCYTES NFR BLD AUTO: 1.4 % — HIGH (ref 0–0.9)
IRON SATN MFR SERPL: 17 % — SIGNIFICANT CHANGE UP (ref 14–50)
IRON SATN MFR SERPL: 34 UG/DL — LOW (ref 45–165)
LG PLATELETS BLD QL AUTO: SLIGHT — SIGNIFICANT CHANGE UP
LYMPHOCYTES # BLD AUTO: 1.7 K/UL — SIGNIFICANT CHANGE UP (ref 1–3.3)
LYMPHOCYTES # BLD AUTO: 34.8 % — SIGNIFICANT CHANGE UP (ref 13–44)
MACROCYTES BLD QL: SLIGHT — SIGNIFICANT CHANGE UP
MAGNESIUM SERPL-MCNC: 2 MG/DL — SIGNIFICANT CHANGE UP (ref 1.6–2.6)
MANUAL SMEAR VERIFICATION: SIGNIFICANT CHANGE UP
MCHC RBC-ENTMCNC: 27.1 PG — SIGNIFICANT CHANGE UP (ref 27–34)
MCHC RBC-ENTMCNC: 33 GM/DL — SIGNIFICANT CHANGE UP (ref 32–36)
MCV RBC AUTO: 82.2 FL — SIGNIFICANT CHANGE UP (ref 80–100)
MONOCYTES # BLD AUTO: 0.98 K/UL — HIGH (ref 0–0.9)
MONOCYTES NFR BLD AUTO: 20.1 % — HIGH (ref 2–14)
NEUTROPHILS # BLD AUTO: 1.85 K/UL — SIGNIFICANT CHANGE UP (ref 1.8–7.4)
NEUTROPHILS NFR BLD AUTO: 38 % — LOW (ref 43–77)
NRBC # BLD: 0 /100 WBCS — SIGNIFICANT CHANGE UP (ref 0–0)
NRBC # FLD: 0 K/UL — SIGNIFICANT CHANGE UP (ref 0–0)
OVALOCYTES BLD QL SMEAR: SLIGHT — SIGNIFICANT CHANGE UP
PHOSPHATE SERPL-MCNC: 5.2 MG/DL — HIGH (ref 2.5–4.5)
PLAT MORPH BLD: ABNORMAL
PLATELET # BLD AUTO: 87 K/UL — LOW (ref 150–400)
PLATELET CLUMP BLD QL SMEAR: SIGNIFICANT CHANGE UP
PLATELET COUNT - ESTIMATE: ABNORMAL
POIKILOCYTOSIS BLD QL AUTO: SIGNIFICANT CHANGE UP
POTASSIUM SERPL-MCNC: 4.3 MMOL/L — SIGNIFICANT CHANGE UP (ref 3.5–5.3)
POTASSIUM SERPL-SCNC: 4.3 MMOL/L — SIGNIFICANT CHANGE UP (ref 3.5–5.3)
PTH-INTACT FLD-MCNC: 324 PG/ML — HIGH (ref 15–65)
RBC # BLD: 3.65 M/UL — LOW (ref 4.2–5.8)
RBC # BLD: 3.65 M/UL — LOW (ref 4.2–5.8)
RBC # FLD: 14.3 % — SIGNIFICANT CHANGE UP (ref 10.3–14.5)
RBC BLD AUTO: ABNORMAL
RETICS #: 35 K/UL — SIGNIFICANT CHANGE UP (ref 25–125)
RETICS/RBC NFR: 1 % — SIGNIFICANT CHANGE UP (ref 0.5–2.5)
SODIUM SERPL-SCNC: 139 MMOL/L — SIGNIFICANT CHANGE UP (ref 135–145)
TIBC SERPL-MCNC: 197 UG/DL — LOW (ref 220–430)
TSH SERPL-MCNC: 2.57 UIU/ML — SIGNIFICANT CHANGE UP (ref 0.27–4.2)
UIBC SERPL-MCNC: 163 UG/DL — SIGNIFICANT CHANGE UP (ref 110–370)
VIT D25+D1,25 OH+D1,25 PNL SERPL-MCNC: 16.9 PG/ML — LOW (ref 19.9–79.3)
WBC # BLD: 4.88 K/UL — SIGNIFICANT CHANGE UP (ref 3.8–10.5)
WBC # FLD AUTO: 4.88 K/UL — SIGNIFICANT CHANGE UP (ref 3.8–10.5)

## 2023-10-10 PROCEDURE — 99232 SBSQ HOSP IP/OBS MODERATE 35: CPT

## 2023-10-10 PROCEDURE — 99233 SBSQ HOSP IP/OBS HIGH 50: CPT | Mod: GC

## 2023-10-10 RX ORDER — NIFEDIPINE 30 MG
60 TABLET, EXTENDED RELEASE 24 HR ORAL DAILY
Refills: 0 | Status: DISCONTINUED | OUTPATIENT
Start: 2023-10-10 | End: 2023-10-12

## 2023-10-10 RX ORDER — CHLORHEXIDINE GLUCONATE 213 G/1000ML
1 SOLUTION TOPICAL DAILY
Refills: 0 | Status: DISCONTINUED | OUTPATIENT
Start: 2023-10-10 | End: 2023-10-12

## 2023-10-10 RX ORDER — INFLUENZA VIRUS VACCINE 15; 15; 15; 15 UG/.5ML; UG/.5ML; UG/.5ML; UG/.5ML
0.7 SUSPENSION INTRAMUSCULAR ONCE
Refills: 0 | Status: DISCONTINUED | OUTPATIENT
Start: 2023-10-10 | End: 2023-10-12

## 2023-10-10 RX ORDER — ERGOCALCIFEROL 1.25 MG/1
50000 CAPSULE ORAL
Refills: 0 | Status: DISCONTINUED | OUTPATIENT
Start: 2023-10-10 | End: 2023-10-10

## 2023-10-10 RX ADMIN — Medication 100 MILLIGRAM(S): at 12:08

## 2023-10-10 RX ADMIN — CHLORHEXIDINE GLUCONATE 1 APPLICATION(S): 213 SOLUTION TOPICAL at 12:09

## 2023-10-10 RX ADMIN — HEPARIN SODIUM 5000 UNIT(S): 5000 INJECTION INTRAVENOUS; SUBCUTANEOUS at 13:43

## 2023-10-10 RX ADMIN — Medication 1 PATCH: at 07:54

## 2023-10-10 RX ADMIN — Medication 81 MILLIGRAM(S): at 12:08

## 2023-10-10 RX ADMIN — Medication 200 MILLIGRAM(S): at 05:40

## 2023-10-10 RX ADMIN — Medication 200 MILLIGRAM(S): at 13:43

## 2023-10-10 RX ADMIN — HEPARIN SODIUM 5000 UNIT(S): 5000 INJECTION INTRAVENOUS; SUBCUTANEOUS at 05:39

## 2023-10-10 NOTE — PROGRESS NOTE ADULT - SUBJECTIVE AND OBJECTIVE BOX
Hospitalist Progress Note  Cande Singh MD Pager # [phone]    OVERNIGHT EVENTS:    SUBJECTIVE / INTERVAL HPI: Patient seen and examined at bedside.     VITAL SIGNS:  Vital Signs Last 24 Hrs  T(C): 36.7 (10 Oct 2023 05:30), Max: 37 (09 Oct 2023 22:30)  T(F): 98 (10 Oct 2023 05:30), Max: 98.6 (09 Oct 2023 22:30)  HR: 74 (10 Oct 2023 05:30) (74 - 75)  BP: 132/74 (10 Oct 2023 05:30) (132/74 - 181/86)  BP(mean): --  RR: 17 (10 Oct 2023 05:30) (16 - 17)  SpO2: 100% (10 Oct 2023 05:30) (100% - 100%)    Parameters below as of 10 Oct 2023 05:30  Patient On (Oxygen Delivery Method): room air        PHYSICAL EXAM:    General: WDWN  HEENT: NC/AT; PERRL, anicteric sclera; MMM  Neck: supple  Cardiovascular: +S1/S2; RRR  Respiratory: CTA B/L; no W/R/R  Gastrointestinal: soft, NT/ND; +BSx4  Extremities: WWP; no edema, clubbing or cyanosis  Vascular: 2+ radial, DP/PT pulses B/L  Neurological: AAOx3; no focal deficits    MEDICATIONS:  MEDICATIONS  (STANDING):  allopurinol 100 milliGRAM(s) Oral daily  aspirin  chewable 81 milliGRAM(s) Oral daily  atorvastatin 80 milliGRAM(s) Oral at bedtime  cloNIDine Patch 0.1 mG/24Hr(s) 1 patch Transdermal every 7 days  dextrose 5%. 1000 milliLiter(s) (100 mL/Hr) IV Continuous <Continuous>  dextrose 5%. 1000 milliLiter(s) (50 mL/Hr) IV Continuous <Continuous>  dextrose 50% Injectable 25 Gram(s) IV Push once  dextrose 50% Injectable 12.5 Gram(s) IV Push once  dextrose 50% Injectable 25 Gram(s) IV Push once  glucagon  Injectable 1 milliGRAM(s) IntraMuscular once  heparin   Injectable 5000 Unit(s) SubCutaneous every 8 hours  influenza  Vaccine (HIGH DOSE) 0.7 milliLiter(s) IntraMuscular once  insulin lispro (ADMELOG) corrective regimen sliding scale   SubCutaneous at bedtime  insulin lispro (ADMELOG) corrective regimen sliding scale   SubCutaneous three times a day before meals  labetalol 200 milliGRAM(s) Oral three times a day  NIFEdipine XL 60 milliGRAM(s) Oral daily    MEDICATIONS  (PRN):  acetaminophen     Tablet .. 650 milliGRAM(s) Oral every 6 hours PRN Temp greater or equal to 38C (100.4F), Mild Pain (1 - 3)  dextrose Oral Gel 15 Gram(s) Oral once PRN Blood Glucose LESS THAN 70 milliGRAM(s)/deciliter      ALLERGIES:  Allergies    No Known Allergies    Intolerances        LABS:                        9.9    4.88  )-----------( x        ( 10 Oct 2023 07:09 )             30.0     10-10    139  |  106  |  75<H>  ----------------------------<  106<H>  4.3   |  17<L>  |  6.17<H>    Ca    8.6      10 Oct 2023 07:09  Phos  5.2     10-10  Mg     2.00     10-10    TPro  6.7  /  Alb  3.3  /  TBili  0.3  /  DBili  x   /  AST  36  /  ALT  18  /  AlkPhos  57  10-08    PT/INR - ( 08 Oct 2023 14:21 )   PT: 11.2 sec;   INR: 1.00 ratio         PTT - ( 08 Oct 2023 14:21 )  PTT:30.8 sec  Urinalysis Basic - ( 10 Oct 2023 07:09 )    Color: x / Appearance: x / SG: x / pH: x  Gluc: 106 mg/dL / Ketone: x  / Bili: x / Urobili: x   Blood: x / Protein: x / Nitrite: x   Leuk Esterase: x / RBC: x / WBC x   Sq Epi: x / Non Sq Epi: x / Bacteria: x      CAPILLARY BLOOD GLUCOSE      POCT Blood Glucose.: 123 mg/dL (10 Oct 2023 08:54)      RADIOLOGY & ADDITIONAL TESTS: Reviewed.    ASSESSMENT:    PLAN:  Hospitalist Progress Note  Cande Singh MD Pager # [phone]    OVERNIGHT EVENTS: GEO    SUBJECTIVE / INTERVAL HPI: Patient seen and examined at bedside. Pt. reports that he continues to feel like his legs feel tired but his arms also feel tired. He does not feel fatigued though. He denies weakness. No dizziness or lightheadedness.     VITAL SIGNS:  Vital Signs Last 24 Hrs  T(C): 36.7 (10 Oct 2023 05:30), Max: 37 (09 Oct 2023 22:30)  T(F): 98 (10 Oct 2023 05:30), Max: 98.6 (09 Oct 2023 22:30)  HR: 74 (10 Oct 2023 05:30) (74 - 75)  BP: 132/74 (10 Oct 2023 05:30) (132/74 - 181/86)  BP(mean): --  RR: 17 (10 Oct 2023 05:30) (16 - 17)  SpO2: 100% (10 Oct 2023 05:30) (100% - 100%)    Parameters below as of 10 Oct 2023 05:30  Patient On (Oxygen Delivery Method): room air        PHYSICAL EXAM:    General: Comfortable appearing and resting in bed   HEENT: NC/AT; PERRL, anicteric sclera; MMM  Neck: supple  Cardiovascular: +S1/S2; RRR  Respiratory: CTA B/L; no W/R/R  Gastrointestinal: soft, NT/ND; +BSx4  Extremities: WWP; no edema, clubbing or cyanosis  Vascular: 2+ radial, DP/PT pulses B/L  Neurological: AAOx3; no focal deficits - strong in all extremities.     MEDICATIONS:  MEDICATIONS  (STANDING):  allopurinol 100 milliGRAM(s) Oral daily  aspirin  chewable 81 milliGRAM(s) Oral daily  atorvastatin 80 milliGRAM(s) Oral at bedtime  cloNIDine Patch 0.1 mG/24Hr(s) 1 patch Transdermal every 7 days  dextrose 5%. 1000 milliLiter(s) (100 mL/Hr) IV Continuous <Continuous>  dextrose 5%. 1000 milliLiter(s) (50 mL/Hr) IV Continuous <Continuous>  dextrose 50% Injectable 25 Gram(s) IV Push once  dextrose 50% Injectable 12.5 Gram(s) IV Push once  dextrose 50% Injectable 25 Gram(s) IV Push once  glucagon  Injectable 1 milliGRAM(s) IntraMuscular once  heparin   Injectable 5000 Unit(s) SubCutaneous every 8 hours  influenza  Vaccine (HIGH DOSE) 0.7 milliLiter(s) IntraMuscular once  insulin lispro (ADMELOG) corrective regimen sliding scale   SubCutaneous at bedtime  insulin lispro (ADMELOG) corrective regimen sliding scale   SubCutaneous three times a day before meals  labetalol 200 milliGRAM(s) Oral three times a day  NIFEdipine XL 60 milliGRAM(s) Oral daily    MEDICATIONS  (PRN):  acetaminophen     Tablet .. 650 milliGRAM(s) Oral every 6 hours PRN Temp greater or equal to 38C (100.4F), Mild Pain (1 - 3)  dextrose Oral Gel 15 Gram(s) Oral once PRN Blood Glucose LESS THAN 70 milliGRAM(s)/deciliter      ALLERGIES:  Allergies    No Known Allergies    Intolerances        LABS:                        9.9    4.88  )-----------( x        ( 10 Oct 2023 07:09 )             30.0     10-10    139  |  106  |  75<H>  ----------------------------<  106<H>  4.3   |  17<L>  |  6.17<H>    Ca    8.6      10 Oct 2023 07:09  Phos  5.2     10-10  Mg     2.00     10-10    TPro  6.7  /  Alb  3.3  /  TBili  0.3  /  DBili  x   /  AST  36  /  ALT  18  /  AlkPhos  57  10-08    PT/INR - ( 08 Oct 2023 14:21 )   PT: 11.2 sec;   INR: 1.00 ratio         PTT - ( 08 Oct 2023 14:21 )  PTT:30.8 sec  Urinalysis Basic - ( 10 Oct 2023 07:09 )    Color: x / Appearance: x / SG: x / pH: x  Gluc: 106 mg/dL / Ketone: x  / Bili: x / Urobili: x   Blood: x / Protein: x / Nitrite: x   Leuk Esterase: x / RBC: x / WBC x   Sq Epi: x / Non Sq Epi: x / Bacteria: x      CAPILLARY BLOOD GLUCOSE      POCT Blood Glucose.: 123 mg/dL (10 Oct 2023 08:54)      RADIOLOGY & ADDITIONAL TESTS: Reviewed.    ASSESSMENT:    PLAN:

## 2023-10-10 NOTE — PROGRESS NOTE ADULT - ASSESSMENT
Pt is a 78-year-old male with past medical history of CAD status post 1 stent, hyperlipidemia, diabetes, hypertension, A-fib no longer on anticoagulation reports only aspirin, CVA with no residual deficits, CKD, p/w difficulty walking. Nephrology consulted for increased Cr compared to year prior.     #CKD Stage 5  - Cr from last year was 3.7 that was increased from 2-2.3 in 2021.   - Labs now show Cr 6.13, BUN 76.   - Progression is likely iso suboptimal HTN control with DM.   - Per patient, saw Nephrologist Dr. Ray Dobbs [phone] ~ 1 month ago  - Spoke with outpatient Nephrologist Dr. Ray Dobbs's office, labs were drawn on 9/28/23 - Cr of 5.78 and GFR 9, notes reports patient has CKD Stage 5.   - Patient may be amenable to dialysis if absolutely necessary, but prefers to avoid. Was pending further discussion with Dr. Dobbs after labs were obtained.     Lei Risk Score:  Risk of Contrast-Induced Nephropathy 57.3%  Risk of Need for Dialysis	12.6%  One Year Mortality Risk	33%    #Hypertension   - /85, started now on Nifedipine and on Labetalol.    #Nephrotic Range Proteinuria  - likely iso CKD  - Can f/u serology with C3/C4, AJ, Hep B and Hep C if not already done by Dr. Dobbs    #Secondary Hyperparathyroidism  - Phos elevated to 5.3  - Would start with dietary restriction, consider starting phos binders if persistently elevated >5.5.  - iCal 1.13 - low, although less likely to be contributing to overall picture of leg weakness given lack of other signs of hypocalcemia, likely chronic in nature due to CKD    Note incomplete until attending attestation  Recommendations:  - f/u Dr. Ray Dobbs for any further work-up required for CKD. Would require further discussion with Dr. Dobbs regarding dialysis requirements, patient is amenable if that is the only option but prefers to remain off dialysis.   - dietary phos restriction  - c/w nifedipine and labetalol for HTN control  - continue to monitor Cr and urine output  - avoid nephrotoxic agents and contrast if possible    Emilio Amaya MD  Internal Medicine PGY-1  Available on TEAMS       Pt is a 78-year-old male with past medical history of CAD status post 1 stent, hyperlipidemia, diabetes, hypertension, A-fib no longer on anticoagulation reports only aspirin, CVA with no residual deficits, CKD, p/w difficulty walking. Nephrology consulted for increased Cr compared to year prior.     #CKD Stage 5  - Cr from last year was 3.7 that was increased from 2-2.3 in 2021.   - Labs now show Cr 6.13, BUN 76. Today relatively stable to 6.17  - Progression is likely iso suboptimal HTN control with DM.   - Per patient, saw Nephrologist Dr. Ray Dobbs [phone] ~ 1 month ago  - Spoke with outpatient Nephrologist Dr. Ray Dobbs's office, labs were drawn on 9/28/23 - Cr of 5.78 and GFR 9, notes reports patient has CKD Stage 5.   - Patient may be amenable to dialysis if absolutely necessary, but prefers to avoid. Was pending further discussion with Dr. Dobbs after labs were obtained.     Lei Risk Score:  Risk of Contrast-Induced Nephropathy 57.3%  Risk of Need for Dialysis	12.6%  One Year Mortality Risk	33%    #Hypertension   - /85, started now on Nifedipine and on Labetalol.    #Nephrotic Range Proteinuria  - likely iso CKD  - Can f/u serology with C3/C4, AJ, Hep B and Hep C if not already done by Dr. Dobbs    #Secondary Hyperparathyroidism  - Phos elevated to 5.3  - Would start with dietary restriction, consider starting phos binders if persistently elevated >5.5.  - iCal 1.13 - low, although less likely to be contributing to overall picture of leg weakness given lack of other signs of hypocalcemia, likely chronic in nature due to CKD    Note incomplete until attending attestation  Recommendations:  - f/u Dr. Ray Dobbs for any further work-up required for CKD. Would require further discussion with Dr. Dobbs regarding dialysis requirements, patient is amenable if that is the only option but prefers to remain off dialysis.   - dietary phos restriction  - c/w nifedipine and labetalol for HTN control  - continue to monitor Cr and urine output  - avoid nephrotoxic agents and contrast if possible    Emilio Amaya MD  Internal Medicine PGY-1  Available on TEAMS       Pt is a 78-year-old male with past medical history of CAD status post 1 stent, hyperlipidemia, diabetes, hypertension, A-fib no longer on anticoagulation reports only aspirin, CVA with no residual deficits, CKD, p/w difficulty walking. Nephrology consulted for increased Cr compared to year prior.     #CKD Stage 5  - Cr from last year was 3.7 that was increased from 2-2.3 in 2021.   - Labs now show Cr 6.13, BUN 76. Today relatively stable to 6.17  - Progression is likely iso suboptimal HTN control with DM.   - Per patient, saw Nephrologist Dr. Ray Dobbs [phone] ~ 1 month ago  - Spoke with outpatient Nephrologist Dr. Ray Dobbs's office, labs were drawn on 9/28/23 - Cr of 5.78 and GFR 9, notes reports patient has CKD Stage 5.   - Patient may be amenable to dialysis if absolutely necessary, but prefers to avoid. Was pending further discussion with Dr. Dobbs after labs were obtained.     Lei Risk Score:  Risk of Contrast-Induced Nephropathy 57.3%  Risk of Need for Dialysis	12.6%  One Year Mortality Risk	33%    #Hypertension   - /85, started now on Nifedipine and on Labetalol.    #Nephrotic Range Proteinuria  - likely iso CKD  - Can f/u serology with C3/C4, AJ, Hep B and Hep C if not already done by Dr. Dobbs    #Secondary Hyperparathyroidism  - Phos elevated to 5.3, , Vitamin D 25-OH 17.4  - Would start with dietary restriction, consider starting phos binders if persistently elevated >5.5.  - iCal 1.13 - low, although less likely to be contributing to overall picture of leg weakness given lack of other signs of hypocalcemia, likely chronic in nature due to CKD      Note incomplete until attending attestation  Recommendations:  - f/u Dr. Ray Dobbs for any further work-up required for CKD. Would require further discussion with Dr. Dobbs regarding dialysis requirements, patient is amenable if that is the only option but prefers to remain off dialysis.   - dietary phos restriction, would correct phos before supplementing Vitamin D as it can worsen Hyperphosphatemia and hypocalcemia  - c/w nifedipine and labetalol for HTN control  - continue to monitor Cr and urine output  - avoid nephrotoxic agents and contrast if possible    Emilio Amaya MD  Internal Medicine PGY-1  Available on TEAMS

## 2023-10-10 NOTE — PROGRESS NOTE ADULT - PROBLEM SELECTOR PLAN 2
Cr elevated to 6.17, increased from 3.7 from 9/2022. Possible pre-renal in setting of poor PO intake. U/A w/ protein. Renal consulted - they spoke to his outpatient nephrologist. Believe this is from progression of CKD rather than RADHA. U/s showing Prostate: 4.4 x 4.3 x 4.4 cm. Protruding into the bladder neck, noted on the prior study.  - F/u renal recs   - Trend Cr.   - Avoid nephrotoxic medications.   - Bladder scan once he reaches the floor - currently in the ED. Cr elevated to 6.17, increased from 3.7 from 9/2022. Possible pre-renal in setting of poor PO intake. U/A w/ protein. Renal consulted - they spoke to his outpatient nephrologist. Believe this is from progression of CKD rather than RADHA. U/s showing Prostate: 4.4 x 4.3 x 4.4 cm. Protruding into the bladder neck, noted on the prior study.  - F/u renal recs   - Trend Cr.   - Avoid nephrotoxic medications.   - Discussed with renal, no plan for dialysis on this admission but will likely need dialysis in the future. Will reach out to outpatient nephrologist to discuss placing AVF while inpatient. Will call in AM.

## 2023-10-10 NOTE — PATIENT PROFILE ADULT - FALL HARM RISK - HARM RISK INTERVENTIONS

## 2023-10-10 NOTE — PROGRESS NOTE ADULT - SUBJECTIVE AND OBJECTIVE BOX
[facility] Division of Kidney Diseases & Hypertension  FOLLOW UP NOTE  [phone]--------------------------------------------------------------------------------  Chief Complaint:Unsteadiness on feet        24 hour events/subjective:   Overnight - hypertensive to 163/85. Started on nifedipine. No other acute events overnight. Reports good urine output. PVR noted to be 36 at 1AM. Reports continue leg weakness but denies any acute worsening.         PAST HISTORY  --------------------------------------------------------------------------------  No significant changes to PMH, PSH, FHx, SHx, unless otherwise noted    ALLERGIES & MEDICATIONS  --------------------------------------------------------------------------------  Allergies    No Known Allergies    Intolerances      Standing Inpatient Medications  allopurinol 100 milliGRAM(s) Oral daily  aspirin  chewable 81 milliGRAM(s) Oral daily  atorvastatin 80 milliGRAM(s) Oral at bedtime  cloNIDine Patch 0.1 mG/24Hr(s) 1 patch Transdermal every 7 days  dextrose 5%. 1000 milliLiter(s) IV Continuous <Continuous>  dextrose 5%. 1000 milliLiter(s) IV Continuous <Continuous>  dextrose 50% Injectable 25 Gram(s) IV Push once  dextrose 50% Injectable 12.5 Gram(s) IV Push once  dextrose 50% Injectable 25 Gram(s) IV Push once  glucagon  Injectable 1 milliGRAM(s) IntraMuscular once  heparin   Injectable 5000 Unit(s) SubCutaneous every 8 hours  influenza  Vaccine (HIGH DOSE) 0.7 milliLiter(s) IntraMuscular once  insulin lispro (ADMELOG) corrective regimen sliding scale   SubCutaneous three times a day before meals  insulin lispro (ADMELOG) corrective regimen sliding scale   SubCutaneous at bedtime  labetalol 200 milliGRAM(s) Oral three times a day  NIFEdipine XL 60 milliGRAM(s) Oral daily    PRN Inpatient Medications  acetaminophen     Tablet .. 650 milliGRAM(s) Oral every 6 hours PRN  dextrose Oral Gel 15 Gram(s) Oral once PRN      REVIEW OF SYSTEMS  --------------------------------------------------------------------------------  Gen: No fevers/chills  Skin: No rashes  Head/Eyes/Ears/Mouth: No headache  Respiratory: No dyspnea, cough  CV: No chest pain  GI: No abdominal pain, diarrhea, constipation, nausea, vomiting  : No increased frequency, dysuria, hematuria, nocturia  MSK: No joint pain, no edema  Neuro: No dizziness/lightheadedness, weakness    All other systems were reviewed and are negative, except as noted.    VITALS/PHYSICAL EXAM  --------------------------------------------------------------------------------  T(C): 36.7 (10-10-23 @ 05:30), Max: 37 (10-09-23 @ 22:30)  HR: 74 (10-10-23 @ 05:30) (74 - 75)  BP: 132/74 (10-10-23 @ 05:30) (132/74 - 181/86)  RR: 17 (10-10-23 @ 05:30) (16 - 17)  SpO2: 100% (10-10-23 @ 05:30) (100% - 100%)  Wt(kg): --  Height (cm): 175.3 (10-08-23 @ 12:33)  Weight (kg): 85 (10-08-23 @ 12:33)  BMI (kg/m2): 27.7 (10-08-23 @ 12:33)  BSA (m2): 2.01 (10-08-23 @ 12:33)    Physical Exam:  Gen: NAD, well-appearing  HEENT: PERRL, clear oropharynx  Pulm: CTA B/L  CV: RRR, S1S2;  Back: No spinal or CVA tenderness  Abd: +BS, soft, nontender/nondistended  : No suprapubic tenderness  Extremities: no bilateral LE edema noted  Neuro: No focal deficits, intact gait  Skin: Warm, without rashes      LABS/STUDIES  --------------------------------------------------------------------------------              9.9    4.88  >-----------<  x        [10-10-23 @ 07:09]              30.0     138  |  106  |  76  ----------------------------<  102      [10-09-23 @ 07:50]  4.3   |  19  |  6.13        Ca     8.8     [10-09-23 @ 07:50]      iCa    1.13     [10-10 @ 07:09]      Mg     2.00     [10-09-23 @ 07:50]      Phos  5.3     [10-09-23 @ 07:50]    TPro  6.7  /  Alb  3.3  /  TBili  0.3  /  DBili  x   /  AST  36  /  ALT  18  /  AlkPhos  57  [10-08-23 @ 14:21]    PT/INR: PT 11.2 , INR 1.00       [10-08-23 @ 14:21]  PTT: 30.8       [10-08-23 @ 14:21]          [10-09-23 @ 07:50]    Creatinine Trend:  SCr 6.13 [10-09 @ 07:50]  SCr 6.12 [10-08 @ 14:21]    Urinalysis - [10-09-23 @ 07:50]      Color  / Appearance  / SG  / pH       Gluc 102 / Ketone   / Bili  / Urobili        Blood  / Protein  / Leuk Est  / Nitrite       RBC  / WBC  / Hyaline  / Gran  / Sq Epi  / Non Sq Epi  / Bacteria     Urine Creatinine 139      [10-09-23 @ 01:44]  Urine Protein 469      [10-09-23 @ 01:44]  Urine Sodium 37      [10-09-23 @ 01:44]      HIV Nonreact      [10-08-23 @ 14:21]

## 2023-10-11 LAB
ANION GAP SERPL CALC-SCNC: 16 MMOL/L — HIGH (ref 7–14)
BASOPHILS # BLD AUTO: 0.03 K/UL — SIGNIFICANT CHANGE UP (ref 0–0.2)
BASOPHILS NFR BLD AUTO: 0.5 % — SIGNIFICANT CHANGE UP (ref 0–2)
BUN SERPL-MCNC: 80 MG/DL — HIGH (ref 7–23)
C3 SERPL-MCNC: 114 MG/DL — SIGNIFICANT CHANGE UP (ref 90–180)
C4 SERPL-MCNC: 54 MG/DL — HIGH (ref 10–40)
CA-I BLD-SCNC: 1.18 MMOL/L — SIGNIFICANT CHANGE UP (ref 1.15–1.29)
CALCIUM SERPL-MCNC: 8.5 MG/DL — SIGNIFICANT CHANGE UP (ref 8.4–10.5)
CHLORIDE SERPL-SCNC: 107 MMOL/L — SIGNIFICANT CHANGE UP (ref 98–107)
CO2 SERPL-SCNC: 16 MMOL/L — LOW (ref 22–31)
CREAT SERPL-MCNC: 6.27 MG/DL — HIGH (ref 0.5–1.3)
EGFR: 8 ML/MIN/1.73M2 — LOW
EOSINOPHIL # BLD AUTO: 0.1 K/UL — SIGNIFICANT CHANGE UP (ref 0–0.5)
EOSINOPHIL NFR BLD AUTO: 1.6 % — SIGNIFICANT CHANGE UP (ref 0–6)
GLUCOSE BLDC GLUCOMTR-MCNC: 120 MG/DL — HIGH (ref 70–99)
GLUCOSE BLDC GLUCOMTR-MCNC: 121 MG/DL — HIGH (ref 70–99)
GLUCOSE BLDC GLUCOMTR-MCNC: 149 MG/DL — HIGH (ref 70–99)
GLUCOSE SERPL-MCNC: 115 MG/DL — HIGH (ref 70–99)
HAV IGM SER-ACNC: SIGNIFICANT CHANGE UP
HBV CORE IGM SER-ACNC: SIGNIFICANT CHANGE UP
HBV SURFACE AG SER-ACNC: SIGNIFICANT CHANGE UP
HCT VFR BLD CALC: 31.3 % — LOW (ref 39–50)
HCV AB S/CO SERPL IA: 0.1 S/CO — SIGNIFICANT CHANGE UP (ref 0–0.99)
HCV AB SERPL-IMP: SIGNIFICANT CHANGE UP
HGB BLD-MCNC: 10 G/DL — LOW (ref 13–17)
IANC: 2.91 K/UL — SIGNIFICANT CHANGE UP (ref 1.8–7.4)
IMM GRANULOCYTES NFR BLD AUTO: 1 % — HIGH (ref 0–0.9)
KAPPA LC SER QL IFE: 36.85 MG/DL — HIGH (ref 0.33–1.94)
KAPPA/LAMBDA FREE LIGHT CHAIN RATIO, SERUM: 1.45 RATIO — SIGNIFICANT CHANGE UP (ref 0.26–1.65)
LAMBDA LC SER QL IFE: 25.49 MG/DL — HIGH (ref 0.57–2.63)
LYMPHOCYTES # BLD AUTO: 2.44 K/UL — SIGNIFICANT CHANGE UP (ref 1–3.3)
LYMPHOCYTES # BLD AUTO: 39.1 % — SIGNIFICANT CHANGE UP (ref 13–44)
MAGNESIUM SERPL-MCNC: 2.3 MG/DL — SIGNIFICANT CHANGE UP (ref 1.6–2.6)
MCHC RBC-ENTMCNC: 26.4 PG — LOW (ref 27–34)
MCHC RBC-ENTMCNC: 31.9 GM/DL — LOW (ref 32–36)
MCV RBC AUTO: 82.6 FL — SIGNIFICANT CHANGE UP (ref 80–100)
MONOCYTES # BLD AUTO: 0.7 K/UL — SIGNIFICANT CHANGE UP (ref 0–0.9)
MONOCYTES NFR BLD AUTO: 11.2 % — SIGNIFICANT CHANGE UP (ref 2–14)
NEUTROPHILS # BLD AUTO: 2.91 K/UL — SIGNIFICANT CHANGE UP (ref 1.8–7.4)
NEUTROPHILS NFR BLD AUTO: 46.6 % — SIGNIFICANT CHANGE UP (ref 43–77)
NRBC # BLD: 0 /100 WBCS — SIGNIFICANT CHANGE UP (ref 0–0)
NRBC # FLD: 0 K/UL — SIGNIFICANT CHANGE UP (ref 0–0)
PHOSPHATE SERPL-MCNC: 5.5 MG/DL — HIGH (ref 2.5–4.5)
PLATELET # BLD AUTO: 70 K/UL — LOW (ref 150–400)
POTASSIUM SERPL-MCNC: 4.5 MMOL/L — SIGNIFICANT CHANGE UP (ref 3.5–5.3)
POTASSIUM SERPL-SCNC: 4.5 MMOL/L — SIGNIFICANT CHANGE UP (ref 3.5–5.3)
RBC # BLD: 3.79 M/UL — LOW (ref 4.2–5.8)
RBC # FLD: 14.6 % — HIGH (ref 10.3–14.5)
SODIUM SERPL-SCNC: 139 MMOL/L — SIGNIFICANT CHANGE UP (ref 135–145)
WBC # BLD: 6.24 K/UL — SIGNIFICANT CHANGE UP (ref 3.8–10.5)
WBC # FLD AUTO: 6.24 K/UL — SIGNIFICANT CHANGE UP (ref 3.8–10.5)

## 2023-10-11 PROCEDURE — 99233 SBSQ HOSP IP/OBS HIGH 50: CPT

## 2023-10-11 PROCEDURE — 99223 1ST HOSP IP/OBS HIGH 75: CPT

## 2023-10-11 PROCEDURE — 99232 SBSQ HOSP IP/OBS MODERATE 35: CPT | Mod: GC

## 2023-10-11 PROCEDURE — 93975 VASCULAR STUDY: CPT | Mod: 26

## 2023-10-11 RX ADMIN — Medication 100 MILLIGRAM(S): at 13:28

## 2023-10-11 RX ADMIN — Medication 1 PATCH: at 19:30

## 2023-10-11 RX ADMIN — ATORVASTATIN CALCIUM 80 MILLIGRAM(S): 80 TABLET, FILM COATED ORAL at 21:14

## 2023-10-11 RX ADMIN — HEPARIN SODIUM 5000 UNIT(S): 5000 INJECTION INTRAVENOUS; SUBCUTANEOUS at 21:15

## 2023-10-11 RX ADMIN — Medication 60 MILLIGRAM(S): at 05:40

## 2023-10-11 RX ADMIN — Medication 200 MILLIGRAM(S): at 21:14

## 2023-10-11 RX ADMIN — Medication 200 MILLIGRAM(S): at 13:28

## 2023-10-11 RX ADMIN — HEPARIN SODIUM 5000 UNIT(S): 5000 INJECTION INTRAVENOUS; SUBCUTANEOUS at 13:27

## 2023-10-11 RX ADMIN — HEPARIN SODIUM 5000 UNIT(S): 5000 INJECTION INTRAVENOUS; SUBCUTANEOUS at 05:42

## 2023-10-11 RX ADMIN — CHLORHEXIDINE GLUCONATE 1 APPLICATION(S): 213 SOLUTION TOPICAL at 13:27

## 2023-10-11 RX ADMIN — Medication 81 MILLIGRAM(S): at 13:27

## 2023-10-11 RX ADMIN — Medication 200 MILLIGRAM(S): at 05:40

## 2023-10-11 RX ADMIN — Medication 1 PATCH: at 07:00

## 2023-10-11 NOTE — CONSULT NOTE ADULT - SUBJECTIVE AND OBJECTIVE BOX
VASCULAR SURGERY CONSULT NOTE  --------------------------------------------------------------------------------------------      HPI:   78-year-old male with past medical history of CAD status post 1 stent, hyperlipidemia, diabetes, hypertension, A-fib no longer on anticoagulation reports only aspirin, CVA with no residual deficits, CKD stage 5,  presents to the ER complaining of progressively worsening bilateral lower extremity weakness x6 days. Pt states he feels unsteady when he is walking due to his legs feeling weak. He states symptoms have actually improved today but states he was still concerned. He denies any back pain, saddle anesthesia, LE numbness, urinary retention, urinary incontinence, bowel incontinence, leg pain, HA, dizziness/light headedness, visual changes, or falls. He states he does have some neck pain with some numbness that radiates down to both arms for which he states he has been going to PT for. He reports recent trip to [place] from 9/22-9/28, thinks he had a viral URI - was sneezing and had rhinorrhea. He denies any chest pain, SOB, abd pain, n/v/d, or urinary symptoms. He states he has had poor appetite.     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 Patient reports no pain upon ambulation, and no pain upon exertion. Reports no vascular surgical interventions in the past and no prior surgeries. Patient with palpable fem/pop pulses and palpable pedal pulses and without any gross motor or sensory symptoms.          PAST MEDICAL & SURGICAL HISTORY:  DM (diabetes mellitus)      Gout      HTN (hypertension)      BPH (benign prostatic hyperplasia)      Chronic kidney disease (CKD)      CVA (cerebrovascular accident)      Afib      H/O eye surgery        FAMILY HISTORY:  Family history of diabetes mellitus (Mother)    Family history of hypertension (Mother)      [] Family history not pertinent as reviewed with the patient and family    SOCIAL HISTORY:  n/a    ALLERGIES: No Known Allergies      HOME MEDICATIONS:  n/a    CURRENT MEDICATIONS  MEDICATIONS (STANDING): allopurinol 100 milliGRAM(s) Oral daily  aspirin  chewable 81 milliGRAM(s) Oral daily  atorvastatin 80 milliGRAM(s) Oral at bedtime  cloNIDine Patch 0.1 mG/24Hr(s) 1 patch Transdermal every 7 days  dextrose 5%. 1000 milliLiter(s) IV Continuous <Continuous>  dextrose 5%. 1000 milliLiter(s) IV Continuous <Continuous>  dextrose 50% Injectable 25 Gram(s) IV Push once  dextrose 50% Injectable 12.5 Gram(s) IV Push once  dextrose 50% Injectable 25 Gram(s) IV Push once  glucagon  Injectable 1 milliGRAM(s) IntraMuscular once  heparin   Injectable 5000 Unit(s) SubCutaneous every 8 hours  influenza  Vaccine (HIGH DOSE) 0.7 milliLiter(s) IntraMuscular once  labetalol 200 milliGRAM(s) Oral three times a day  NIFEdipine XL 60 milliGRAM(s) Oral daily    MEDICATIONS (PRN):acetaminophen     Tablet .. 650 milliGRAM(s) Oral every 6 hours PRN Temp greater or equal to 38C (100.4F), Mild Pain (1 - 3)  dextrose Oral Gel 15 Gram(s) Oral once PRN Blood Glucose LESS THAN 70 milliGRAM(s)/deciliter    --------------------------------------------------------------------------------------------    Vitals:   T(C): 36.3 (10-11-23 @ 05:30), Max: 36.6 (10-10-23 @ 21:10)  HR: 65 (10-11-23 @ 05:30) (62 - 67)  BP: 122/59 (10-11-23 @ 05:30) (122/59 - 164/62)  RR: 17 (10-11-23 @ 05:30) (16 - 17)  SpO2: 100% (10-11-23 @ 05:30) (100% - 100%)  CAPILLARY BLOOD GLUCOSE      POCT Blood Glucose.: 120 mg/dL (11 Oct 2023 12:18)  POCT Blood Glucose.: 116 mg/dL (10 Oct 2023 21:24)    CAPILLARY BLOOD GLUCOSE      POCT Blood Glucose.: 120 mg/dL (11 Oct 2023 12:18)  POCT Blood Glucose.: 116 mg/dL (10 Oct 2023 21:24)      Height (cm): 175.3 (10-08 @ 12:33)  Weight (kg): 85 (10-08 @ 12:33)  BMI (kg/m2): 27.7 (10-08 @ 12:33)  BSA (m2): 2.01 (10-08 @ 12:33)    PHYSICAL EXAM:   General: NAD, Lying in bed comfortably  Neuro: A+Ox3  GI/Abd: Soft, NT/ND, no rebound/guarding, no masses palpated  Vascular: All 4 extremities warm, B/l radial pulses palpable,b/l Femoral/popliteal pulse palpable,  b/l DP/PT palpable.  Skin: Intact, no breakdown  --------------------------------------------------------------------------------------------    LABS  CBC (10-11 @ 06:10)                              10.0<L>                         6.24    )----------------(  70<L>      46.6  % Neutrophils, 39.1  % Lymphocytes, ANC: 2.91                                31.3<L>  CBC (10-10 @ 07:09)                              9.9<L>                         4.88    )----------------(  87<L>      38.0<L>% Neutrophils, 34.8  % Lymphocytes, ANC: 1.85                                30.0<L>    BMP (10-11 @ 06:10)             139     |  107     |  80<H> 		Ca++ 1.18    Ca 8.5                ---------------------------------( 115<H>		Mg 2.30               4.5     |  16<L>   |  6.27<H>			Ph 5.5<H>  BMP (10-10 @ 07:09)             139     |  106     |  75<H> 		Ca++ 1.13<L>  Ca 8.6                ---------------------------------( 106<H>		Mg 2.00               4.3     |  17<L>   |  6.17<H>			Ph 5.2<H>        Cardiac Markers (10-10 @ 07:09)     HSTrop: -- / CKMB: -- / CK: 356        --------------------------------------------------------------------------------------------    MICROBIOLOGY  Urinalysis (10-11 @ 06:10):     Color:  / Appearance:  / SG:  / pH:  / Gluc: 115<H> / Ketones:  / Bili:  / Urobili:  / Protein : / Nitrites:  / Leuk.Est:  / RBC:  / WBC:  / Sq Epi:  / Non Sq Epi:  / Bacteria        -> Clean Catch Clean Catch (Midstream) Culture (10-08 @ 15:40)     NG    NG    No growth      --------------------------------------------------------------------------------------------    IMAGING  < from: [facility] (10.11.23 @ 08:07) >  CONCLUSIONS:      1. A focal, partially thrombosed, fusiform aneurysmal dilatation is visualized at the infrarenalsegment of the abdominal aorta (not well visualized, measures ~2.5 cm).   2. The visualized walls of the distal aorta appear echogenic, likely attributed to atherosclerotic calcification.   3. No evidence of hemodynamically significant stenosis notedin the proximal right and left renal arteries.   4. Increased parenchymal echogenicity and elevated resistive indices could be suggestive of medical renal disease.    --------------------------------------------------------------------------------  NOTIFICATIONS:  The findings of today's test was reported, on 10/11/2023 at 1:27:41 PM.         --------------------------------------------------------------------------------  RENAL MEASUREMENTS:    +------------+------------+-----------+  |Right Kidney|            |Left Kidney|  +------------+------------+-----------+  |  12.3 cm   |[id number]|  10.5 cm  |  +------------+------------+-----------+  +----------+---------+------------+----------+---------+  | Systolic |Diastolic|Renal Artery| Systolic |Diastolic|  +----------+---------+------------+----------+---------+  |135.2 cm/s|14.5 cm/s|  Proximal  |80.2 cm/s |14.4 cm/s|  +----------+---------+------------+----------+---------+  |143.9 cm/s|14.5 cm/s|    Mid     |125.8 cm/s|18.1 cm/s|  +----------+---------+------------+----------+---------+  |75.3 cm/s |5.0 cm/s |   Distal   |87.5 cm/s |16.3 cm/s|  +----------+---------+------------+----------+---------+  |          |         |Interlobular|          |         |  +----------+---------+------------+----------+---------+  |53.4 cm/s |11.7 cm/s|    Mid     |77.0 cm/s |10.8 cm/s|  +----------+---------+------------+----------+---------+    AORTA MEASUREMENTS:  +-----+----+---+-------+--------------+-----------+----------+---------+  |     |PSV |AP |Lateral|Characteristic| Waveform  |Resistance|  Flow   |  |     |cm/s|cm |  cm   |              |           |          |         |  +-----+----+---+-------+--------------+-----------+----------+---------+  |Aorta|68.8|2.6|       |   Calcific   |multiphasic|   high   |Antegrade|  |Prox |    |   |       |              |           |resistance|         |  +-----+----+---+-------+--------------+-----------+----------+---------+  |Aorta|58.1|2.3|  2.5  |  Aneurysmal  |multiphasic|   high   |Antegrade|  |Mid  |    |   |       | Fusiform and |           |resistance|         |  |     |    |   |       |   Calcific   |           |          |         |  +-----+----+---+-------+--------------+-----------+----------+---------+  |Aorta|65.1|2.5|       | Calcific and |           |          |         |  |Dista|    |   |       |     Poor     |           |          |         |  |l    |    |   |       |Visualization |           |          |         |  +-----+----+---+-------+--------------+-----------+----------+---------+       --------------------------------------------------------------------------------  FINDINGS:  Right Renal Vessels:  Elevated Resistive Indices. Color duplex evaluation of the right renal arteries indicates less than 60% renal artery stenosis. Increased parenchymal echogenicity and elevated resistive indices could be suggestive of medical renal disease. Correlate clinically. Normal phasic flow through the right renal vein with no obvious thrombosis noted.    Left Renal Vessels:  Elevated Resistive Indices. Color duplex evaluation of the left renal arteries indicates less than 60% renal artery stenosis. Increased parenchymal echogenicity and elevated resistive indices could be suggestive of medical renal disease. Correlate clinically. Normal phasic flow through the left renal vein with no obvious thrombosis noted.    Abdominal Aorta Artery Findings:  A partially thrombosed fusiform aneurysmal dilatation is visualized at the infrarenal segment of the abdominal aorta. The visualized walls of the distal aorta appear echogenic, likely attributed to atherosclerotic calcification.         Electronically signed on 10/11/2023 at 1:31:27 PM by Jesus Hearn MD, PhD, FACC, FASE, RPVI           *** Final ***    < end of copied text >

## 2023-10-11 NOTE — PROGRESS NOTE ADULT - SUBJECTIVE AND OBJECTIVE BOX
Hospitalist Progress Note  Cande Singh MD Pager # [phone]    OVERNIGHT EVENTS: GEO    SUBJECTIVE / INTERVAL HPI: Patient seen and examined at bedside. Pt reports that he feels less wobbly on his feet and feels like he is walking better today. He recently had a flu like illness when he was in [place] but otherwise he has no other illnesses. Overall feels well. He said that he does not want to go to rehab.     VITAL SIGNS:  Vital Signs Last 24 Hrs  T(C): 36.3 (11 Oct 2023 05:30), Max: 36.6 (10 Oct 2023 21:10)  T(F): 97.4 (11 Oct 2023 05:30), Max: 97.9 (10 Oct 2023 21:10)  HR: 65 (11 Oct 2023 05:30) (62 - 67)  BP: 122/59 (11 Oct 2023 05:30) (122/59 - 164/62)  BP(mean): --  RR: 17 (11 Oct 2023 05:30) (16 - 17)  SpO2: 100% (11 Oct 2023 05:30) (100% - 100%)    Parameters below as of 11 Oct 2023 05:30  Patient On (Oxygen Delivery Method): room air        PHYSICAL EXAM:    General: Comfortable and resting in bed   HEENT: NC/AT; PERRL, anicteric sclera; MMM  Neck: supple  Cardiovascular: +S1/S2; RRR  Respiratory: CTA B/L; no W/R/R  Gastrointestinal: soft, NT/ND; +BSx4  Extremities: WWP; no edema, clubbing or cyanosis  Vascular: 2+ radial, DP/PT pulses B/L  Neurological: AAOx3; no focal deficits. Strength is 5/5 in all extremities. Gait, takes small steps but otherwise stable on his feet     MEDICATIONS:  MEDICATIONS  (STANDING):  allopurinol 100 milliGRAM(s) Oral daily  aspirin  chewable 81 milliGRAM(s) Oral daily  atorvastatin 80 milliGRAM(s) Oral at bedtime  chlorhexidine 2% Cloths 1 Application(s) Topical daily  cloNIDine Patch 0.1 mG/24Hr(s) 1 patch Transdermal every 7 days  dextrose 5%. 1000 milliLiter(s) (100 mL/Hr) IV Continuous <Continuous>  dextrose 5%. 1000 milliLiter(s) (50 mL/Hr) IV Continuous <Continuous>  dextrose 50% Injectable 25 Gram(s) IV Push once  dextrose 50% Injectable 12.5 Gram(s) IV Push once  dextrose 50% Injectable 25 Gram(s) IV Push once  glucagon  Injectable 1 milliGRAM(s) IntraMuscular once  heparin   Injectable 5000 Unit(s) SubCutaneous every 8 hours  influenza  Vaccine (HIGH DOSE) 0.7 milliLiter(s) IntraMuscular once  labetalol 200 milliGRAM(s) Oral three times a day  NIFEdipine XL 60 milliGRAM(s) Oral daily    MEDICATIONS  (PRN):  acetaminophen     Tablet .. 650 milliGRAM(s) Oral every 6 hours PRN Temp greater or equal to 38C (100.4F), Mild Pain (1 - 3)  dextrose Oral Gel 15 Gram(s) Oral once PRN Blood Glucose LESS THAN 70 milliGRAM(s)/deciliter      ALLERGIES:  Allergies    No Known Allergies    Intolerances        LABS:                        10.0   6.24  )-----------( 70       ( 11 Oct 2023 06:10 )             31.3     10-11    139  |  107  |  80<H>  ----------------------------<  115<H>  4.5   |  16<L>  |  6.27<H>    Ca    8.5      11 Oct 2023 06:10  Phos  5.5     10-11  Mg     2.30     10-11        Urinalysis Basic - ( 11 Oct 2023 06:10 )    Color: x / Appearance: x / SG: x / pH: x  Gluc: 115 mg/dL / Ketone: x  / Bili: x / Urobili: x   Blood: x / Protein: x / Nitrite: x   Leuk Esterase: x / RBC: x / WBC x   Sq Epi: x / Non Sq Epi: x / Bacteria: x      CAPILLARY BLOOD GLUCOSE      POCT Blood Glucose.: 120 mg/dL (11 Oct 2023 12:18)      RADIOLOGY & ADDITIONAL TESTS: Reviewed.    ASSESSMENT:    PLAN:

## 2023-10-11 NOTE — CONSULT NOTE ADULT - ASSESSMENT
ASSESSMENT: Patient is a 78y old m with ****    PLAN:   - no acute vascular surgery intervention indicated   -   -   -   - To be discussed     Vascular Surgery  [id number]  ASSESSMENT: 78-year-old male with past medical history of CAD status post 1 stent, hyperlipidemia, diabetes, hypertension, A-fib no longer on anticoagulation reports only aspirin, CVA with no residual deficits, CKD stage 5,  presents to the ER complaining of progressively worsening bilateral lower extremity weakness x6 days. 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

## 2023-10-11 NOTE — PROGRESS NOTE ADULT - PROBLEM SELECTOR PLAN 2
Cr elevated to 6.17 --6.27, increased from 3.7 from 9/2022. Possible pre-renal in setting of poor PO intake. U/A w/ protein. Renal consulted - they spoke to his outpatient nephrologist. Believe this is from progression of CKD rather than RADHA. U/s showing Prostate: 4.4 x 4.3 x 4.4 cm. Protruding into the bladder neck, noted on the prior study.  - F/u renal recs   - Trend Cr.   - Avoid nephrotoxic medications.   - Discussed with renal, no plan for dialysis on this admission but will likely need dialysis in the future. Will attempt to reach out to outpatient nephrologist.   - Renal artery u/s results:  A focal, partially thrombosed, fusiform aneurysmal dilatation is visualized at the infrarenal segment of the abdominal aorta (not well visualized, measures ~2.5 cm). Vascular surgery consulted and renal made aware. Would need CT angio to evaluate further but kidney function is poor and would likely need dialysis if he receives contrast. F/u renal and vascular recommendations.

## 2023-10-11 NOTE — PROGRESS NOTE ADULT - ASSESSMENT
Pt is a 78-year-old male with past medical history of CAD status post 1 stent, hyperlipidemia, diabetes, hypertension, A-fib no longer on anticoagulation reports only aspirin, CVA with no residual deficits, CKD, p/w difficulty walking. Nephrology consulted for increased Cr compared to year prior.     #CKD Stage 5  - Cr from last year was 3.7 that was increased from 2-2.3 in 2021.   - Labs now show Cr 6.13 ==> 6.17 ==> 6.27  - Progression is likely iso suboptimal HTN control with DM.   - Per patient, saw Nephrologist Dr. Ray Dobbs [phone] ~ 1 month ago  - Spoke with outpatient Nephrologist Dr. Ray Dobbs's office, labs were drawn on 9/28/23 - Cr of 5.78 and GFR 9, notes reports patient has CKD Stage 5.   - Patient may be amenable to dialysis if absolutely necessary, but prefers to avoid. Was pending further discussion with Dr. Dobbs after labs were obtained.   - Overall high risk for renal failure if contrast is used    #Hypertension   - /85, started now on Nifedipine and on Labetalol.    #Nephrotic Range Proteinuria  - likely iso CKD  - Can f/u serology with C3/C4, AJ, Hep B and Hep C if not already done by Dr. Dobbs    #Secondary Hyperparathyroidism  - Phos elevated to 5.3, , Vitamin D 25-OH 17.4  - Would start with dietary restriction, consider starting phos binders if persistently elevated >5.5.  - iCal 1.18     Note incomplete until attending attestation  Recommendations:  - f/u Dr. Ray Dobbs if pt would need an AVF placed during this hospitalization, otherwise no acute need for dialysis initiation currently.   - c/w nifedipine and labetalol for HTN control  - continue to monitor Cr and urine output  - avoid nephrotoxic agents and contrast if possible    Emilio Amaya MD  Internal Medicine PGY-1  Available on TEAMS       Pt is a 78-year-old male with past medical history of CAD status post 1 stent, hyperlipidemia, diabetes, hypertension, A-fib no longer on anticoagulation reports only aspirin, CVA with no residual deficits, CKD, p/w difficulty walking. Nephrology consulted for increased Cr compared to year prior.     #CKD Stage 5  - Cr from last year was 3.7 that was increased from 2-2.3 in 2021.   - Labs now show Cr 6.13 ==> 6.17 ==> 6.27  - Progression is likely iso suboptimal HTN control with DM.   - Per patient, saw Nephrologist Dr. Ray Dobbs [phone] ~ 1 month ago  - Spoke with outpatient Nephrologist Dr. Ray Dobbs's office, labs were drawn on 9/28/23 - Cr of 5.78 and GFR 9, notes reports patient has CKD Stage 5.   - Patient may be amenable to dialysis if absolutely necessary, but prefers to avoid. Was pending further discussion with Dr. Dobbs after labs were obtained.   - Overall high risk for renal failure if contrast is used    #Hypertension   - /85, started now on Nifedipine and on Labetalol.    #Nephrotic Range Proteinuria  - likely iso CKD  - Can f/u serology with C3/C4, AJ, Hep B and Hep C if not already done by Dr. Dobbs    #Secondary Hyperparathyroidism  - Phos elevated to 5.3, , Vitamin D 25-OH 17.4  - Would start with dietary restriction, consider starting phos binders if persistently elevated >5.5.  - iCal 1.18     Note incomplete until attending attestation  Recommendations:  - f/u Dr. Ray Dobbs if pt would need an AVF placed during this hospitalization, otherwise no acute need for dialysis initiation currently. Attempted to call x3 today with not response.   - c/w nifedipine and labetalol for HTN control  - continue to monitor Cr and urine output  - avoid nephrotoxic agents and contrast if possible    Emilio Amaya MD  Internal Medicine PGY-1  Available on TEAMS

## 2023-10-11 NOTE — PROGRESS NOTE ADULT - SUBJECTIVE AND OBJECTIVE BOX
[facility] Division of Kidney Diseases & Hypertension  FOLLOW UP NOTE  [phone]--------------------------------------------------------------------------------  Chief Complaint:Unsteadiness on feet        24 hour events/subjective:        PAST HISTORY  --------------------------------------------------------------------------------  No significant changes to PMH, PSH, FHx, SHx, unless otherwise noted    ALLERGIES & MEDICATIONS  --------------------------------------------------------------------------------  Allergies    No Known Allergies    Intolerances      Standing Inpatient Medications  allopurinol 100 milliGRAM(s) Oral daily  aspirin  chewable 81 milliGRAM(s) Oral daily  atorvastatin 80 milliGRAM(s) Oral at bedtime  chlorhexidine 2% Cloths 1 Application(s) Topical daily  cloNIDine Patch 0.1 mG/24Hr(s) 1 patch Transdermal every 7 days  dextrose 5%. 1000 milliLiter(s) IV Continuous <Continuous>  dextrose 5%. 1000 milliLiter(s) IV Continuous <Continuous>  dextrose 50% Injectable 25 Gram(s) IV Push once  dextrose 50% Injectable 12.5 Gram(s) IV Push once  dextrose 50% Injectable 25 Gram(s) IV Push once  glucagon  Injectable 1 milliGRAM(s) IntraMuscular once  heparin   Injectable 5000 Unit(s) SubCutaneous every 8 hours  influenza  Vaccine (HIGH DOSE) 0.7 milliLiter(s) IntraMuscular once  labetalol 200 milliGRAM(s) Oral three times a day  NIFEdipine XL 60 milliGRAM(s) Oral daily    PRN Inpatient Medications  acetaminophen     Tablet .. 650 milliGRAM(s) Oral every 6 hours PRN  dextrose Oral Gel 15 Gram(s) Oral once PRN      REVIEW OF SYSTEMS  --------------------------------------------------------------------------------  Gen: No fevers/chills  Skin: No rashes  Head/Eyes/Ears/Mouth: No headache  Respiratory: No dyspnea, cough  CV: No chest pain  GI: No abdominal pain, diarrhea, constipation, nausea, vomiting  : No increased frequency, dysuria, hematuria, nocturia  MSK: No joint pain, no edema  Neuro: No dizziness/lightheadedness, weakness    All other systems were reviewed and are negative, except as noted.    VITALS/PHYSICAL EXAM  --------------------------------------------------------------------------------  T(C): 36.3 (10-11-23 @ 05:30), Max: 36.6 (10-10-23 @ 21:10)  HR: 65 (10-11-23 @ 05:30) (62 - 67)  BP: 122/59 (10-11-23 @ 05:30) (122/59 - 167/74)  RR: 17 (10-11-23 @ 05:30) (16 - 17)  SpO2: 100% (10-11-23 @ 05:30) (100% - 100%)  Wt(kg): --      Physical Exam:  Gen: NAD, well-appearing  HEENT: PERRL, supple neck, clear oropharynx  Pulm: CTA B/L  CV: RRR, S1S2;  Back: No spinal or CVA tenderness  Abd: +BS, soft, nontender/nondistended  : No suprapubic tenderness  Extremities: no bilateral LE edema noted  Neuro: No focal deficits, intact gait  Skin: Warm, without rashes  Vascular access:    LABS/STUDIES  --------------------------------------------------------------------------------              10.0   6.24  >-----------<  70       [10-11-23 @ 06:10]              31.3     139  |  107  |  80  ----------------------------<  115      [10-11-23 @ 06:10]  4.5   |  16  |  6.27        Ca     8.5     [10-11-23 @ 06:10]      iCa    1.18     [10-11 @ 06:10]      Mg     2.30     [10-11-23 @ 06:10]      Phos  5.5     [10-11-23 @ 06:10]            [10-10-23 @ 07:09]    Creatinine Trend:  SCr 6.27 [10-11 @ 06:10]  SCr 6.17 [10-10 @ 07:09]  SCr 6.13 [10-09 @ 07:50]  SCr 6.12 [10-08 @ 14:21]    Urinalysis - [10-11-23 @ 06:10]      Color  / Appearance  / SG  / pH       Gluc 115 / Ketone   / Bili  / Urobili        Blood  / Protein  / Leuk Est  / Nitrite       RBC  / WBC  / Hyaline  / Gran  / Sq Epi  / Non Sq Epi  / Bacteria     Urine Creatinine 139      [10-09-23 @ 01:44]  Urine Protein 469      [10-09-23 @ 01:44]  Urine Sodium 37      [10-09-23 @ 01:44]    Iron 34, TIBC 197, %sat 17      [10-10-23 @ 07:09]  Ferritin 1161      [10-10-23 @ 07:09]  PTH -- (Ca --)      [10-10-23 @ 07:09]   324  Vitamin D (25OH) 17.4      [10-10-23 @ 07:09]  TSH 2.57      [10-10-23 @ 07:09]    HIV Nonreact      [10-08-23 @ 14:21]    C3 Complement 114      [10-11-23 @ 06:10]  C4 Complement 54      [10-11-23 @ 06:10]   [facility] Division of Kidney Diseases & Hypertension  FOLLOW UP NOTE  [phone]--------------------------------------------------------------------------------  Chief Complaint:Unsteadiness on feet        24 hour events/subjective: No acute overnight events. Denies any SOB, fever, chills, N/V, chest pain, or changes in urinary frequency or output.         PAST HISTORY  --------------------------------------------------------------------------------  No significant changes to PMH, PSH, FHx, SHx, unless otherwise noted    ALLERGIES & MEDICATIONS  --------------------------------------------------------------------------------  Allergies    No Known Allergies    Intolerances      Standing Inpatient Medications  allopurinol 100 milliGRAM(s) Oral daily  aspirin  chewable 81 milliGRAM(s) Oral daily  atorvastatin 80 milliGRAM(s) Oral at bedtime  chlorhexidine 2% Cloths 1 Application(s) Topical daily  cloNIDine Patch 0.1 mG/24Hr(s) 1 patch Transdermal every 7 days  dextrose 5%. 1000 milliLiter(s) IV Continuous <Continuous>  dextrose 5%. 1000 milliLiter(s) IV Continuous <Continuous>  dextrose 50% Injectable 25 Gram(s) IV Push once  dextrose 50% Injectable 12.5 Gram(s) IV Push once  dextrose 50% Injectable 25 Gram(s) IV Push once  glucagon  Injectable 1 milliGRAM(s) IntraMuscular once  heparin   Injectable 5000 Unit(s) SubCutaneous every 8 hours  influenza  Vaccine (HIGH DOSE) 0.7 milliLiter(s) IntraMuscular once  labetalol 200 milliGRAM(s) Oral three times a day  NIFEdipine XL 60 milliGRAM(s) Oral daily    PRN Inpatient Medications  acetaminophen     Tablet .. 650 milliGRAM(s) Oral every 6 hours PRN  dextrose Oral Gel 15 Gram(s) Oral once PRN      REVIEW OF SYSTEMS  --------------------------------------------------------------------------------  Gen: No fevers/chills  Skin: No rashes  Head/Eyes/Ears/Mouth: No headache  Respiratory: No dyspnea, cough  CV: No chest pain  GI: No abdominal pain, diarrhea, constipation, nausea, vomiting  : No increased frequency, dysuria, hematuria, nocturia  MSK: No joint pain, no edema  Neuro: No dizziness/lightheadedness, weakness    All other systems were reviewed and are negative, except as noted.    VITALS/PHYSICAL EXAM  --------------------------------------------------------------------------------  T(C): 36.3 (10-11-23 @ 05:30), Max: 36.6 (10-10-23 @ 21:10)  HR: 65 (10-11-23 @ 05:30) (62 - 67)  BP: 122/59 (10-11-23 @ 05:30) (122/59 - 167/74)  RR: 17 (10-11-23 @ 05:30) (16 - 17)  SpO2: 100% (10-11-23 @ 05:30) (100% - 100%)  Wt(kg): --      Physical Exam:  Gen: NAD, well-appearing  HEENT: PERRL, supple neck, clear oropharynx  Pulm: CTA B/L  CV: RRR, S1S2;  Back: No spinal or CVA tenderness  Abd: +BS, soft, nontender/nondistended  : No suprapubic tenderness  Extremities: no bilateral LE edema noted  Neuro: No focal deficits, intact gait  Skin: Warm, without rashes  Vascular access:    LABS/STUDIES  --------------------------------------------------------------------------------              10.0   6.24  >-----------<  70       [10-11-23 @ 06:10]              31.3     139  |  107  |  80  ----------------------------<  115      [10-11-23 @ 06:10]  4.5   |  16  |  6.27        Ca     8.5     [10-11-23 @ 06:10]      iCa    1.18     [10-11 @ 06:10]      Mg     2.30     [10-11-23 @ 06:10]      Phos  5.5     [10-11-23 @ 06:10]            [10-10-23 @ 07:09]    Creatinine Trend:  SCr 6.27 [10-11 @ 06:10]  SCr 6.17 [10-10 @ 07:09]  SCr 6.13 [10-09 @ 07:50]  SCr 6.12 [10-08 @ 14:21]    Urinalysis - [10-11-23 @ 06:10]      Color  / Appearance  / SG  / pH       Gluc 115 / Ketone   / Bili  / Urobili        Blood  / Protein  / Leuk Est  / Nitrite       RBC  / WBC  / Hyaline  / Gran  / Sq Epi  / Non Sq Epi  / Bacteria     Urine Creatinine 139      [10-09-23 @ 01:44]  Urine Protein 469      [10-09-23 @ 01:44]  Urine Sodium 37      [10-09-23 @ 01:44]    Iron 34, TIBC 197, %sat 17      [10-10-23 @ 07:09]  Ferritin 1161      [10-10-23 @ 07:09]  PTH -- (Ca --)      [10-10-23 @ 07:09]   324  Vitamin D (25OH) 17.4      [10-10-23 @ 07:09]  TSH 2.57      [10-10-23 @ 07:09]    HIV Nonreact      [10-08-23 @ 14:21]    C3 Complement 114      [10-11-23 @ 06:10]  C4 Complement 54      [10-11-23 @ 06:10]

## 2023-10-11 NOTE — CONSULT NOTE ADULT - ATTENDING COMMENTS
small aortic ectasia/aneurysm  no acute intervention warranted    also w alberto, ckd, will discuss w renal re dialysis access
Pt is a 78-year-old male with past medical history of CAD status post 1 stent, hyperlipidemia, diabetes, hypertension, A-fib no longer on anticoagulation reports only aspirin, CVA with no residual deficits, CKD, p/w difficulty walking. Nephrology consulted for increased Cr compared to year prior.  Follows with nephrologist.  No uremic symptoms, no chest pain  1.  CKD5--sono c/w advanced disease although would check renal venous dopplers (nephrotic) and arterial (disparate renal size).  Check with Dr. Dobbs and if incomplete add serologic w/u.  VERY high risk of radiocontrast in event required as part of w/u for NPH, other med conditions.  Would discuss with Dr. Dobbs vascular access w/u to minimize catheter initiation *patient would agree to HD if needed and discussed with CHARLY).    2.  Nephrotic range proteinuria--likely DM related but would check FLC and basic serologic w/u.  Rx of possible treatable etiology unlikely to provide benefit which would exceed risk of empiric rx or renal bx  3.  Secondary hyperparathyroidism--P elevated.  Check PTH, ionized Ca vitamin D  4.  Anemia--hgb at goal.  NO SHIVAM, check Fe w/u.    5.  HYpertension--suboptimal rx.  NO RAASi.  CCB ie nifedipine xl 60, ascertain other meds

## 2023-10-11 NOTE — PROVIDER CONTACT NOTE (MEDICATION) - BACKGROUND
79yo male with pmhx CAD s/p stent, CVA no residual defects, presented to ER complaining of progressively worsening bilateral lower extremity weakness.

## 2023-10-11 NOTE — PROGRESS NOTE ADULT - PROBLEM SELECTOR PLAN 5
S/p stent in 2021 at [facility]  - c/w asa 81 and atorvastatin  - hold gemfibrozil in setting of RADHA
S/p stent in 2021 at [facility]  - c/w asa 81 and atorvastation  - hold gemfibrozil in setting of RADHA
S/p stent in 2021 at [facility]  - c/w asa 81 and atorvastatin  - hold gemfibrozil in setting of RADHA

## 2023-10-11 NOTE — PROGRESS NOTE ADULT - PROBLEM SELECTOR PLAN 4
wbc 2.66. Afebrile, no infectious symptoms.  - continue to monitor.
wbc 2.66. Afebrile, no infectious symptoms.  - continue to monitor.    #Anemia,   - No signs of bleeding on exam. Iron studies consistent with anemia of chronic disease.   - Hgb stable   - Continue to monitor
wbc 2.66. Afebrile, no infectious symptoms.  - continue to monitor.    #Anemia,   - No signs of bleeding on exam. Iron studies consistent with anemia of chronic disease.   - Hgb stable   - Continue to monitor

## 2023-10-11 NOTE — PROGRESS NOTE ADULT - PROBLEM SELECTOR PLAN 6
Pt reports he is on ozempic, farxiga, tresiba. A1C 6.4.   - start low dose ISS

## 2023-10-11 NOTE — PROGRESS NOTE ADULT - PROBLEM SELECTOR PLAN 7
BP elevated. Pt on labetalol, amlodipine, clonidine, and carvedilol. Unclear why pt is on 2 beta-blockers.  - will c/w labetalol 200 mg TID, amlodipine 10 mg QD, and clonidine patch 0.1 mg q week.

## 2023-10-12 ENCOUNTER — TRANSCRIPTION ENCOUNTER (OUTPATIENT)
Age: 78
End: 2023-10-12

## 2023-10-12 ENCOUNTER — NON-APPOINTMENT (OUTPATIENT)
Age: 78
End: 2023-10-12

## 2023-10-12 VITALS — WEIGHT: 184.97 LBS

## 2023-10-12 DIAGNOSIS — E55.9 VITAMIN D DEFICIENCY, UNSPECIFIED: ICD-10-CM

## 2023-10-12 DIAGNOSIS — E87.20 ACIDOSIS, UNSPECIFIED: ICD-10-CM

## 2023-10-12 LAB
ANA TITR SER: NEGATIVE — SIGNIFICANT CHANGE UP
ANION GAP SERPL CALC-SCNC: 17 MMOL/L — HIGH (ref 7–14)
BUN SERPL-MCNC: 86 MG/DL — HIGH (ref 7–23)
CALCIUM SERPL-MCNC: 8.3 MG/DL — LOW (ref 8.4–10.5)
CHLORIDE SERPL-SCNC: 105 MMOL/L — SIGNIFICANT CHANGE UP (ref 98–107)
CO2 SERPL-SCNC: 15 MMOL/L — LOW (ref 22–31)
CREAT SERPL-MCNC: 6.35 MG/DL — HIGH (ref 0.5–1.3)
EGFR: 8 ML/MIN/1.73M2 — LOW
GLUCOSE BLDC GLUCOMTR-MCNC: 113 MG/DL — HIGH (ref 70–99)
GLUCOSE SERPL-MCNC: 105 MG/DL — HIGH (ref 70–99)
HCT VFR BLD CALC: 31.2 % — LOW (ref 39–50)
HGB BLD-MCNC: 10 G/DL — LOW (ref 13–17)
MAGNESIUM SERPL-MCNC: 2.3 MG/DL — SIGNIFICANT CHANGE UP (ref 1.6–2.6)
MCHC RBC-ENTMCNC: 26.2 PG — LOW (ref 27–34)
MCHC RBC-ENTMCNC: 32.1 GM/DL — SIGNIFICANT CHANGE UP (ref 32–36)
MCV RBC AUTO: 81.9 FL — SIGNIFICANT CHANGE UP (ref 80–100)
NRBC # BLD: 0 /100 WBCS — SIGNIFICANT CHANGE UP (ref 0–0)
NRBC # FLD: 0 K/UL — SIGNIFICANT CHANGE UP (ref 0–0)
PHOSPHATE SERPL-MCNC: 5.5 MG/DL — HIGH (ref 2.5–4.5)
PLATELET # BLD AUTO: 79 K/UL — LOW (ref 150–400)
POTASSIUM SERPL-MCNC: 4.1 MMOL/L — SIGNIFICANT CHANGE UP (ref 3.5–5.3)
POTASSIUM SERPL-SCNC: 4.1 MMOL/L — SIGNIFICANT CHANGE UP (ref 3.5–5.3)
RBC # BLD: 3.81 M/UL — LOW (ref 4.2–5.8)
RBC # FLD: 14.6 % — HIGH (ref 10.3–14.5)
SODIUM SERPL-SCNC: 137 MMOL/L — SIGNIFICANT CHANGE UP (ref 135–145)
WBC # BLD: 6.1 K/UL — SIGNIFICANT CHANGE UP (ref 3.8–10.5)
WBC # FLD AUTO: 6.1 K/UL — SIGNIFICANT CHANGE UP (ref 3.8–10.5)

## 2023-10-12 PROCEDURE — 99239 HOSP IP/OBS DSCHRG MGMT >30: CPT

## 2023-10-12 PROCEDURE — 99233 SBSQ HOSP IP/OBS HIGH 50: CPT | Mod: GC

## 2023-10-12 RX ORDER — GABAPENTIN 400 MG/1
1 CAPSULE ORAL
Qty: 0 | Refills: 0 | DISCHARGE

## 2023-10-12 RX ORDER — SODIUM BICARBONATE 1 MEQ/ML
650 SYRINGE (ML) INTRAVENOUS THREE TIMES A DAY
Refills: 0 | Status: DISCONTINUED | OUTPATIENT
Start: 2023-10-12 | End: 2023-10-12

## 2023-10-12 RX ORDER — ATORVASTATIN CALCIUM 80 MG/1
1 TABLET, FILM COATED ORAL
Qty: 0 | Refills: 0 | DISCHARGE
Start: 2023-10-12

## 2023-10-12 RX ORDER — SODIUM BICARBONATE 1 MEQ/ML
1 SYRINGE (ML) INTRAVENOUS
Qty: 90 | Refills: 0
Start: 2023-10-12 | End: 2023-11-10

## 2023-10-12 RX ORDER — CHOLECALCIFEROL (VITAMIN D3) 125 MCG
1000 CAPSULE ORAL
Qty: 30 | Refills: 0
Start: 2023-10-12 | End: 2023-11-10

## 2023-10-12 RX ORDER — NIFEDIPINE 30 MG
1 TABLET, EXTENDED RELEASE 24 HR ORAL
Qty: 0 | Refills: 0 | DISCHARGE
Start: 2023-10-12

## 2023-10-12 RX ORDER — CHOLECALCIFEROL (VITAMIN D3) 125 MCG
1000 CAPSULE ORAL DAILY
Refills: 0 | Status: DISCONTINUED | OUTPATIENT
Start: 2023-10-12 | End: 2023-10-12

## 2023-10-12 RX ORDER — CARVEDILOL PHOSPHATE 80 MG/1
1 CAPSULE, EXTENDED RELEASE ORAL
Qty: 0 | Refills: 0 | DISCHARGE

## 2023-10-12 RX ADMIN — Medication 650 MILLIGRAM(S): at 08:12

## 2023-10-12 RX ADMIN — Medication 1 PATCH: at 08:00

## 2023-10-12 RX ADMIN — Medication 650 MILLIGRAM(S): at 13:26

## 2023-10-12 RX ADMIN — Medication 60 MILLIGRAM(S): at 05:38

## 2023-10-12 RX ADMIN — Medication 200 MILLIGRAM(S): at 05:38

## 2023-10-12 NOTE — PROVIDER CONTACT NOTE (OTHER) - ACTION/TREATMENT ORDERED:
ACP Aware
NONE AT THIS TIME
Provider made aware. No new orders given at this time. Safety maintained.

## 2023-10-12 NOTE — PROVIDER CONTACT NOTE (OTHER) - BACKGROUND
PT EDUCATED ABOUT MEDICATION
79yo male pmhx of CAD s/p stent complaining of worsening BLE weakness, found to have RADHA, admitted for further evaluation and management.
79yo male pmhx of CAD s/p stent complaining of worsening BLE weakness, found to have RADHA, admitted for further evaluation and management.

## 2023-10-12 NOTE — DISCHARGE NOTE PROVIDER - NSDCFUADDAPPT_GEN_ALL_CORE_FT
Please follow up with Dr. Dobbs your outpatient Nephrologist within 1 week. You will need close monitoring of your kidney function as you are likely to new hemodialysis soon. Please have your blood work checked in 1 week.

## 2023-10-12 NOTE — PHARMACOTHERAPY INTERVENTION NOTE - COMMENTS
Discharge medications were reviewed with the patient. Current medication schedule was discussed in detail including: medication name, indication, dose, administration times, side effects and special instructions. All questions and concerns were answered and addressed. Patient verbalized understanding and was provided with educational handout.    Shannan Zuñiga, PharmD  Clinical Pharmacy Specialist  [facility] [address]

## 2023-10-12 NOTE — DISCHARGE NOTE PROVIDER - NSDCMRMEDTOKEN_GEN_ALL_CORE_FT
allopurinol 100 mg oral tablet: 1 tab(s) orally once a day  amLODIPine 10 mg oral tablet: 1 tab(s) orally once a day (at bedtime)  aspirin 81 mg oral tablet, chewable: 1 tab(s) orally once a day  atorvastatin 80 mg oral tablet: 1 tab(s) orally once a day (at bedtime)   cloNIDine 0.1 mg/24 hr transdermal film, extended release: 1 patch transdermally once a week  Coreg 12.5 mg oral tablet: 1 tab(s) orally 2 times a day  Farxiga 5 mg oral tablet: 1 tab(s) orally once a day  gabapentin 400 mg oral capsule: 1 cap(s) orally once a day  gemfibrozil 600 mg oral tablet: 1 tab(s) orally 2 times a day  labetalol 200 mg oral tablet: 1 tab(s) orally 3 times a day  Ozempic 2 mg/1.5 mL (0.25 mg or 0.5 mg dose) subcutaneous solution:   Tresiba FlexTouch 100 units/mL subcutaneous solution: 80 unit(s) subcutaneous once a day (at bedtime)   allopurinol 100 mg oral tablet: 1 tab(s) orally once a day  aspirin 81 mg oral tablet, chewable: 1 tab(s) orally once a day  atorvastatin 80 mg oral tablet: 1 tab(s) orally once a day (at bedtime)  cholecalciferol oral tablet: 1,000 unit(s) orally once a day 1000 unit(s) orally once a day  cloNIDine 0.1 mg/24 hr transdermal film, extended release: 1 patch transdermally once a week  Farxiga 5 mg oral tablet: 1 tab(s) orally once a day  gemfibrozil 600 mg oral tablet: 1 tab(s) orally 2 times a day  labetalol 200 mg oral tablet: 1 tab(s) orally 3 times a day  NIFEdipine 60 mg oral tablet, extended release: 1 tab(s) orally once a day  Ozempic 2 mg/1.5 mL (0.25 mg or 0.5 mg dose) subcutaneous solution:   sodium bicarbonate 650 mg oral tablet: 1 tab(s) orally 3 times a day  Tresiba FlexTouch 100 units/mL subcutaneous solution: 80 unit(s) subcutaneous once a day (at bedtime)

## 2023-10-12 NOTE — PROGRESS NOTE ADULT - PROBLEM SELECTOR PROBLEM 1
Lower extremity weakness
Acute kidney injury superimposed on CKD
Lower extremity weakness
Lower extremity weakness

## 2023-10-12 NOTE — DISCHARGE NOTE PROVIDER - HOSPITAL COURSE
78-year-old male with past medical history of CAD status post 1 stent, hyperlipidemia, diabetes, hypertension, A-fib no longer on anticoagulation reports only aspirin, CVA with no residual deficits, CKD who presents for feeling unsteady on his feet. Collateral obtained from daughter who states he went to [place] and was walking at his baseline (without cane or walker). He came down with a viral/flu like illness. When he came home he still seemed to be walking okay but then for about 6 days prior to admission he started to experience an unsteady gait. The daughter said he had difficulty walking and required more assistance. On admission, VS. Hypertensive - SBP 180s. Afebrile. On exam, strength was 5/5 in all extremities. Labs significant for elevated BUN/Cr. - double from 1 year prior. BUN 74 and Cr. 6. CTH performed showing enlarged ventricles consistent with NPH - stable from prior CT scan. For unsteady gait, CT scan was reviewed with neurology (not officially consulted) and they recommended outpatient follow up. His strength remained 5/5 and his gait improved. PT initially recommending SAMY but patient was then able to walk down and around the hallway without assistance and the decision was made to continue with outpatient physical therapy. 78-year-old male with past medical history of CAD status post 1 stent, hyperlipidemia, diabetes, hypertension, A-fib no longer on anticoagulation reports only aspirin, CVA with no residual deficits, CKD who presents for feeling unsteady on his feet. Collateral obtained from daughter who states he went to [place] and was walking at his baseline (without cane or walker). He came down with a viral/flu like illness. When he came home he still seemed to be walking okay but then for about 6 days prior to admission he started to experience an unsteady gait. The daughter said he had difficulty walking and required more assistance. On admission, VS. Hypertensive - SBP 180s. Afebrile. On exam, strength was 5/5 in all extremities. Labs significant for elevated BUN/Cr. - double from 1 year prior. BUN 74 and Cr. 6. CTH performed showing enlarged ventricles consistent with NPH - stable from prior CT scan. For unsteady gait, CT scan was reviewed with neurology (not officially consulted) and they recommended outpatient follow up. His strength remained 5/5 and his gait improved. PT initially recommending SAMY but patient was then able to walk down and around the hallway without assistance and the decision was made to continue with outpatient physical therapy. For worsening renal function, renal was consulted. He follows with outpatient nephrology. BUN and Cr. continued to uptrend during the admission. No urgent need for dialysis as per renal. Will eventually require dialysis. Currently, not exhibiting uremic symptoms. Tried to reach out to nephrologist without success. Pt. reports he will try to go to his nephrologist the day after discharge. Pt. felt his gait improved and he felt better. Plan for discharge to home with outpatient follow up with neurology and nephrology. 78-year-old male with past medical history of CAD status post 1 stent, hyperlipidemia, diabetes, hypertension, A-fib no longer on anticoagulation reports only aspirin, CVA with no residual deficits, CKD who presents for feeling unsteady on his feet. Collateral obtained from daughter who states he went to [place] and was walking at his baseline (without cane or walker). He came down with a viral/flu like illness. When he came home he still seemed to be walking okay but then for about 6 days prior to admission he started to experience an unsteady gait. The daughter said he had difficulty walking and required more assistance. On admission, VS. Hypertensive - SBP 180s. Afebrile. On exam, strength was 5/5 in all extremities. Labs significant for elevated BUN/Cr. - double from 1 year prior. BUN 74 and Cr. 6. CTH performed showing enlarged ventricles consistent with NPH - stable from prior CT scan. For unsteady gait, CT scan was reviewed with neurology (not officially consulted) and they recommended outpatient follow up. His strength remained 5/5 and his gait improved. PT initially recommending SAMY but patient was then able to walk down and around the hallway without assistance and the decision was made to continue with outpatient physical therapy. For worsening renal function, renal was consulted. He follows with outpatient nephrology. BUN and Cr. continued to uptrend during the admission. No urgent need for dialysis as per renal. Will eventually require dialysis. Currently, not exhibiting uremic symptoms. Tried to reach out to nephrologist without success. Pt. reports he will try to go to his nephrologist the day after discharge. He was started on sodium bicarb tabs 650 TID and vitamind D. Pt. felt his gait improved and he felt better. Plan for discharge to home with outpatient follow up with neurology and nephrology.

## 2023-10-12 NOTE — DISCHARGE NOTE NURSING/CASE MANAGEMENT/SOCIAL WORK - PATIENT PORTAL LINK FT
You can access the FollowMyHealth Patient Portal offered by [facility] by registering at the following website: [email or web address]. By joining [facility]’s FollowMyHealth portal, you will also be able to view your health information using other applications (apps) compatible with our system.

## 2023-10-12 NOTE — PROGRESS NOTE ADULT - PROBLEM SELECTOR PROBLEM 2
Metabolic acidosis
Acute kidney injury superimposed on CKD

## 2023-10-12 NOTE — DIETITIAN INITIAL EVALUATION ADULT - NS FNS DIET ORDER
Diet, DASH/TLC:   Sodium & Cholesterol Restricted  Consistent Carbohydrate {Evening Snack} (CSTCHOSN)  High Fiber (HIFIBER)  For patients receiving Renal Replacement - No Protein Restr, No Conc K, No Conc Phos, Low Sodium (RENAL) (10-10-23 @ 07:37)

## 2023-10-12 NOTE — DIETITIAN INITIAL EVALUATION ADULT - OTHER INFO
78-year-old male with past medical history of CAD status post 1 stent, hyperlipidemia, diabetes, hypertension, A-fib no longer on anticoagulation reports only aspirin, CVA with no residual deficits, CKD,  presents to the ER complaining of progressively worsening bilateral lower extremity weakness.     Pt seen and reports 25% intake of meals with No GI distress (nausea/vomiting/diarrhea/constipation.) at present. Current wt- 185# (10/12/23). UBW- 189.2# (9/26/22). Noted stable weights in past one year. Rec supplement with Nepro 2x daily (850 kcals, 38.2g protein). Labs reviewed. A1c- 6.4% (10/9/23). Noted skin ecchymosis, no edema per nursing flow sheet.

## 2023-10-12 NOTE — PROGRESS NOTE ADULT - TIME BILLING
Review of laboratory data, radiology results, consultants' recommendations, documentation in [place], discussion with patient/advanced care providers and interdisciplinary staff (such as , social workers, etc). Interventions were performed as documented above.
Coordinating renal care, discussing overall plan including d/c with patient.  Reviewed with Dr. Dobbs staff
Review of laboratory data, radiology results, consultants' recommendations, documentation in [place], discussion with patient/advanced care providers and interdisciplinary staff (such as , social workers, etc). Interventions were performed as documented above.
Review of laboratory data, radiology results, consultants' recommendations, documentation in [place], discussion with patient/advanced care providers and interdisciplinary staff (such as , social workers, etc). Interventions were performed as documented above.

## 2023-10-12 NOTE — DIETITIAN INITIAL EVALUATION ADULT - PROBLEM SELECTOR PLAN 2
Cr elevated to 6.17, increased from 3.7 from 9/2022. Possible pre-renal in setting of poor PO intake. U/A w/ protein  - f/u urine lytes, urine pr/cr ratio  - f/u renal U/S  - will start trial IVF  - avoid nephrotoxic agents  - hold farxiga for now  - renal consult in  AM

## 2023-10-12 NOTE — DISCHARGE NOTE PROVIDER - CARE PROVIDER_API CALL
French Lange  Neurology  [address], [address]  [place] [address]  Phone: [phone]  Fax: [phone]  Follow Up Time:    French Lange  Neurology  [address]  [place] [address]  Phone: [phone]  Fax: [phone]  Follow Up Time:     Ray Dobbs  Nephrology  [address] [place] [address]  Phone: [phone]  Fax: [phone]  Follow Up Time: 1 week

## 2023-10-12 NOTE — PROVIDER CONTACT NOTE (OTHER) - ASSESSMENT
/85, HR75, 98.6, 100% on RA
Despite the education and encouragement patient refused heparin injection. Multiple attempts made. Still refused.

## 2023-10-12 NOTE — PROGRESS NOTE ADULT - PROBLEM SELECTOR PLAN 2
Pt with metabolic acidosis in the setting of advanced renal failure. SCO2 low at 15 today. Recommend starting on bicarb tabs 650mg TID. Monitor SCO2 and serum potassium levels.

## 2023-10-12 NOTE — DISCHARGE NOTE PROVIDER - NSDCCPCAREPLAN_GEN_ALL_CORE_FT
PRINCIPAL DISCHARGE DIAGNOSIS  Diagnosis: Unsteady gait  Assessment and Plan of Treatment: You were complaining of feeling wobbly on your feet. In medical terms we call this an unsteady gait. We did a CT scan of your head which showed enlarged ventricles. Enlarged ventricles can sometimes make you feel unsteady on your feet. Your strength remained intact. The enlarged ventricles were stable in size from a prior CT scan. The recommendation is to have this evaluated with neurology in the office with a specialist. The phone number for the specialist is: Dr. Lange [phone]      SECONDARY DISCHARGE DIAGNOSES  Diagnosis: Acute kidney injury superimposed on CKD  Assessment and Plan of Treatment: You have a history of chronic kidney disease. There has been progression in the disease and your kidneys have become slower. Your BUN and Cr. are very elevated. BUN 80s and Cr. 6's. The renal doctors evaluated you in the hospital and recommended no further inpatient treatment and no need for urgent dialysis however they informed you that you will likely need dialysis soon. We tried to reach out to your nephrologist but we could not get in touch with him. Please bring this paperwork with you to the nephrologist.     PRINCIPAL DISCHARGE DIAGNOSIS  Diagnosis: Unsteady gait  Assessment and Plan of Treatment: You were complaining of feeling wobbly on your feet. In medical terms we call this an unsteady gait. We did a CT scan of your head which showed enlarged ventricles. Enlarged ventricles can sometimes make you feel unsteady on your feet. Your strength remained intact. The enlarged ventricles were stable in size from a prior CT scan. The recommendation is to have this evaluated with neurology in the office with a specialist. The phone number for the specialist is: Dr. Lange [phone]      SECONDARY DISCHARGE DIAGNOSES  Diagnosis: Acute kidney injury superimposed on CKD  Assessment and Plan of Treatment: You have a history of chronic kidney disease. There has been progression in the disease and your kidneys have become slower. Your BUN and Cr. are very elevated. BUN 80s and Cr. 6's. The renal doctors evaluated you in the hospital and recommended no further inpatient treatment and no need for urgent dialysis however they informed you that you will likely need dialysis soon. We started you on sodium bicarbonate tablets 650mg three times a day. We also started you on vitamin D supplement - 1000 micrograms once a day. We tried to reach out to your nephrologist but we could not get in touch with him. Please bring this paperwork with you to the nephrologist.

## 2023-10-12 NOTE — DISCHARGE NOTE PROVIDER - CARE PROVIDERS DIRECT ADDRESSES
,[email or web address]_Unknown

## 2023-10-12 NOTE — DIETITIAN INITIAL EVALUATION ADULT - PROBLEM SELECTOR PLAN 5
S/p stent in 2021 at [facility]  - c/w asa 81 and atorvastation  - hold gemfibrozil in setting of RADHA

## 2023-10-12 NOTE — DIETITIAN INITIAL EVALUATION ADULT - ORAL INTAKE PTA/DIET HISTORY
Pt reports poor appetite for a past 2 weeks. UBW- 189.2# (9/26/22) per HIE. Noted stable weights in past one year.

## 2023-10-12 NOTE — DISCHARGE NOTE PROVIDER - PROVIDER TOKENS
PROVIDER:[TOKEN:[[id number]]] PROVIDER:[TOKEN:[[id number]]],PROVIDER:[TOKEN:[[id number]],FOLLOWUP:[1 week]]

## 2023-10-12 NOTE — PROGRESS NOTE ADULT - ATTENDING COMMENTS
Pt is a 78-year-old male with past medical history of CAD status post 1 stent, hyperlipidemia, diabetes, hypertension, A-fib no longer on anticoagulation reports only aspirin, CVA with no residual deficits, CKD, p/w difficulty walking. Nephrology consulted for increased Cr compared to year prior.  Follows with nephrologist Ray Dobbs.  No uremic symptoms, no chest pain.    1.  CKD5--sono c/w advanced disease although would check renal venous dopplers (nephrotic) and arterial (disparate renal size).  Check with Dr. Dobbs and if incomplete add serologic w/u.  We left message to call if wants to schedule access placement.  VERY high risk of radiocontrast in event required as part of w/u for NPH, other med conditions. Patient would agree to HD if needed and HE discussed with CHARLY).    2.  Nephrotic range proteinuria--likely DM related but would check FLC and basic serologic w/u.  Rx of possible treatable etiology unlikely to provide benefit which would exceed risk of empiric rx or renal bx  3.  Secondary hyperparathyroidism--P elevated.  Check PTH, ionized Ca vitamin D  4.  Anemia--hgb at goal.  NO SHIVAM, check Fe w/u.    5.  HYpertension--suboptimal rx.  NO RAASi.  CCB ie nifedipine xl 60, titrate, alpha blocker
Pt is a 78-year-old male with past medical history of CAD status post 1 stent, hyperlipidemia, diabetes, hypertension, A-fib no longer on anticoagulation reports only aspirin, CVA with no residual deficits, CKD, p/w difficulty walking. Nephrology consulted for increased Cr compared to year prior.  Follows with nephrologist Ray Dobbs.  No uremic symptoms, no chest pain.    1.  CKD5--sono c/w advanced disease although would check renal venous dopplers (nephrotic) and arterial (disparate renal size...ASCVD)).  Check with Dr. Dobbs and if incomplete add serologic w/u.  We left message to call if wants to schedule access placement.  VERY high risk of radiocontrast in event required as part of w/u for NPH, other med conditions. Patient would agree to HD if needed and HE discussed with CHARLY).  Needs vascular access evaluation which could be part of current vascular surgery input.  NO IMMEDIATE HD warranted  2.  Nephrotic range proteinuria--likely DM related but would check FLC(-) and basic serologic w/u - thus far.  Rx of possible treatable etiology unlikely to provide benefit which would exceed risk of empiric rx or renal bx  3.  Secondary hyperparathyroidism--P elevated.  Check PTH (elevated<500), ionized Ca (OK) vitamin D.  50K ergocalciferol q week X 6.  Likely require phoslo when P>6.    4.  Anemia--hgb at goal.  NO SHIVAM, Fe w/u borderline, trend .    5.  HYpertension--suboptimal rx.  NO RAASi.  CCB ie nifedipine xl 60, titrate, alpha blocker vs labetolol    discussed with med team  Jose Armando Vazquez MD  contact me on TEAMS
Pt is a 78-year-old male with past medical history of CAD status post 1 stent, hyperlipidemia, diabetes, hypertension, A-fib no longer on anticoagulation reports only aspirin, CVA with no residual deficits, CKD, p/w difficulty walking. Nephrology consulted for increased Cr compared to year prior.  Follows with nephrologist Ray Dobbs.  No uremic symptoms, no chest pain.  Discussed case in detail with patient who understands necessity of clowe f/u and access surgery in near future  1.  CKD5--sono c/w advanced disease although would check renal venous dopplers (nephrotic) and arterial (disparate renal size...ASCVD with infrarenal aortic aneurysm).  Serologic w/u unrevealing.  We left message to call if wants to schedule access placement.  VERY high risk of radiocontrast in event required as part of w/u for NPH, other med conditions. Patient would agree to HD if needed and HE previously discussed with CHARLY). NO IMMEDIATE HD warranted  2.  Nephrotic range proteinuria--likely DM related but would check FLC(-) and basic serologic w/u - thus far.  Rx of possible treatable etiology unlikely to provide benefit which would exceed risk of empiric rx or renal bx  3.  Secondary hyperparathyroidism--P elevated.  Checked PTH (elevated<500), ionized Ca (OK) vitamin D.  50K ergocalciferol q week X 6.  Likely require phoslo when P>6.    4.  Anemia--hgb at goal.  NO SHIVAM, Fe w/u borderline, trend .    5.  HYpertension-improved l rx.  NO RAASi.  CCB ie nifedipine xl 60, titrate, alpha blocker + labetolol  6.  ASCVD--AAA seen by vascular.  NO surgical intervention    discussed with med team, message left with PMD neph  Jose Armando Vazquez MD  contact me on TEAMS

## 2023-10-12 NOTE — DIETITIAN INITIAL EVALUATION ADULT - PERTINENT LABORATORY DATA
10-12    137  |  105  |  86<H>  ----------------------------<  105<H>  4.1   |  15<L>  |  6.35<H>    Ca    8.3<L>      12 Oct 2023 05:49  Phos  5.5     10-12  Mg     2.30     10-12    POCT Blood Glucose.: 123 mg/dL (10-12-23 @ 12:53)  A1C with Estimated Average Glucose Result: 6.4 % (10-09-23 @ 07:50)

## 2023-10-12 NOTE — PROGRESS NOTE ADULT - SUBJECTIVE AND OBJECTIVE BOX
[facility] Division of Kidney Diseases & Hypertension  FOLLOW UP NOTE  [phone]--------------------------------------------------------------------------------    Chief Complaint: RADHA on CKD    24 hour events/subjective: Pt seen and examined at the bedside. Feels wells and offers no complaints today. Pt wants to go home. Denies HA, fevers/chills, CP, SOB, abd pain, urinary symptoms, or leg swelling.    PAST HISTORY  --------------------------------------------------------------------------------  No significant changes to PMH, PSH, FHx, SHx, unless otherwise noted    ALLERGIES & MEDICATIONS  --------------------------------------------------------------------------------  Allergies  No Known Allergies    Intolerances    Standing Inpatient Medications  allopurinol 100 milliGRAM(s) Oral daily  aspirin  chewable 81 milliGRAM(s) Oral daily  atorvastatin 80 milliGRAM(s) Oral at bedtime  chlorhexidine 2% Cloths 1 Application(s) Topical daily  cloNIDine Patch 0.1 mG/24Hr(s) 1 patch Transdermal every 7 days  dextrose 5%. 1000 milliLiter(s) IV Continuous <Continuous>  dextrose 5%. 1000 milliLiter(s) IV Continuous <Continuous>  dextrose 50% Injectable 25 Gram(s) IV Push once  dextrose 50% Injectable 12.5 Gram(s) IV Push once  dextrose 50% Injectable 25 Gram(s) IV Push once  glucagon  Injectable 1 milliGRAM(s) IntraMuscular once  heparin   Injectable 5000 Unit(s) SubCutaneous every 8 hours  influenza  Vaccine (HIGH DOSE) 0.7 milliLiter(s) IntraMuscular once  labetalol 200 milliGRAM(s) Oral three times a day  NIFEdipine XL 60 milliGRAM(s) Oral daily  sodium bicarbonate 650 milliGRAM(s) Oral three times a day    PRN Inpatient Medications  acetaminophen     Tablet .. 650 milliGRAM(s) Oral every 6 hours PRN  dextrose Oral Gel 15 Gram(s) Oral once PRN    REVIEW OF SYSTEMS  --------------------------------------------------------------------------------  Gen: No fevers/chills  Head/Eyes/Ears/Mouth: No headache  Respiratory: No dyspnea, cough  CV: No chest pain  GI: No abdominal pain, diarrhea  MSK: No joint pain, no edema  Neuro: No dizziness/lightheadedness, weakness    All other systems were reviewed and are negative, except as noted.    VITALS/PHYSICAL EXAM  --------------------------------------------------------------------------------  T(C): 36.4 (10-12-23 @ 05:36), Max: 36.7 (10-11-23 @ 17:59)  HR: 67 (10-12-23 @ 05:36) (62 - 67)  BP: 139/68 (10-12-23 @ 05:36) (116/50 - 154/82)  RR: 18 (10-12-23 @ 05:36) (18 - 18)  SpO2: 100% (10-12-23 @ 05:36) (100% - 100%)  Wt(kg): --    Physical Exam:  Gen: NAD, well-appearing  HEENT: MMM  Pulm: CTA B/L  CV: RRR, S1S2  Abd: +BS, soft, nontender/nondistended  Extremities: no bilateral LE edema noted  Neuro: awake  Skin: Warm, without rashes    LABS/STUDIES  --------------------------------------------------------------------------------              10.0   6.10  >-----------<  79       [10-12-23 @ 05:49]              31.2     137  |  105  |  86  ----------------------------<  105      [10-12-23 @ 05:49]  4.1   |  15  |  6.35        Ca     8.3     [10-12-23 @ 05:49]      iCa    1.18     [10-11 @ 06:10]      Mg     2.30     [10-12-23 @ 05:49]      Phos  5.5     [10-12-23 @ 05:49]    Creatinine Trend:  SCr 6.35 [10-12 @ 05:49]  SCr 6.27 [10-11 @ 06:10]  SCr 6.17 [10-10 @ 07:09]  SCr 6.13 [10-09 @ 07:50]  SCr 6.12 [10-08 @ 14:21]    Urine Creatinine 139      [10-09-23 @ 01:44]  Urine Protein 469      [10-09-23 @ 01:44]  Urine Sodium 37      [10-09-23 @ 01:44]    Iron 34, TIBC 197, %sat 17      [10-10-23 @ 07:09]  Ferritin 1161      [10-10-23 @ 07:09]  PTH -- (Ca --)      [10-10-23 @ 07:09]   324  Vitamin D (25OH) 17.4      [10-10-23 @ 07:09]  TSH 2.57      [10-10-23 @ 07:09]    HBsAg Nonreact      [10-11-23 @ 06:10]  HCV 0.10, Nonreact      [10-11-23 @ 06:10]  HIV Nonreact      [10-08-23 @ 14:21]    AJ: titer Negative, pattern --      [10-11-23 @ 06:10]  C3 Complement 114      [10-11-23 @ 06:10]  C4 Complement 54      [10-11-23 @ 06:10]  Free Light Chains: kappa 36.85, lambda 25.49, ratio = 1.45      [10-11 @ 06:10]

## 2023-10-12 NOTE — DISCHARGE NOTE NURSING/CASE MANAGEMENT/SOCIAL WORK - NSDCPEFALRISK_GEN_ALL_CORE
For information on Fall & Injury Prevention, visit: [email or web address] OR  [email or web address] OR  https://www.cdc.gov/steadi/patient.html

## 2023-10-12 NOTE — DIETITIAN INITIAL EVALUATION ADULT - PROBLEM SELECTOR PLAN 1
Pt reports b/l LE weakness x6 days causing unsteady gait. No red flag symptoms, strength 5/5 throughout w/o any focal findings on neuro exam. Pt reports symptoms are improving. CTH with stable findings, possible evidence of NPH as seen on previous CTH from 9/2022. Pt w/o other symptoms of urinary incontinence or change in mental status. Unclear etiology, nph vs gbs (recent flu like illness) vs myopathy vs less stroke or infectious   - f/u CK, Mg, phos, TSH  - can consider neuro eval if not improving

## 2023-10-12 NOTE — DIETITIAN INITIAL EVALUATION ADULT - PROBLEM SELECTOR PLAN 3
Trop elevated, suspect likely in setting of RADHA on CKD. Pt denies CP or SOB. EKG unchanged from prior. Low suspicion for ACS.   - continue to monitor

## 2023-10-12 NOTE — DISCHARGE NOTE PROVIDER - ATTENDING DISCHARGE PHYSICAL EXAMINATION:
.  VITAL SIGNS:  T(C): 36.4 (10-12-23 @ 05:36), Max: 36.7 (10-11-23 @ 17:59)  T(F): 97.5 (10-12-23 @ 05:36), Max: 98 (10-11-23 @ 17:59)  HR: 67 (10-12-23 @ 05:36) (62 - 67)  BP: 139/68 (10-12-23 @ 05:36) (133/66 - 154/82)  BP(mean): --  RR: 18 (10-12-23 @ 05:36) (18 - 18)  SpO2: 100% (10-12-23 @ 05:36) (100% - 100%)  Wt(kg): --    PHYSICAL EXAM:    Constitutional: Comfortable and resting in bed  Head: NC/AT  Eyes: PERRL, EOMI, anicteric sclera  ENT: no nasal discharge; uvula midline, no oropharyngeal erythema or exudates; MMM  Neck: supple; no JVD or thyromegaly  Respiratory: CTA B/L; no W/R/R, no retractions  Cardiac: +S1/S2; RRR; no M/R/G; PMI non-displaced  Gastrointestinal: abdomen soft, NT/ND; no rebound or guarding; +BSx4  Back: spine midline, no bony tenderness or step-offs; no CVAT B/L  Extremities: WWP, no clubbing or cyanosis; no peripheral edema  Musculoskeletal: NROM x4; no joint swelling, tenderness or erythema  Vascular: 2+ radial, femoral, DP/PT pulses B/L  Dermatologic: skin warm, dry and intact; no rashes, wounds, or scars  Lymphatic: no submandibular or cervical LAD  Neurologic: AAOx3; CNII-XII grossly intact; no focal deficits  Psychiatric: affect and characteristics of appearance, verbalizations, behaviors are appropriate

## 2023-10-12 NOTE — DIETITIAN INITIAL EVALUATION ADULT - PERTINENT MEDS FT
MEDICATIONS  (STANDING):  allopurinol 100 milliGRAM(s) Oral daily  aspirin  chewable 81 milliGRAM(s) Oral daily  atorvastatin 80 milliGRAM(s) Oral at bedtime  chlorhexidine 2% Cloths 1 Application(s) Topical daily  cloNIDine Patch 0.1 mG/24Hr(s) 1 patch Transdermal every 7 days  dextrose 5%. 1000 milliLiter(s) (100 mL/Hr) IV Continuous <Continuous>  dextrose 5%. 1000 milliLiter(s) (50 mL/Hr) IV Continuous <Continuous>  dextrose 50% Injectable 25 Gram(s) IV Push once  dextrose 50% Injectable 12.5 Gram(s) IV Push once  dextrose 50% Injectable 25 Gram(s) IV Push once  glucagon  Injectable 1 milliGRAM(s) IntraMuscular once  heparin   Injectable 5000 Unit(s) SubCutaneous every 8 hours  influenza  Vaccine (HIGH DOSE) 0.7 milliLiter(s) IntraMuscular once  labetalol 200 milliGRAM(s) Oral three times a day  NIFEdipine XL 60 milliGRAM(s) Oral daily  sodium bicarbonate 650 milliGRAM(s) Oral three times a day    MEDICATIONS  (PRN):  acetaminophen     Tablet .. 650 milliGRAM(s) Oral every 6 hours PRN Temp greater or equal to 38C (100.4F), Mild Pain (1 - 3)  dextrose Oral Gel 15 Gram(s) Oral once PRN Blood Glucose LESS THAN 70 milliGRAM(s)/deciliter

## 2023-10-12 NOTE — PROGRESS NOTE ADULT - PROBLEM SELECTOR PLAN 3
Vitamin D 1,25 levels low at 16.9. Recommend Vitamin D supplementation. This will aid in calcium reabsorption and decrease of intact PTH levels which are currently elevated at 324.     If you have any questions, please feel free to contact me.  Antonio Woo  Nephrology Fellow  [id number] / [employer] Teams (Preferred)  (After 4pm or on weekends, please call the on-call Fellow)
Trop elevated, suspect likely in setting of RADHA on CKD. Pt denies CP or SOB. EKG unchanged from prior. Low suspicion for ACS. Trop downtrended.  - continue to monitor  - No need to trend trop

## 2023-10-12 NOTE — PROGRESS NOTE ADULT - PROBLEM SELECTOR PLAN 1
Pt reports b/l LE weakness x6 days causing unsteady gait. Pt reports diffuse weakness in all extremities upon further questioning. Pt. is strong on exam - strength is 5/5 in upper and lower extremities. CTH with stable findings, possible evidence of NPH as seen on previous CTH from 9/2022. Pt w/o other symptoms of urinary incontinence or change in mental status. Diffuse weakness may be 2/2 worsening kidney function in the setting of elevated BUN vs hypocalcemia (ionized calcium is borderline low - PTH Is high - vit D is low) vs deconditioning (age). NPH remains on the differential.   - VIt D 125 pending  - W/u for NPH is outpatient - would need to follow up with Dr. White [phone]  - Will hold off on neuro consult at this time.   - PT recommending SAMY
Pt with progression of advanced kidney failure. Outpatient labs from Nephrologist's office (Dr. Ray Dobbs) with Scr elevated at 5.78 (eGFR 9). Scr on admission was elevated at 6.13 and remains elevated at 6.35 today. C3 and C4 not low. Kappa/Lambda ratio is WNL. Hepatitis B and C are negative. UPCR elevated at 3.4gm. Kidney US on 10/9/23 with no evidence of hydronephrosis. Renal artery US on 10/11/23 showed focal, partially thrombosed, fusiform aneurysmal dilatation visualized at the infrarenal segment of the abdominal aorta. Vascular surgery note reviewed and no acute surgical intervention is planned. Pt will need close follow up with his outpatient Nephrologist for discussion and preparation for HD. Monitor labs and urine output. Avoid nephrotoxins. Dose medications as per eGFR.
Pt reports b/l LE weakness x6 days causing unsteady gait. Pt reports diffuse weakness in all extremities upon further questioning. Pt. is strong on exam - strength is 5/5 in upper and lower extremities. CTH with stable findings, possible evidence of NPH as seen on previous CTH from 9/2022. Pt w/o other symptoms of urinary incontinence or change in mental status. Diffuse weakness may be 2/2 worsening kidney function in the setting of elevated BUN vs hypocalcemia (ionized calcium is borderline low - PTH Is high - vit D is low) vs deconditioning (age). NPH remains on the differential.   - W/u for NPH is outpatient - would need to follow up with Dr. White [phone]  - Will hold off on neuro consult at this time.   - PT recommending SAMY
Pt reports b/l LE weakness x6 days causing unsteady gait. No red flag symptoms, strength 5/5 throughout w/o any focal findings on neuro exam. Pt reports symptoms are improving. CTH with stable findings, possible evidence of NPH as seen on previous CTH from 9/2022. Pt w/o other symptoms of urinary incontinence or change in mental status. Unclear etiology, nph vs gbs (recent flu like illness) vs myopathy vs less stroke or infectious   - f/u CK, Mg, phos, TSH  - W/u for NPH is outpatient - would need to follow up with Dr. White [phone]  - PT consulted

## 2023-11-05 NOTE — PATIENT PROFILE ADULT - FUNCTIONAL ASSESSMENT - DAILY ACTIVITY 2.
[address]  [facility] [id number]  [phone]          November 8, 2023                    To Whom It Concern:    This is to certify Mr. Vu was at the hospital due to the illness of his father Yuriy Vu was admitted at [facility] on 11/5/2023 to 11/09/2023. Please excuse him from work during these period.  Thank you.    Sincerely,          _______________________________     Sydnie Dupree MD      [facility]     4 = No assist / stand by assistance

## 2024-02-19 NOTE — PHYSICAL THERAPY INITIAL EVALUATION ADULT - PHYSICAL ASSIST/NONPHYSICAL ASSIST: SUPINE/SIT, REHAB EVAL
Preventive Care Advice   This is general advice given by our system to help you stay healthy. However, your care team may have specific advice just for you. Please talk to your care team about your preventive care needs.  Nutrition  Eat 5 or more servings of fruits and vegetables each day.  Try wheat bread, brown rice and whole grain pasta (instead of white bread, rice, and pasta).  Get enough calcium and vitamin D. Check the label on foods and aim for 100% of the RDA (recommended daily allowance).  Lifestyle  Exercise at least 150 minutes each week  (30 minutes a day, 5 days a week).  Do muscle strengthening activities 2 days a week. These help control your weight and prevent disease.  No smoking.  Wear sunscreen to prevent skin cancer.  Have a dental exam and cleaning every 6 months.  Yearly exams  See your health care team every year to talk about:  Any changes in your health.  Any medicines your care team has prescribed.  Preventive care, family planning, and ways to prevent chronic diseases.  Shots (vaccines)   HPV shots (up to age 26), if you've never had them before.  Hepatitis B shots (up to age 59), if you've never had them before.  COVID-19 shot: Get this shot when it's due.  Flu shot: Get a flu shot every year.  Tetanus shot: Get a tetanus shot every 10 years.  Pneumococcal, hepatitis A, and RSV shots: Ask your care team if you need these based on your risk.  Shingles shot (for age 50 and up)  General health tests  Diabetes screening:  Starting at age 35, Get screened for diabetes at least every 3 years.  If you are younger than age 35, ask your care team if you should be screened for diabetes.  Cholesterol test: At age 39, start having a cholesterol test every 5 years, or more often if advised.  Bone density scan (DEXA): At age 50, ask your care team if you should have this scan for osteoporosis (brittle bones).  Hepatitis C: Get tested at least once in your life.  STIs (sexually transmitted  infections)  Before age 24: Ask your care team if you should be screened for STIs.  After age 24: Get screened for STIs if you're at risk. You are at risk for STIs (including HIV) if:  You are sexually active with more than one person.  You don't use condoms every time.  You or a partner was diagnosed with a sexually transmitted infection.  If you are at risk for HIV, ask about PrEP medicine to prevent HIV.  Get tested for HIV at least once in your life, whether you are at risk for HIV or not.  Cancer screening tests  Cervical cancer screening: If you have a cervix, begin getting regular cervical cancer screening tests starting at age 21.  Breast cancer scan (mammogram): If you've ever had breasts, begin having regular mammograms starting at age 40. This is a scan to check for breast cancer.  Colon cancer screening: It is important to start screening for colon cancer at age 45.  Have a colonoscopy test every 10 years (or more often if you're at risk) Or, ask your provider about stool tests like a FIT test every year or Cologuard test every 3 years.  To learn more about your testing options, visit:   https://www.[employer]/450886.pdf.  For help making a decision, visit:   https://bit.ly/nh30290.  Prostate cancer screening test: If you have a prostate, ask your care team if a prostate cancer screening test (PSA) at age 55 is right for you.  Lung cancer screening: If you are a current or former smoker ages 50 to 80, ask your care team if ongoing lung cancer screenings are right for you.  For informational purposes only. Not to replace the advice of your health care provider. Copyright   2023 [facility]. All rights reserved. Clinically reviewed by the [facility]. [employer] [id number] - REV 01/24.    Learning About Stress  What is stress?     Stress is your body's response to a hard situation. Your body can have a physical, emotional, or mental response. Stress is a fact of life for  most people, and it affects everyone differently. What causes stress for you may not be stressful for someone else.  A lot of things can cause stress. You may feel stress when you go on a job interview, take a test, or run a race. This kind of short-term stress is normal and even useful. It can help you if you need to work hard or react quickly. For example, stress can help you finish an important job on time.  Long-term stress is caused by ongoing stressful situations or events. Examples of long-term stress include long-term health problems, ongoing problems at work, or conflicts in your family. Long-term stress can harm your health.  How does stress affect your health?  When you are stressed, your body responds as though you are in danger. It makes hormones that speed up your heart, make you breathe faster, and give you a burst of energy. This is called the fight-or-flight stress response. If the stress is over quickly, your body goes back to normal and no harm is done.  But if stress happens too often or lasts too long, it can have bad effects. Long-term stress can make you more likely to get sick, and it can make symptoms of some diseases worse. If you tense up when you are stressed, you may develop neck, shoulder, or low back pain. Stress is linked to high blood pressure and heart disease.  Stress also harms your emotional health. It can make you bah, tense, or depressed. Your relationships may suffer, and you may not do well at work or school.  What can you do to manage stress?  You can try these things to help manage stress:   Do something active. Exercise or activity can help reduce stress. Walking is a great way to get started. Even everyday activities such as housecleaning or yard work can help.  Try yoga or keke chi. These techniques combine exercise and meditation. You may need some training at first to learn them.  Do something you enjoy. For example, listen to music or go to a movie. Practice your  "hobby or do volunteer work.  Meditate. This can help you relax, because you are not worrying about what happened before or what may happen in the future.  Do guided imagery. Imagine yourself in any setting that helps you feel calm. You can use online videos, books, or a teacher to guide you.  Do breathing exercises. For example:  From a standing position, bend forward from the waist with your knees slightly bent. Let your arms dangle close to the floor.  Breathe in slowly and deeply as you return to a standing position. Roll up slowly and lift your head last.  Hold your breath for just a few seconds in the standing position.  Breathe out slowly and bend forward from the waist.  Let your feelings out. Talk, laugh, cry, and express anger when you need to. Talking with supportive friends or family, a counselor, or a david leader about your feelings is a healthy way to relieve stress. Avoid discussing your feelings with people who make you feel worse.  Write. It may help to write about things that are bothering you. This helps you find out how much stress you feel and what is causing it. When you know this, you can find better ways to cope.  What can you do to prevent stress?  You might try some of these things to help prevent stress:  Manage your time. This helps you find time to do the things you want and need to do.  Get enough sleep. Your body recovers from the stresses of the day while you are sleeping.  Get support. Your family, friends, and community can make a difference in how you experience stress.  Limit your news feed. Avoid or limit time on social media or news that may make you feel stressed.  Do something active. Exercise or activity can help reduce stress. Walking is a great way to get started.  Where can you learn more?  Go to https://www.healthwise.net/patiented  Enter N032 in the search box to learn more about \"Learning About Stress.\"  Current as of: February 26, 2023               Content Version: " 13.8    1681-0250 [employer].   Care instructions adapted under license by your healthcare professional. If you have questions about a medical condition or this instruction, always ask your healthcare professional. [employer] disclaims any warranty or liability for your use of this information.      Learning About Depression Screening  What is depression screening?  Depression screening is a way to see if you have depression symptoms. It may be done by a doctor or counselor. It's often part of a routine checkup. That's because your mental health is just as important as your physical health.  Depression is a mental health condition that affects how you feel, think, and act. You may:  Have less energy.  Lose interest in your daily activities.  Feel sad and grouchy for a long time.  Depression is very common. It affects people of all ages.  Many things can lead to depression. Some people become depressed after they have a stroke or find out they have a major illness like cancer or heart disease. The death of a loved one or a breakup may lead to depression. It can run in families. Most experts believe that a combination of inherited genes and stressful life events can cause it.  What happens during screening?  You may be asked to fill out a form about your depression symptoms. You and the doctor will discuss your answers. The doctor may ask you more questions to learn more about how you think, act, and feel.  What happens after screening?  If you have symptoms of depression, your doctor will talk to you about your options.  Doctors usually treat depression with medicines or counseling. Often, combining the two works best. Many people don't get help because they think that they'll get over the depression on their own. But people with depression may not get better unless they get treatment.  The cause of depression is not well understood. There may be many factors involved. But if you have  "depression, it's not your fault.  A serious symptom of depression is thinking about death or suicide. If you or someone you care about talks about this or about feeling hopeless, get help right away.  It's important to know that depression can be treated. Medicine, counseling, and self-care may help.  Where can you learn more?  Go to https://www.[employer].net/patiented  Enter T185 in the search box to learn more about \"Learning About Depression Screening.\"  Current as of: June 25, 2023               Content Version: 13.8    0074-5981 [employer].   Care instructions adapted under license by your healthcare professional. If you have questions about a medical condition or this instruction, always ask your healthcare professional. [employer] disclaims any warranty or liability for your use of this information.      " verbal cues/nonverbal cues (demo/gestures)/1 person assist

## 2024-05-06 NOTE — PROVIDER CONTACT NOTE (OTHER) - SITUATION
Despite the education and encouragement patient refused heparin injection. Multiple attempts made. Still refused.
Patient /85
PT REFUSED HEPARIN, STATED HE DOESN'T NEED IT ANYMORE
Vaginal Delivery

## 2024-08-20 NOTE — ED ADULT TRIAGE NOTE - TEMPERATURE IN FAHRENHEIT (DEGREES F)
[_____] : dust mites ([unfilled]) [Allergy Testing Mixed Feathers] : feathers [Allergy Testing Cockroach] : cockroach [Allergy Testing Dog] : dog [Allergy Testing Cat] : cat [] : molds [Allergy Testing Trees] : trees [Allergy Testing Weeds] : weeds [Allergy Testing Grasses] : grasses 97.8

## 2025-02-24 ENCOUNTER — INPATIENT (INPATIENT)
Facility: HOSPITAL | Age: 80
LOS: 1 days | Discharge: HOME | End: 2025-02-26
Attending: HOSPITALIST | Admitting: HOSPITALIST
Payer: MEDICARE

## 2025-02-24 VITALS
WEIGHT: 164.91 LBS | RESPIRATION RATE: 18 BRPM | OXYGEN SATURATION: 97 % | SYSTOLIC BLOOD PRESSURE: 161 MMHG | HEART RATE: 74 BPM | TEMPERATURE: 99 F | DIASTOLIC BLOOD PRESSURE: 81 MMHG

## 2025-02-24 DIAGNOSIS — E11.9 TYPE 2 DIABETES MELLITUS WITHOUT COMPLICATIONS: ICD-10-CM

## 2025-02-24 DIAGNOSIS — I48.91 UNSPECIFIED ATRIAL FIBRILLATION: ICD-10-CM

## 2025-02-24 DIAGNOSIS — N40.0 BENIGN PROSTATIC HYPERPLASIA WITHOUT LOWER URINARY TRACT SYMPTOMS: ICD-10-CM

## 2025-02-24 DIAGNOSIS — Z98.89 OTHER SPECIFIED POSTPROCEDURAL STATES: Chronic | ICD-10-CM

## 2025-02-24 DIAGNOSIS — I10 ESSENTIAL (PRIMARY) HYPERTENSION: ICD-10-CM

## 2025-02-24 DIAGNOSIS — N18.6 END STAGE RENAL DISEASE: ICD-10-CM

## 2025-02-24 DIAGNOSIS — R55 SYNCOPE AND COLLAPSE: ICD-10-CM

## 2025-02-24 DIAGNOSIS — Z29.9 ENCOUNTER FOR PROPHYLACTIC MEASURES, UNSPECIFIED: ICD-10-CM

## 2025-02-24 PROBLEM — N18.9 CHRONIC KIDNEY DISEASE, UNSPECIFIED: Chronic | Status: ACTIVE | Noted: 2023-10-08

## 2025-02-24 PROBLEM — I63.9 CEREBRAL INFARCTION, UNSPECIFIED: Chronic | Status: ACTIVE | Noted: 2023-10-08

## 2025-02-24 LAB
ADD ON TEST-SPECIMEN IN LAB: SIGNIFICANT CHANGE UP
ALBUMIN SERPL ELPH-MCNC: 4.1 G/DL — SIGNIFICANT CHANGE UP (ref 3.3–5)
ALP SERPL-CCNC: 56 U/L — SIGNIFICANT CHANGE UP (ref 40–120)
ALT FLD-CCNC: 7 U/L — SIGNIFICANT CHANGE UP (ref 4–41)
ANION GAP SERPL CALC-SCNC: 18 MMOL/L — HIGH (ref 7–14)
APTT BLD: 28.3 SEC — SIGNIFICANT CHANGE UP (ref 24.5–35.6)
AST SERPL-CCNC: 7 U/L — SIGNIFICANT CHANGE UP (ref 4–40)
BASOPHILS # BLD AUTO: 0.03 K/UL — SIGNIFICANT CHANGE UP (ref 0–0.2)
BASOPHILS NFR BLD AUTO: 0.6 % — SIGNIFICANT CHANGE UP (ref 0–2)
BILIRUB SERPL-MCNC: 0.3 MG/DL — SIGNIFICANT CHANGE UP (ref 0.2–1.2)
BUN SERPL-MCNC: 57 MG/DL — HIGH (ref 7–23)
CALCIUM SERPL-MCNC: 9.3 MG/DL — SIGNIFICANT CHANGE UP (ref 8.4–10.5)
CHLORIDE SERPL-SCNC: 95 MMOL/L — LOW (ref 98–107)
CK SERPL-CCNC: 63 U/L — SIGNIFICANT CHANGE UP (ref 30–200)
CO2 SERPL-SCNC: 27 MMOL/L — SIGNIFICANT CHANGE UP (ref 22–31)
CREAT SERPL-MCNC: 8.44 MG/DL — HIGH (ref 0.5–1.3)
EGFR: 6 ML/MIN/1.73M2 — LOW
EOSINOPHIL # BLD AUTO: 0.05 K/UL — SIGNIFICANT CHANGE UP (ref 0–0.5)
EOSINOPHIL NFR BLD AUTO: 0.9 % — SIGNIFICANT CHANGE UP (ref 0–6)
GLUCOSE BLDC GLUCOMTR-MCNC: 163 MG/DL — HIGH (ref 70–99)
GLUCOSE BLDC GLUCOMTR-MCNC: 164 MG/DL — HIGH (ref 70–99)
GLUCOSE BLDC GLUCOMTR-MCNC: 88 MG/DL — SIGNIFICANT CHANGE UP (ref 70–99)
GLUCOSE SERPL-MCNC: 142 MG/DL — HIGH (ref 70–99)
HCT VFR BLD CALC: 37.8 % — LOW (ref 39–50)
HGB BLD-MCNC: 12.5 G/DL — LOW (ref 13–17)
IANC: 3.51 K/UL — SIGNIFICANT CHANGE UP (ref 1.8–7.4)
IMM GRANULOCYTES NFR BLD AUTO: 0.4 % — SIGNIFICANT CHANGE UP (ref 0–0.9)
INR BLD: 0.92 RATIO — SIGNIFICANT CHANGE UP (ref 0.85–1.16)
LYMPHOCYTES # BLD AUTO: 1.26 K/UL — SIGNIFICANT CHANGE UP (ref 1–3.3)
LYMPHOCYTES # BLD AUTO: 23.3 % — SIGNIFICANT CHANGE UP (ref 13–44)
MAGNESIUM SERPL-MCNC: 2.7 MG/DL — HIGH (ref 1.6–2.6)
MCHC RBC-ENTMCNC: 29.1 PG — SIGNIFICANT CHANGE UP (ref 27–34)
MCHC RBC-ENTMCNC: 33.1 G/DL — SIGNIFICANT CHANGE UP (ref 32–36)
MCV RBC AUTO: 87.9 FL — SIGNIFICANT CHANGE UP (ref 80–100)
MONOCYTES # BLD AUTO: 0.54 K/UL — SIGNIFICANT CHANGE UP (ref 0–0.9)
MONOCYTES NFR BLD AUTO: 10 % — SIGNIFICANT CHANGE UP (ref 2–14)
NEUTROPHILS # BLD AUTO: 3.51 K/UL — SIGNIFICANT CHANGE UP (ref 1.8–7.4)
NEUTROPHILS NFR BLD AUTO: 64.8 % — SIGNIFICANT CHANGE UP (ref 43–77)
NRBC # BLD AUTO: 0 K/UL — SIGNIFICANT CHANGE UP (ref 0–0)
NRBC # FLD: 0 K/UL — SIGNIFICANT CHANGE UP (ref 0–0)
NRBC BLD AUTO-RTO: 0 /100 WBCS — SIGNIFICANT CHANGE UP (ref 0–0)
PHOSPHATE SERPL-MCNC: 4.7 MG/DL — HIGH (ref 2.5–4.5)
PLATELET # BLD AUTO: 175 K/UL — SIGNIFICANT CHANGE UP (ref 150–400)
POTASSIUM SERPL-MCNC: 3.9 MMOL/L — SIGNIFICANT CHANGE UP (ref 3.5–5.3)
POTASSIUM SERPL-SCNC: 3.9 MMOL/L — SIGNIFICANT CHANGE UP (ref 3.5–5.3)
PROT SERPL-MCNC: 7.4 G/DL — SIGNIFICANT CHANGE UP (ref 6–8.3)
PROTHROM AB SERPL-ACNC: 11 SEC — SIGNIFICANT CHANGE UP (ref 9.9–13.4)
RBC # BLD: 4.3 M/UL — SIGNIFICANT CHANGE UP (ref 4.2–5.8)
RBC # FLD: 13.8 % — SIGNIFICANT CHANGE UP (ref 10.3–14.5)
SODIUM SERPL-SCNC: 140 MMOL/L — SIGNIFICANT CHANGE UP (ref 135–145)
TROPONIN T, HIGH SENSITIVITY RESULT: 111 NG/L — CRITICAL HIGH
TROPONIN T, HIGH SENSITIVITY RESULT: 94 NG/L — CRITICAL HIGH
WBC # BLD: 5.41 K/UL — SIGNIFICANT CHANGE UP (ref 3.8–10.5)
WBC # FLD AUTO: 5.41 K/UL — SIGNIFICANT CHANGE UP (ref 3.8–10.5)

## 2025-02-24 PROCEDURE — 99285 EMERGENCY DEPT VISIT HI MDM: CPT

## 2025-02-24 PROCEDURE — 71045 X-RAY EXAM CHEST 1 VIEW: CPT | Mod: 26

## 2025-02-24 PROCEDURE — 70450 CT HEAD/BRAIN W/O DYE: CPT | Mod: 26

## 2025-02-24 PROCEDURE — 99223 1ST HOSP IP/OBS HIGH 75: CPT | Mod: GC

## 2025-02-24 RX ORDER — GLUCAGON 3 MG/1
1 POWDER NASAL ONCE
Refills: 0 | Status: DISCONTINUED | OUTPATIENT
Start: 2025-02-24 | End: 2025-02-26

## 2025-02-24 RX ORDER — NIFEDIPINE 30 MG
1 TABLET, EXTENDED RELEASE 24 HR ORAL
Refills: 0 | DISCHARGE

## 2025-02-24 RX ORDER — HEPARIN SODIUM 1000 [USP'U]/ML
5000 INJECTION INTRAVENOUS; SUBCUTANEOUS EVERY 8 HOURS
Refills: 0 | Status: DISCONTINUED | OUTPATIENT
Start: 2025-02-24 | End: 2025-02-26

## 2025-02-24 RX ORDER — INSULIN LISPRO 100 U/ML
INJECTION, SOLUTION INTRAVENOUS; SUBCUTANEOUS
Refills: 0 | Status: DISCONTINUED | OUTPATIENT
Start: 2025-02-24 | End: 2025-02-26

## 2025-02-24 RX ORDER — TAMSULOSIN HYDROCHLORIDE 0.4 MG/1
0.4 CAPSULE ORAL AT BEDTIME
Refills: 0 | Status: DISCONTINUED | OUTPATIENT
Start: 2025-02-24 | End: 2025-02-26

## 2025-02-24 RX ORDER — TRAZODONE HCL 100 MG
100 TABLET ORAL AT BEDTIME
Refills: 0 | Status: DISCONTINUED | OUTPATIENT
Start: 2025-02-24 | End: 2025-02-26

## 2025-02-24 RX ORDER — DEXTROSE 50 % IN WATER 50 %
25 SYRINGE (ML) INTRAVENOUS ONCE
Refills: 0 | Status: DISCONTINUED | OUTPATIENT
Start: 2025-02-24 | End: 2025-02-26

## 2025-02-24 RX ORDER — NIFEDIPINE 30 MG
90 TABLET, EXTENDED RELEASE 24 HR ORAL DAILY
Refills: 0 | Status: DISCONTINUED | OUTPATIENT
Start: 2025-02-24 | End: 2025-02-26

## 2025-02-24 RX ORDER — DEXTROSE 50 % IN WATER 50 %
15 SYRINGE (ML) INTRAVENOUS ONCE
Refills: 0 | Status: DISCONTINUED | OUTPATIENT
Start: 2025-02-24 | End: 2025-02-26

## 2025-02-24 RX ORDER — ORAL SEMAGLUTIDE 14 MG/1
1 TABLET ORAL
Refills: 0 | DISCHARGE

## 2025-02-24 RX ORDER — TAMSULOSIN HYDROCHLORIDE 0.4 MG/1
1 CAPSULE ORAL
Refills: 0 | DISCHARGE

## 2025-02-24 RX ORDER — INSULIN LISPRO 100 U/ML
INJECTION, SOLUTION INTRAVENOUS; SUBCUTANEOUS AT BEDTIME
Refills: 0 | Status: DISCONTINUED | OUTPATIENT
Start: 2025-02-24 | End: 2025-02-26

## 2025-02-24 RX ORDER — DEXTROSE 50 % IN WATER 50 %
12.5 SYRINGE (ML) INTRAVENOUS ONCE
Refills: 0 | Status: DISCONTINUED | OUTPATIENT
Start: 2025-02-24 | End: 2025-02-26

## 2025-02-24 RX ORDER — SODIUM CHLORIDE 9 G/1000ML
1000 INJECTION, SOLUTION INTRAVENOUS
Refills: 0 | Status: DISCONTINUED | OUTPATIENT
Start: 2025-02-24 | End: 2025-02-26

## 2025-02-24 RX ORDER — TRAZODONE HCL 100 MG
1 TABLET ORAL
Refills: 0 | DISCHARGE

## 2025-02-24 RX ORDER — INFLUENZA A VIRUS A/IDAHO/07/2018 (H1N1) ANTIGEN (MDCK CELL DERIVED, PROPIOLACTONE INACTIVATED, INFLUENZA A VIRUS A/INDIANA/08/2018 (H3N2) ANTIGEN (MDCK CELL DERIVED, PROPIOLACTONE INACTIVATED), INFLUENZA B VIRUS B/SINGAPORE/INFTT-16-0610/2016 ANTIGEN (MDCK CELL DERIVED, PROPIOLACTONE INACTIVATED), INFLUENZA B VIRUS B/IOWA/06/2017 ANTIGEN (MDCK CELL DERIVED, PROPIOLACTONE INACTIVATED) 15; 15; 15; 15 UG/.5ML; UG/.5ML; UG/.5ML; UG/.5ML
0.5 INJECTION, SUSPENSION INTRAMUSCULAR ONCE
Refills: 0 | Status: DISCONTINUED | OUTPATIENT
Start: 2025-02-24 | End: 2025-02-26

## 2025-02-24 RX ADMIN — TAMSULOSIN HYDROCHLORIDE 0.4 MILLIGRAM(S): 0.4 CAPSULE ORAL at 23:20

## 2025-02-24 RX ADMIN — HEPARIN SODIUM 5000 UNIT(S): 1000 INJECTION INTRAVENOUS; SUBCUTANEOUS at 23:20

## 2025-02-24 RX ADMIN — Medication 100 MILLIGRAM(S): at 23:20

## 2025-02-24 NOTE — ED PROVIDER NOTE - PROGRESS NOTE DETAILS
Sweta WILDE, PGY-2;  Review of labs indicate elevated troponin, CBC within normal limits, urine creatinine baseline, CT head with no signs of intracranial hemorrhage.  Chronic chronic changes identified.  Awaiting repeat troponin.  Likely admit for syncope workup. Sweta WILDE, PGY-2;  [facility] will [facility] to [facility] service.

## 2025-02-24 NOTE — H&P ADULT - PROBLEM SELECTOR PLAN 4
Tu, Th, Sa. Last full run Sa. Neph Dr. Ray Dobbs at [facility], no fluid overload, no urgent indicator for dialysis.  House neph consult in AM for dialysis

## 2025-02-24 NOTE — ED PROVIDER NOTE - ATTENDING CONTRIBUTION TO CARE
78M who has a past medical history of HTN HLD DM2 CAD/ 1 stent AF/no AC, ESRD on dialysis CVA with no residual deficits PTED for syncopal episodes as described last one a couple of days ago associated with fall   Pe as documented normal gait no neuro defiicts  plan admit for syncope w/u including echo monitor trops ekg  Will reassess

## 2025-02-24 NOTE — ED ADULT NURSE NOTE - CHIEF COMPLAINT QUOTE
Pt. c/o dizziness and feeling off balance x few weeks. States he fell last Thursday, hitting his head. Healing wound noted to forehead. Denies LOC. hx of ESRD, HTN, DM. Pt. refused FS in triage.

## 2025-02-24 NOTE — ED PROVIDER NOTE - OBJECTIVE STATEMENT
79-year-old male with past medical history of CAD status post 1 stent, hyperlipidemia, diabetes, hypertension, A-fib no longer on anticoagulation reports only aspirin, CVA with no residual deficits, ESRD on HD Tues, Thurs, Sat (Dr. Ray Dobbs) presenting to ED s/p syncopal episode 4 days ago. The patient states he was at the elevator, felt nausea lightheaded and fell to the ground.  Falling forward onto his head.  Denies LOC, vomiting, headache following the incident.  Since that episode has 2 more episodes of presyncope, feeling nauseous and lightheaded, without actual syncope.  Patient denies chest pain, shortness of breath, fever, chills, body aches, abdominal pain, flank pain, dysuria.  Patient states last echo was 9/28/2022, at that time patient calcified aortic valve, no documented EF.

## 2025-02-24 NOTE — H&P ADULT - NSHPREVIEWOFSYSTEMS_GEN_ALL_CORE
CONSTITUTIONAL: No fever or chill  HEENT: Denies changes in vision and hearing.  RESPIRATORY: Denies SOB and cough.  CV: Deneis CP.   GI: Denies abdominal pain, nausea, vomiting and diarrhea.  : Denies dysuria and urinary frequency.  MSK: Denies myalgia and joint pain.  SKIN: Denies rash   NEUROLOGICAL: near syncope.

## 2025-02-24 NOTE — H&P ADULT - NSHPPHYSICALEXAM_GEN_ALL_CORE
GENERAL: Alert. No acute distress.   EYES: EOMI grossly normal. Anicteric.   HENT: Moist mucous membranes. 2 cm, abrasion above left eyebrow. No active bleeding  RESP: No conversation dyspnea, no resp distress, CTAB   CARDIOVASCULAR: RRR, no m/g/r, no pedal edema. left arm AV fistula, good thrill  ABDOMEN: soft, non distended, nttp  MSK: ROM grossly normal in all 4 extremities. No deformities  SKIN: warm and dry  NEUROLOGIC: Alert and oriented x3, moving all 4 extremities spontaneously  PSYCHIATRIC: Cooperative. Appropriate mood and affect

## 2025-02-24 NOTE — H&P ADULT - NSHPLABSRESULTS_GEN_ALL_CORE
LABS:                         12.5   5.41  )-----------( 175      ( 24 Feb 2025 11:20 )             37.8     02-24    140  |  95[L]  |  57[H]  ----------------------------<  142[H]  3.9   |  27  |  8.44[H]    Ca    9.3      24 Feb 2025 11:20  Phos  4.7     02-24  Mg     2.70     02-24    TPro  7.4  /  Alb  4.1  /  TBili  0.3  /  DBili  x   /  AST  7   /  ALT  7   /  AlkPhos  56  02-24    PT/INR - ( 24 Feb 2025 11:20 )   PT: 11.0 sec;   INR: 0.92 ratio         PTT - ( 24 Feb 2025 11:20 )  PTT:28.3 sec  Urinalysis Basic - ( 24 Feb 2025 11:20 )    Color: x / Appearance: x / SG: x / pH: x  Gluc: 142 mg/dL / Ketone: x  / Bili: x / Urobili: x   Blood: x / Protein: x / Nitrite: x   Leuk Esterase: x / RBC: x / WBC x   Sq Epi: x / Non Sq Epi: x / Bacteria: x    RADIOLOGY, EKG & ADDITIONAL TESTS:

## 2025-02-24 NOTE — PATIENT PROFILE ADULT - FALL HARM RISK - HARM RISK INTERVENTIONS

## 2025-02-24 NOTE — H&P ADULT - ASSESSMENT
79-year-old male with past medical history of CAD status post 1 stent (4-5 years ago), hyperlipidemia, diabetes, hypertension, A-fib no longer on anticoagulation reports only aspirin, CVA with no residual deficits, CKD who presents for multiple presyncopal episode

## 2025-02-24 NOTE — H&P ADULT - ATTENDING COMMENTS
Mr. Ogden is an 79-year-old male w/ PMH of CAD s/p stenting, atrial fibrillation (not on A/C), prior CVA, and ESRD on HD (T/Th/Sa) who presents with 2 recent presyncopal episodes associated with lightheadedness. He sustained a forehead laceration with one episode but denies any LOC during these episodes. Denied any preceding or associated CP, SOB, nausea, vomiting, HA, weakness. Initial cardiac workup shows T-wave inversions in V4-V6 on EKG, though unchanged from prior EKG. Also notable for elevated troponin consistent however this may be related to his ESRD. Etiology of symptoms unclear.   - Cardiac monitoring with telemetry  - Check TTE   - Check Orthostatic BP  - PT evaluation with fall precautions  - Electrolyte monitoring  - Nephrology consultation for dialysis

## 2025-02-24 NOTE — H&P ADULT - TIME BILLING
- Ordering, reviewing, and interpreting labs, testing, and imaging   - Independently obtaining a review of systems and performing a physical exam   - Reviewing prior hospitalization and where necessary, outpatient records   - Reviewing consultant recommendations/communicating with consultants   - Counselling and educating patient and family regarding interpretation of aforementioned items and plan of care

## 2025-02-24 NOTE — PATIENT PROFILE ADULT - FALL HARM RISK - FALLEN IN PAST
Recently had surgery of the cervical spine complicated with wound infection and has been on antibiotics as an outpatient per  Continue OP ABX regimen  ID following   Accidental fall

## 2025-02-24 NOTE — H&P ADULT - PROBLEM SELECTOR PLAN 1
The level of diabetic retinopathy was communicated to provider. Pt has multiple near syncopal episode, no true syncope. No LOC.   in ED, inverted T wave in lead V4-6, similar to piror. Trop elevated however this is in setting of ESRD, delta change less than 30%. No CP no SOB. While it is possible to be cardiac syncope, current suspicion is low. Also consider fluid shift due to dialysis, however, not  correlate with dialysis timing. Considered orthostatic since pt has a history of this, however, pt was not actively changing position at the time.   Plan to   - orthostatic BP  - echo  - cardiac monitoring   - PT eval  - lytes  - fall precaution  - outpt card follow with cardiologist Dr. Ruben Nazario at [facility]

## 2025-02-24 NOTE — ED PROVIDER NOTE - PHYSICAL EXAMINATION
Physical Exam:  Gen: NAD, AOx3, non-toxic appearing, able to ambulate without assistance  Head: NCAT  HEENT: EOMI, PEERLA, normal conjunctiva, tongue midline, oral mucosa moist  Lung: CTAB, no respiratory distress, no wheezes/rhonchi/rales B/L, speaking in full sentences  CV: RRR, no murmurs, rubs or gallops  Abd: soft, NT, ND, no guarding, no rigidity, no rebound tenderness, no CVA tenderness   MSK: no visible deformities, ROM normal in UE/LE, no back pain  Neuro: CN II-XII intact, no dysmetria, dysarthria or dysdyadokinesia, normal gait  Skin: abrasion L forehead, no active bleeding or open wound   Psych: normal affect, calm
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
## 2025-02-24 NOTE — ED ADULT NURSE REASSESSMENT NOTE - NS ED NURSE REASSESS COMMENT FT1
Pt resting in stretcher, A&Ox4, RR even and unlabored on RA, spo2 100%, remains on cardiac monitor noted to be NSR. Pt denies dizziness. VS and fingerstick obtained. pending bed assignment. safety maintained.
Report received from KIMBERLY Damon, pt A&Ox4, resting in stretcher, RR even and unlabored. spo2 100% on RA. remains on cardiac monitor noted to be NSR. VS obtained. pending transport. safety maintained.
report received from KIMBERLY Lo, Pt resting in stretcher, A&Ox4, RR even and unlabored on RA, spo2 100%, remains on cardiac monitor noted to be NSR. Pt reports dizziness has improved. pending lab results and imaging. safety maintained.

## 2025-02-24 NOTE — H&P ADULT - HISTORY OF PRESENT ILLNESS
78-year-old male with past medical history of CAD status post 1 stent (4-5 years ago), hyperlipidemia, diabetes, hypertension, A-fib no longer on anticoagulation reports only aspirin, CVA with no residual deficits, CKD who presents for multiple presyncopal episode. Pt  79-year-old male with past medical history of CAD status post 1 stent (4-5 years ago), hyperlipidemia, diabetes, hypertension, A-fib no longer on anticoagulation reports only aspirin, CVA with no residual deficits, CKD who presents for multiple presyncopal episode. First episode on 2/20, piror to dialysis. second episode was during, and thrid episode on 2/22 was after dialysis. Pt describe this as having a "weird whole body rush" went thru him. He fell forward during the first near syncopal episode, hitting his left sided head, able to get up with assistance afterwards. No LOC. Pt also endorse frequent orthostatic hypotension with positional changes. No CP, SOB, N/V at time of incident. No passing out. ROS otherwise negative. No dizziness at time of interview.     Pt's med recs states he is on lasix 40 mg QD and coreg 25 mg QD. However, per pharmacy, last filled 90 days supply in Aug 2023 and June 2023 respectively, which would be both out by now. Pt also states he only takes his coreg QD instead of BID

## 2025-02-24 NOTE — ED ADULT NURSE NOTE - CHPI ED NUR SYMPTOMS NEG
no back pain/no chest pain/no chills/no congestion/no diaphoresis/no nausea/no shortness of breath/no syncope/no vomiting

## 2025-02-24 NOTE — ED ADULT NURSE NOTE - NSFALLHARMRISKINTERV_ED_ALL_ED

## 2025-02-24 NOTE — ED ADULT NURSE NOTE - NSFALLRISKFACTORS_ED_ALL_ED
Neurology Age: 85 years old or older/Bone Condition: Including osteoporosis, prolonged steroid use or metastatic bone disease/cancer

## 2025-02-24 NOTE — ED ADULT NURSE NOTE - OBJECTIVE STATEMENT
ER pt coming stated have been having dizziness x 2 days,  denies CP, no SOB, Hx. Dialysis (T/TH/Sat),  left arm fistula. A.FIB, cardiac stent x1,   hx. blood tranf. in the past, on CM Sinus Rhythm with sinus arrhythmia with 1st degree AV block.    Labs sent, IV to lat. arm 20g angio cath place.    Pending dispo.  Teresita Small RN

## 2025-02-24 NOTE — ED ADULT TRIAGE NOTE - CHIEF COMPLAINT QUOTE
Pt. c/o dizziness and feeling off balance x few weeks. States he fell last Thursday, hitting his head. Healing wound noted to forehead. Denies LOC. hx of ESRD, HTN, DM Pt. c/o dizziness and feeling off balance x few weeks. States he fell last Thursday, hitting his head. Healing wound noted to forehead. Denies LOC. hx of ESRD, HTN, DM. Pt. refused FS in triage.

## 2025-02-25 ENCOUNTER — RESULT REVIEW (OUTPATIENT)
Age: 80
End: 2025-02-25

## 2025-02-25 LAB
A1C WITH ESTIMATED AVERAGE GLUCOSE RESULT: 6.6 % — HIGH (ref 4–5.6)
ALBUMIN SERPL ELPH-MCNC: 3.8 G/DL — SIGNIFICANT CHANGE UP (ref 3.3–5)
ALP SERPL-CCNC: 53 U/L — SIGNIFICANT CHANGE UP (ref 40–120)
ALT FLD-CCNC: 5 U/L — SIGNIFICANT CHANGE UP (ref 4–41)
ALT FLD-CCNC: 6 U/L — SIGNIFICANT CHANGE UP (ref 4–41)
ANION GAP SERPL CALC-SCNC: 18 MMOL/L — HIGH (ref 7–14)
AST SERPL-CCNC: 6 U/L — SIGNIFICANT CHANGE UP (ref 4–40)
BASOPHILS # BLD AUTO: 0.04 K/UL — SIGNIFICANT CHANGE UP (ref 0–0.2)
BASOPHILS NFR BLD AUTO: 0.7 % — SIGNIFICANT CHANGE UP (ref 0–2)
BILIRUB SERPL-MCNC: 0.3 MG/DL — SIGNIFICANT CHANGE UP (ref 0.2–1.2)
BUN SERPL-MCNC: 68 MG/DL — HIGH (ref 7–23)
CALCIUM SERPL-MCNC: 9.3 MG/DL — SIGNIFICANT CHANGE UP (ref 8.4–10.5)
CHLORIDE SERPL-SCNC: 94 MMOL/L — LOW (ref 98–107)
CO2 SERPL-SCNC: 27 MMOL/L — SIGNIFICANT CHANGE UP (ref 22–31)
CREAT SERPL-MCNC: 9.12 MG/DL — HIGH (ref 0.5–1.3)
DIALYSIS INSTRUMENT RESULT - HEPATITIS B SURFACE ANTIGEN: NEGATIVE — SIGNIFICANT CHANGE UP
EGFR: 5 ML/MIN/1.73M2 — LOW
EOSINOPHIL # BLD AUTO: 0.11 K/UL — SIGNIFICANT CHANGE UP (ref 0–0.5)
EOSINOPHIL NFR BLD AUTO: 1.8 % — SIGNIFICANT CHANGE UP (ref 0–6)
ESTIMATED AVERAGE GLUCOSE: 143 — SIGNIFICANT CHANGE UP
GLUCOSE BLDC GLUCOMTR-MCNC: 105 MG/DL — HIGH (ref 70–99)
GLUCOSE BLDC GLUCOMTR-MCNC: 114 MG/DL — HIGH (ref 70–99)
GLUCOSE BLDC GLUCOMTR-MCNC: 122 MG/DL — HIGH (ref 70–99)
GLUCOSE BLDC GLUCOMTR-MCNC: 147 MG/DL — HIGH (ref 70–99)
GLUCOSE SERPL-MCNC: 87 MG/DL — SIGNIFICANT CHANGE UP (ref 70–99)
HCT VFR BLD CALC: 37.2 % — LOW (ref 39–50)
HGB BLD-MCNC: 12.1 G/DL — LOW (ref 13–17)
IANC: 3.2 K/UL — SIGNIFICANT CHANGE UP (ref 1.8–7.4)
IMM GRANULOCYTES NFR BLD AUTO: 0.3 % — SIGNIFICANT CHANGE UP (ref 0–0.9)
LYMPHOCYTES # BLD AUTO: 2 K/UL — SIGNIFICANT CHANGE UP (ref 1–3.3)
LYMPHOCYTES # BLD AUTO: 33.4 % — SIGNIFICANT CHANGE UP (ref 13–44)
MAGNESIUM SERPL-MCNC: 2.8 MG/DL — HIGH (ref 1.6–2.6)
MCHC RBC-ENTMCNC: 28.6 PG — SIGNIFICANT CHANGE UP (ref 27–34)
MCHC RBC-ENTMCNC: 32.5 G/DL — SIGNIFICANT CHANGE UP (ref 32–36)
MCV RBC AUTO: 87.9 FL — SIGNIFICANT CHANGE UP (ref 80–100)
MONOCYTES # BLD AUTO: 0.61 K/UL — SIGNIFICANT CHANGE UP (ref 0–0.9)
MONOCYTES NFR BLD AUTO: 10.2 % — SIGNIFICANT CHANGE UP (ref 2–14)
NEUTROPHILS # BLD AUTO: 3.2 K/UL — SIGNIFICANT CHANGE UP (ref 1.8–7.4)
NEUTROPHILS NFR BLD AUTO: 53.6 % — SIGNIFICANT CHANGE UP (ref 43–77)
NRBC # BLD AUTO: 0 K/UL — SIGNIFICANT CHANGE UP (ref 0–0)
NRBC # FLD: 0 K/UL — SIGNIFICANT CHANGE UP (ref 0–0)
NRBC BLD AUTO-RTO: 0 /100 WBCS — SIGNIFICANT CHANGE UP (ref 0–0)
PHOSPHATE SERPL-MCNC: 6.1 MG/DL — HIGH (ref 2.5–4.5)
PLATELET # BLD AUTO: 179 K/UL — SIGNIFICANT CHANGE UP (ref 150–400)
POTASSIUM SERPL-MCNC: 3.8 MMOL/L — SIGNIFICANT CHANGE UP (ref 3.5–5.3)
POTASSIUM SERPL-SCNC: 3.8 MMOL/L — SIGNIFICANT CHANGE UP (ref 3.5–5.3)
PROT SERPL-MCNC: 7.2 G/DL — SIGNIFICANT CHANGE UP (ref 6–8.3)
RBC # BLD: 4.23 M/UL — SIGNIFICANT CHANGE UP (ref 4.2–5.8)
RBC # FLD: 13.7 % — SIGNIFICANT CHANGE UP (ref 10.3–14.5)
SODIUM SERPL-SCNC: 139 MMOL/L — SIGNIFICANT CHANGE UP (ref 135–145)
WBC # BLD: 5.98 K/UL — SIGNIFICANT CHANGE UP (ref 3.8–10.5)
WBC # FLD AUTO: 5.98 K/UL — SIGNIFICANT CHANGE UP (ref 3.8–10.5)

## 2025-02-25 PROCEDURE — 93306 TTE W/DOPPLER COMPLETE: CPT | Mod: 26

## 2025-02-25 PROCEDURE — 99233 SBSQ HOSP IP/OBS HIGH 50: CPT | Mod: GC

## 2025-02-25 RX ORDER — ASPIRIN 325 MG
81 TABLET ORAL DAILY
Refills: 0 | Status: DISCONTINUED | OUTPATIENT
Start: 2025-02-25 | End: 2025-02-26

## 2025-02-25 RX ADMIN — HEPARIN SODIUM 5000 UNIT(S): 1000 INJECTION INTRAVENOUS; SUBCUTANEOUS at 22:31

## 2025-02-25 RX ADMIN — Medication 667 MILLIGRAM(S): at 12:22

## 2025-02-25 RX ADMIN — Medication 100 MILLIGRAM(S): at 22:30

## 2025-02-25 RX ADMIN — TAMSULOSIN HYDROCHLORIDE 0.4 MILLIGRAM(S): 0.4 CAPSULE ORAL at 22:30

## 2025-02-25 RX ADMIN — Medication 81 MILLIGRAM(S): at 12:21

## 2025-02-25 RX ADMIN — Medication 0.2 MILLIGRAM(S): at 22:30

## 2025-02-25 RX ADMIN — Medication 0.2 MILLIGRAM(S): at 05:07

## 2025-02-25 RX ADMIN — Medication 1 APPLICATION(S): at 12:21

## 2025-02-25 RX ADMIN — Medication 667 MILLIGRAM(S): at 08:35

## 2025-02-25 RX ADMIN — Medication 90 MILLIGRAM(S): at 05:07

## 2025-02-25 RX ADMIN — HEPARIN SODIUM 5000 UNIT(S): 1000 INJECTION INTRAVENOUS; SUBCUTANEOUS at 12:19

## 2025-02-25 RX ADMIN — HEPARIN SODIUM 5000 UNIT(S): 1000 INJECTION INTRAVENOUS; SUBCUTANEOUS at 05:07

## 2025-02-25 RX ADMIN — Medication 667 MILLIGRAM(S): at 22:30

## 2025-02-25 NOTE — PHYSICAL THERAPY INITIAL EVALUATION ADULT - GENERAL OBSERVATIONS, REHAB EVAL
Pt encountered in semi-supine position in NAD, all lines intact, a&ox4, SPO2 100%, and RN Angela aware.

## 2025-02-25 NOTE — PROGRESS NOTE ADULT - PROBLEM SELECTOR PLAN 1
Pt has multiple near syncopal episode, no true syncope. No LOC.   in ED, inverted T wave in lead V4-6, similar to piror. Trop elevated however this is in setting of ESRD, delta change less than 30%. No CP no SOB. While it is possible to be cardiac syncope, current suspicion is low. Also consider fluid shift due to dialysis, however, not  correlate with dialysis timing. Considered orthostatic since pt has a history of this, however, pt was not actively changing position at the time.     - orthostatic BP: + Lying 142/67 HR 63, sitting 138/66 HR 78, Standing 128/51 HR 88  - echo  - cardiac monitoring No event overnight   - PT eval    - fall precaution  - outpt card follow with cardiologist Dr. Ruben Nazario at [facility]

## 2025-02-25 NOTE — PROGRESS NOTE ADULT - TIME BILLING
- Ordering, reviewing, and interpreting labs, testing, and imaging  - Independently obtaining a review of systems and performing a physical exam  - Reviewing consultant recommendations/communicating with consultants  - Counselling and educating patient's family regarding interpretation of aforementioned items and plan of care

## 2025-02-25 NOTE — PHYSICAL THERAPY INITIAL EVALUATION ADULT - ORIENTATION, REHAB EVAL
This patient was referred for audiometric/tympanometric testing by José Antonio Swartz DO for a yearly hearing check-up as part of her physical. Patient denied pain, aural fullness, drainage,and family history of hearing loss. Patient reported being able to hear her heartbeat when she lays down at night. She can hear it in both ears depending on which ear is against the pillow. Audiometry using pure tone air and bone conduction testing revealed hearing sensitivity within normal limits through 1000 Hz sloping to a moderate sensorineural hearing loss, in the right ear and hearing sensitivity within normal limits through 1000 Hz sloping to a moderately severe  sensorineural hearing loss, in the left ear. Reliability was good. Speech reception thresholds were in good agreement with the pure tone averages, bilaterally. Speech discrimination scores were excellent, bilaterally. Asymmetries noted [id number] Hz, left ear worse. Tympanometry revealed normal middle ear peak pressure and compliance, in the right ear and reduced compliance, in the left ear. The results were reviewed with the patient. ENT consult recommended if you feel it is necessary, due to asymmetrical hearing loss, left ear worse. Recommendations for follow up will be made pending physician consult.     Emre Bolden, [address], Mitch Burgess  Electronically signed by Mitch Lepe on 7/12/2022 at 2:28 PM
oriented to person, place, time and situation

## 2025-02-25 NOTE — PROGRESS NOTE ADULT - SUBJECTIVE AND OBJECTIVE BOX
HEMODIALYSIS NOTE ---Date of Service 02-25-25 @ 17:21  --------------------------------------------------------------------------------  Chief Complaint: ESRD/Ongoing hemodialysis requirement    24 hour events/subjective:    no complaints     PAST HISTORY  --------------------------------------------------------------------------------  No significant changes to PMH, PSH, FHx, SHx, unless otherwise noted    ALLERGIES & MEDICATIONS  --------------------------------------------------------------------------------  Allergies    No Known Allergies    Intolerances      Standing Inpatient Medications  aspirin enteric coated 81 milliGRAM(s) Oral daily  calcium acetate 667 milliGRAM(s) Oral three times a day with meals  chlorhexidine 2% Cloths 1 Application(s) Topical daily  cloNIDine 0.2 milliGRAM(s) Oral two times a day  dextrose 5%. 1000 milliLiter(s) IV Continuous <Continuous>  dextrose 5%. 1000 milliLiter(s) IV Continuous <Continuous>  dextrose 50% Injectable 25 Gram(s) IV Push once  dextrose 50% Injectable 12.5 Gram(s) IV Push once  dextrose 50% Injectable 25 Gram(s) IV Push once  glucagon  Injectable 1 milliGRAM(s) IntraMuscular once  heparin   Injectable 5000 Unit(s) SubCutaneous every 8 hours  influenza  Vaccine (HIGH DOSE) 0.5 milliLiter(s) IntraMuscular once  insulin lispro (ADMELOG) corrective regimen sliding scale   SubCutaneous three times a day before meals  insulin lispro (ADMELOG) corrective regimen sliding scale   SubCutaneous at bedtime  NIFEdipine XL 90 milliGRAM(s) Oral daily  tamsulosin 0.4 milliGRAM(s) Oral at bedtime  traZODone 100 milliGRAM(s) Oral at bedtime    PRN Inpatient Medications  dextrose Oral Gel 15 Gram(s) Oral once PRN  sodium chloride 0.9% Bolus. 100 milliLiter(s) IV Bolus every 5 minutes PRN      VITALS/PHYSICAL EXAM  --------------------------------------------------------------------------------  T(C): 36.3 (02-25-25 @ 12:15), Max: 36.6 (02-24-25 @ 17:37)  HR: 70 (02-25-25 @ 12:15) (63 - 78)  BP: 124/55 (02-25-25 @ 12:15) (124/55 - 164/69)  RR: 16 (02-25-25 @ 12:15) (15 - 17)  SpO2: 100% (02-25-25 @ 12:15) (100% - 100%)  Wt(kg): --    Weight (kg): 74.8 (02-24-25 @ 10:35)      Physical Exam:  	Gen: NAD, well-appearing  	HEENT: PERRL, supple neck, clear oropharynx  	Pulm: CTA B/L  	CV: RRR, S1S2; no rub  	Abd: +BS, soft, nontender/nondistended  	: No suprapubic tenderness  	UE: Warm, FROM, no clubbing, intact strength; no edema; no asterixis  	LE: Warm, FROM, no clubbing, intact strength; no edema  	Vascular access: avf    LABS/STUDIES  --------------------------------------------------------------------------------              12.1   5.98  >-----------<  179      [02-25-25 @ 04:15]              37.2     139  |  94  |  68  ----------------------------<  87      [02-25-25 @ 04:15]  3.8   |  27  |  9.12        Ca     9.3     [02-25-25 @ 04:15]      Mg     2.80     [02-25-25 @ 04:15]      Phos  6.1     [02-25-25 @ 04:15]    TPro  7.2  /  Alb  3.8  /  TBili  0.3  /  DBili  x   /  AST  6   /  ALT  6   /  AlkPhos  53  [02-25-25 @ 04:15]    PT/INR: PT 11.0 , INR 0.92       [02-24-25 @ 11:20]  PTT: 28.3       [02-24-25 @ 11:20]

## 2025-02-25 NOTE — PROGRESS NOTE ADULT - SUBJECTIVE AND OBJECTIVE BOX
Progress Note    02-24-25 (1d)    Patient is a 79y old  Male who presents with a chief complaint of Near syncope (24 Feb 2025 19:05)      Subjective / Overnight Events :  - No acute events overnight. 1st degree block on Tele, few PVCs. No other event. Orthostatic positive   - Pt seen and examined at bedside.     Additional ROS (if any):    MEDICATIONS  (STANDING):  aspirin enteric coated 81 milliGRAM(s) Oral daily  calcium acetate 667 milliGRAM(s) Oral three times a day with meals  chlorhexidine 2% Cloths 1 Application(s) Topical daily  cloNIDine 0.2 milliGRAM(s) Oral two times a day  dextrose 5%. 1000 milliLiter(s) (50 mL/Hr) IV Continuous <Continuous>  dextrose 5%. 1000 milliLiter(s) (100 mL/Hr) IV Continuous <Continuous>  dextrose 50% Injectable 25 Gram(s) IV Push once  dextrose 50% Injectable 12.5 Gram(s) IV Push once  dextrose 50% Injectable 25 Gram(s) IV Push once  glucagon  Injectable 1 milliGRAM(s) IntraMuscular once  heparin   Injectable 5000 Unit(s) SubCutaneous every 8 hours  influenza  Vaccine (HIGH DOSE) 0.5 milliLiter(s) IntraMuscular once  insulin lispro (ADMELOG) corrective regimen sliding scale   SubCutaneous three times a day before meals  insulin lispro (ADMELOG) corrective regimen sliding scale   SubCutaneous at bedtime  NIFEdipine XL 90 milliGRAM(s) Oral daily  tamsulosin 0.4 milliGRAM(s) Oral at bedtime  traZODone 100 milliGRAM(s) Oral at bedtime    MEDICATIONS  (PRN):  dextrose Oral Gel 15 Gram(s) Oral once PRN Blood Glucose LESS THAN 70 milliGRAM(s)/deciliter  sodium chloride 0.9% Bolus. 100 milliLiter(s) IV Bolus every 5 minutes PRN SBP LESS THAN or EQUAL to 80 mmHg      CAPILLARY BLOOD GLUCOSE      POCT Blood Glucose.: 114 mg/dL (25 Feb 2025 12:15)  POCT Blood Glucose.: 122 mg/dL (25 Feb 2025 08:28)  POCT Blood Glucose.: 164 mg/dL (24 Feb 2025 21:55)  POCT Blood Glucose.: 163 mg/dL (24 Feb 2025 21:17)  POCT Blood Glucose.: 88 mg/dL (24 Feb 2025 16:56)    I&O's Summary      PHYSICAL EXAM:  Vital Signs Last 24 Hrs  T(C): 36.6 (25 Feb 2025 05:05), Max: 36.6 (24 Feb 2025 17:37)  T(F): 97.9 (25 Feb 2025 05:05), Max: 97.9 (24 Feb 2025 21:07)  HR: 63 (25 Feb 2025 05:05) (63 - 78)  BP: 143/67 (25 Feb 2025 05:05) (134/70 - 164/69)  BP(mean): --  RR: 17 (25 Feb 2025 05:05) (15 - 17)  SpO2: 100% (25 Feb 2025 05:05) (100% - 100%)    Parameters below as of 25 Feb 2025 05:05  Patient On (Oxygen Delivery Method): room air        General: NAD, non-toxic appearing   HEENT: no scleral icterus  CV: RRR, normal S1 and S2, no m/r/g  Lungs: normal respiratory effort. CTAB, no wheezes, rales, or rhonchi  Abd: soft, nontender, nondistended  Ext: no edema, 2+ peripheral pulses   Pysch: AAOx3, appropriate affect   Neuro: grossly non-focal, moving all extremities spontaneously   Skin: no rashes or lesions     LABS:                          12.1   5.98  )-----------( 179      ( 25 Feb 2025 04:15 )             37.2       WBC Trend: 5.98<--, 5.41<--  Hb Trend: 12.1<--, 12.5<--    02-25    139  |  94[L]  |  68[H]  ----------------------------<  87  3.8   |  27  |  9.12[H]    Ca    9.3      25 Feb 2025 04:15  Phos  6.1     02-25  Mg     2.80     02-25    TPro  7.2  /  Alb  3.8  /  TBili  0.3  /  DBili  x   /  AST  6   /  ALT  6   /  AlkPhos  53  02-25    PT/INR - ( 24 Feb 2025 11:20 )   PT: 11.0 sec;   INR: 0.92 ratio         PTT - ( 24 Feb 2025 11:20 )  PTT:28.3 sec      Urinalysis Basic - ( 25 Feb 2025 04:15 )    Color: x / Appearance: x / SG: x / pH: x  Gluc: 87 mg/dL / Ketone: x  / Bili: x / Urobili: x   Blood: x / Protein: x / Nitrite: x   Leuk Esterase: x / RBC: x / WBC x   Sq Epi: x / Non Sq Epi: x / Bacteria: x            RADIOLOGY & ADDITIONAL TESTS: Reviewed

## 2025-02-25 NOTE — PHYSICAL THERAPY INITIAL EVALUATION ADULT - ACTIVE RANGE OF MOTION EXAMINATION, REHAB EVAL
zeb. upper extremity Active ROM was WNL (within normal limits)/bilateral lower extremity Active ROM was WNL (within normal limits)

## 2025-02-25 NOTE — PHYSICAL THERAPY INITIAL EVALUATION ADULT - ADDITIONAL COMMENTS
Pt left seated at edge of bed in NAD, all lines intact, call bell in reach, SPO2 100%, and RN Angela aware.

## 2025-02-26 ENCOUNTER — TRANSCRIPTION ENCOUNTER (OUTPATIENT)
Age: 80
End: 2025-02-26

## 2025-02-26 VITALS
HEART RATE: 73 BPM | TEMPERATURE: 98 F | DIASTOLIC BLOOD PRESSURE: 63 MMHG | RESPIRATION RATE: 18 BRPM | OXYGEN SATURATION: 96 % | SYSTOLIC BLOOD PRESSURE: 128 MMHG

## 2025-02-26 LAB
HAV IGM SER-ACNC: SIGNIFICANT CHANGE UP
HBV CORE AB SER-ACNC: SIGNIFICANT CHANGE UP
HBV CORE IGM SER-ACNC: SIGNIFICANT CHANGE UP
HBV SURFACE AB SER-ACNC: 980.9 MIU/ML — SIGNIFICANT CHANGE UP
HBV SURFACE AG SER-ACNC: SIGNIFICANT CHANGE UP
HCV AB S/CO SERPL IA: 0.11 S/CO — SIGNIFICANT CHANGE UP (ref 0–0.79)
HCV AB SERPL-IMP: SIGNIFICANT CHANGE UP
MRSA PCR RESULT.: SIGNIFICANT CHANGE UP
S AUREUS DNA NOSE QL NAA+PROBE: SIGNIFICANT CHANGE UP

## 2025-02-26 PROCEDURE — 99239 HOSP IP/OBS DSCHRG MGMT >30: CPT | Mod: GC

## 2025-02-26 RX ADMIN — HEPARIN SODIUM 5000 UNIT(S): 1000 INJECTION INTRAVENOUS; SUBCUTANEOUS at 13:09

## 2025-02-26 RX ADMIN — Medication 0.2 MILLIGRAM(S): at 08:37

## 2025-02-26 RX ADMIN — Medication 90 MILLIGRAM(S): at 05:14

## 2025-02-26 RX ADMIN — Medication 81 MILLIGRAM(S): at 11:04

## 2025-02-26 RX ADMIN — Medication 667 MILLIGRAM(S): at 08:37

## 2025-02-26 RX ADMIN — Medication 667 MILLIGRAM(S): at 12:57

## 2025-02-26 RX ADMIN — Medication 1 APPLICATION(S): at 11:04

## 2025-02-26 NOTE — PROGRESS NOTE ADULT - PROBLEM SELECTOR PLAN 2
Home regimen farxiga 5 mg and ozempic 1 mg weekly  Plan:  - hold home med  - LÓPEZ
Home regimen farxiga 5 mg and ozempic 1 mg weekly  Plan:  - hold home med  - LÓPEZ

## 2025-02-26 NOTE — PROGRESS NOTE ADULT - SUBJECTIVE AND OBJECTIVE BOX
[facility]   MD MIKE HOFFMAN MD RUORU WONG, PA    TEL:  FROM 9 AM to 5 PM ---OFFICE: [phone]  AVAILABLE ON TEAMS    FROM 5 PM - 9 AM PLEASE CALL ANSWERING SERVICE: [phone]    RENAL FOLLOW UP NOTE--Date of Service 02-26-25 @ 09:43  --------------------------------------------------------------------------------  HPI:      Pt seen and examined at bedside.   Jaya SOB, chest pain     PAST HISTORY  --------------------------------------------------------------------------------  No significant changes to PMH, PSH, FHx, SHx, unless otherwise noted    ALLERGIES & MEDICATIONS  --------------------------------------------------------------------------------  Allergies    No Known Allergies    Intolerances      Standing Inpatient Medications  aspirin enteric coated 81 milliGRAM(s) Oral daily  calcium acetate 667 milliGRAM(s) Oral three times a day with meals  chlorhexidine 2% Cloths 1 Application(s) Topical daily  cloNIDine 0.2 milliGRAM(s) Oral two times a day  dextrose 5%. 1000 milliLiter(s) IV Continuous <Continuous>  dextrose 5%. 1000 milliLiter(s) IV Continuous <Continuous>  dextrose 50% Injectable 25 Gram(s) IV Push once  dextrose 50% Injectable 12.5 Gram(s) IV Push once  dextrose 50% Injectable 25 Gram(s) IV Push once  glucagon  Injectable 1 milliGRAM(s) IntraMuscular once  heparin   Injectable 5000 Unit(s) SubCutaneous every 8 hours  influenza  Vaccine (HIGH DOSE) 0.5 milliLiter(s) IntraMuscular once  insulin lispro (ADMELOG) corrective regimen sliding scale   SubCutaneous three times a day before meals  insulin lispro (ADMELOG) corrective regimen sliding scale   SubCutaneous at bedtime  NIFEdipine XL 90 milliGRAM(s) Oral daily  tamsulosin 0.4 milliGRAM(s) Oral at bedtime  traZODone 100 milliGRAM(s) Oral at bedtime    PRN Inpatient Medications  dextrose Oral Gel 15 Gram(s) Oral once PRN  sodium chloride 0.9% Bolus. 100 milliLiter(s) IV Bolus every 5 minutes PRN      REVIEW OF SYSTEMS  --------------------------------------------------------------------------------  General: no fever  CVS: no chest pain    MSK: no edema     VITALS/PHYSICAL EXAM  --------------------------------------------------------------------------------  T(C): 36.7 (02-26-25 @ 05:10), Max: 36.9 (02-25-25 @ 17:15)  HR: 80 (02-26-25 @ 05:10) (67 - 80)  BP: 126/81 (02-26-25 @ 05:10) (124/55 - 151/75)  RR: 17 (02-26-25 @ 05:10) (16 - 18)  SpO2: 99% (02-26-25 @ 05:10) (99% - 100%)  Wt(kg): --    Weight (kg): 74.8 (02-24-25 @ 10:35)      02-25-25 @ 07:01  -  02-26-25 @ 07:00  --------------------------------------------------------  IN: 400 mL / OUT: 400 mL / NET: 0 mL      Physical Exam:  	Gen: NAD  	HEENT: MMM  	Pulm: CTA B/L  	CV: S1S2  	Abd: Soft, +BS  	Ext: No LE edema B/L                      Neuro: Awake   	Skin: Warm and Dry   	Vascular access: NO HD catheter            no  mane  LABS/STUDIES  --------------------------------------------------------------------------------              12.1   5.98  >-----------<  179      [02-25-25 @ 04:15]              37.2     139  |  94  |  68  ----------------------------<  87      [02-25-25 @ 04:15]  3.8   |  27  |  9.12        Ca     9.3     [02-25-25 @ 04:15]      Mg     2.80     [02-25-25 @ 04:15]      Phos  6.1     [02-25-25 @ 04:15]    TPro  x   /  Alb  x   /  TBili  x   /  DBili  x   /  AST  x   /  ALT  5   /  AlkPhos  x   [02-25-25 @ 17:43]    PT/INR: PT 11.0 , INR 0.92       [02-24-25 @ 11:20]  PTT: 28.3       [02-24-25 @ 11:20]      Creatinine Trend:  SCr 9.12 [02-25 @ 04:15]  SCr 8.44 [02-24 @ 11:20]    Urinalysis - [02-25-25 @ 04:15]      Color  / Appearance  / SG  / pH       Gluc 87 / Ketone   / Bili  / Urobili        Blood  / Protein  / Leuk Est  / Nitrite       RBC  / WBC  / Hyaline  / Gran  / Sq Epi  / Non Sq Epi  / Bacteria

## 2025-02-26 NOTE — CONSULT NOTE ADULT - SUBJECTIVE AND OBJECTIVE BOX
[facility]   MD MIKE HOFFMAN MD ANGELA WONG, PA        TEL:  OFFICE: [phone]  From 5pm-7am answering service [phone]    --- INITIAL RENAL CONSULT NOTE ---date of service 02-25-25 @ 14:52    HPI:  79-year-old male with past medical history of CAD status post 1 stent (4-5 years ago), hyperlipidemia, diabetes, hypertension, A-fib no longer on anticoagulation reports only aspirin, CVA with no residual deficits, ESRD on HD TTS who presents for multiple presyncopal episode. First episode on 2/20, piror to dialysis. second episode was during, and thrid episode on 2/22 was after dialysis. Pt describe this as having a "weird whole body rush" went thru him. He fell forward during the first near syncopal episode, hitting his left sided head, able to get up with assistance afterwards. No LOC. Pt also endorse frequent orthostatic hypotension with positional changes. No CP, SOB, N/V at time of incident. No passing out. ROS otherwise negative. No dizziness at time of interview.   pt follows nephrologist Dr. Dobbs    Allergies:  No Known Allergies      PAST MEDICAL & SURGICAL HISTORY:  DM (diabetes mellitus)      Gout      HTN (hypertension)      BPH (benign prostatic hyperplasia)      Chronic kidney disease (CKD)      CVA (cerebrovascular accident)      Afib      H/O eye surgery          Home Medications Reviewed    Hospital Medications:   MEDICATIONS  (STANDING):  aspirin enteric coated 81 milliGRAM(s) Oral daily  calcium acetate 667 milliGRAM(s) Oral three times a day with meals  chlorhexidine 2% Cloths 1 Application(s) Topical daily  cloNIDine 0.2 milliGRAM(s) Oral two times a day  dextrose 5%. 1000 milliLiter(s) (50 mL/Hr) IV Continuous <Continuous>  dextrose 5%. 1000 milliLiter(s) (100 mL/Hr) IV Continuous <Continuous>  dextrose 50% Injectable 25 Gram(s) IV Push once  dextrose 50% Injectable 12.5 Gram(s) IV Push once  dextrose 50% Injectable 25 Gram(s) IV Push once  glucagon  Injectable 1 milliGRAM(s) IntraMuscular once  heparin   Injectable 5000 Unit(s) SubCutaneous every 8 hours  influenza  Vaccine (HIGH DOSE) 0.5 milliLiter(s) IntraMuscular once  insulin lispro (ADMELOG) corrective regimen sliding scale   SubCutaneous three times a day before meals  insulin lispro (ADMELOG) corrective regimen sliding scale   SubCutaneous at bedtime  NIFEdipine XL 90 milliGRAM(s) Oral daily  tamsulosin 0.4 milliGRAM(s) Oral at bedtime  traZODone 100 milliGRAM(s) Oral at bedtime      SOCIAL HISTORY:  Denies ETOh, Smoking,     FAMILY HISTORY:  Family history of diabetes mellitus (Mother)    Family history of hypertension (Mother)        REVIEW OF SYSTEMS:  CONSTITUTIONAL: No weakness, fevers or chills  EYES/ENT: No visual changes;  No vertigo or throat pain   NECK: No pain or stiffness  RESPIRATORY: No cough, wheezing, hemoptysis; No shortness of breath  CARDIOVASCULAR: No chest pain or palpitations.  GASTROINTESTINAL: No abdominal or epigastric pain. No nausea, vomiting, or hematemesis; No diarrhea or constipation. No melena or hematochezia.  GENITOURINARY: No dysuria, frequency, foamy urine, urinary urgency, incontinence or hematuria  NEUROLOGICAL: see hpi  SKIN: No itching, burning, rashes, or lesions   VASCULAR: No bilateral lower extremity edema.   All other review of systems is negative unless indicated above.    VITALS:  T(F): 97.4 (02-25-25 @ 12:15), Max: 97.9 (02-24-25 @ 21:07)  HR: 70 (02-25-25 @ 12:15)  BP: 124/55 (02-25-25 @ 12:15)  RR: 16 (02-25-25 @ 12:15)  SpO2: 100% (02-25-25 @ 12:15)  Wt(kg): --        PHYSICAL EXAM:  General: NAD  HEENT: anicteric sclera, oropharynx clear, MMM  Neck: No JVD  Respiratory: CTAB, no wheezes, rales or rhonchi  Cardiovascular: S1, S2, RRR  Gastrointestinal: BS+, soft, NT/ND  Extremities: No cyanosis or clubbing. No peripheral edema  Neurological: A/O x 3, no focal deficits  Psychiatric: Normal mood, normal affect  : No CVA tenderness. No mane.   Skin: No rashes  Vascular Access: avf    LABS:  02-25    139  |  94[L]  |  68[H]  ----------------------------<  87  3.8   |  27  |  9.12[H]    Ca    9.3      25 Feb 2025 04:15  Phos  6.1     02-25  Mg     2.80     02-25    TPro  7.2  /  Alb  3.8  /  TBili  0.3  /  DBili      /  AST  6   /  ALT  6   /  AlkPhos  53  02-25    Creatinine Trend: 9.12 <--, 8.44 <--                        12.1   5.98  )-----------( 179      ( 25 Feb 2025 04:15 )             37.2     Urine Studies:  Urinalysis Basic - ( 25 Feb 2025 04:15 )    Color:  / Appearance:  / SG:  / pH:   Gluc: 87 mg/dL / Ketone:   / Bili:  / Urobili:    Blood:  / Protein:  / Nitrite:    Leuk Esterase:  / RBC:  / WBC    Sq Epi:  / Non Sq Epi:  / Bacteria:           RADIOLOGY & ADDITIONAL STUDIES:                
Source: patient and Chart    Patient is a 79y old Male who presents with a chief complaint of Near syncope (2025 09:42)    HPI:  This is an 79 year old male with PMHx CAD s/p PCI (1 stent 4-5 years ago), HTN, HLD, DM2, ESRD on HD and CVA presenting for evaluation of near syncope. Patient reports that he has been experiencing episodes of near syncope for at least six months. He notes that he feels a "rush" through his body, with associated lightheadedness. These episodes have typically been associated with showering or standing from a seated position. He denies falls or LOC during these episodes. Over the last 3 months, he reports 3 similar episodes associated with a fall, without LOC. The first episode was about 3 months ago. The second episode was on  when he was on his way to HD and he hit his head. He did not seek medical attention and denied LOC. His most recent episode and reason for admission occurred following HD. He reports occasional associated palpitations. He denies chest pain, dyspnea, abd pain, n/v, LE edema, orthopnea. He is compliant with medications and with HD. He denies a history of atrial fibrillation or ever taking AC other than ASA and plavix. He denies prior hx of seeing electrophysiology or evaluation for arrhythmias. He follows with Dr Ruben Nazario of [facility].    EP is consulted for telemetry findings of intermittent wide-complex QRS.    Cardiac monitoring reviewed with SB/SR 50-80s, with first degree AVB and intermittent IVCD. Occasional PVCs. No pauses or tachyarrhythmias noted.  ECG with SR, 72bpm, first degree AVB, LAD, nonspecific IVCD with QRS 128ms, TWI anterolaterally similar to prior ECG    PAST MEDICAL & SURGICAL HISTORY:  DM (diabetes mellitus)  Gout  HTN (hypertension)  BPH (benign prostatic hyperplasia)  Chronic kidney disease (CKD)  CVA (cerebrovascular accident)  Afib  H/O eye surgery        MEDICATIONS  (STANDING):  aspirin enteric coated 81 milliGRAM(s) Oral daily  calcium acetate 667 milliGRAM(s) Oral three times a day with meals  chlorhexidine 2% Cloths 1 Application(s) Topical daily  cloNIDine 0.2 milliGRAM(s) Oral two times a day  dextrose 5%. 1000 milliLiter(s) (50 mL/Hr) IV Continuous <Continuous>  dextrose 5%. 1000 milliLiter(s) (100 mL/Hr) IV Continuous <Continuous>  dextrose 50% Injectable 25 Gram(s) IV Push once  dextrose 50% Injectable 12.5 Gram(s) IV Push once  dextrose 50% Injectable 25 Gram(s) IV Push once  glucagon  Injectable 1 milliGRAM(s) IntraMuscular once  heparin   Injectable 5000 Unit(s) SubCutaneous every 8 hours  influenza  Vaccine (HIGH DOSE) 0.5 milliLiter(s) IntraMuscular once  insulin lispro (ADMELOG) corrective regimen sliding scale   SubCutaneous three times a day before meals  insulin lispro (ADMELOG) corrective regimen sliding scale   SubCutaneous at bedtime  NIFEdipine XL 90 milliGRAM(s) Oral daily  tamsulosin 0.4 milliGRAM(s) Oral at bedtime  traZODone 100 milliGRAM(s) Oral at bedtime    MEDICATIONS  (PRN):  dextrose Oral Gel 15 Gram(s) Oral once PRN Blood Glucose LESS THAN 70 milliGRAM(s)/deciliter  sodium chloride 0.9% Bolus. 100 milliLiter(s) IV Bolus every 5 minutes PRN SBP LESS THAN or EQUAL to 80 mmHg      FAMILY HISTORY:  Family history of diabetes mellitus (Mother)    Family history of hypertension (Mother)        SOCIAL HISTORY:    LIVING SITUATION:  CIGARETTES: Denieds  ALCOHOL: denies  ILLICIT DRUG USES: denies    REVIEW OF SYSTEMS:  CONSTITUTIONAL: No fever, weight loss, chills, shakes, or fatigue  EYES: No eye pain, visual disturbances, or discharge  ENMT:  No difficulty hearing, tinnitus, vertigo; No sinus or throat pain  NECK: No pain or stiffness  RESPIRATORY: No cough, wheezing, hemoptysis, or shortness of breath  CARDIOVASCULAR: No chest pain, dyspnea, dizziness, paroxysmal nocturnal dyspnea, orthopnea, or arm or leg swelling (+) near syncope, palpitations  GASTROINTESTINAL: No abdominal  or epigastric pain, nausea, vomiting, hematemesis, diarrhea, constipation, melena or bright red blood.  GENITOURINARY: No dysuria, nocturia, hematuria, or urinary incontinence  NEUROLOGICAL: No headaches, memory loss, slurred speech, limb weakness, loss of strength, numbness, or tremors (+) falls, near-syncope, lightheadedness  MUSCULOSKELETAL: No joint pain or swelling, muscle, back, or extremity pain  PSYCHIATRIC: No depression, anxiety, or difficulty sleeping        Vital Signs Last 24 Hrs  T(C): 36.7 (2025 05:10), Max: 36.9 (2025 17:15)  T(F): 98.1 (2025 05:10), Max: 98.4 (2025 17:15)  HR: 80 (2025 05:10) (67 - 80)  BP: 126/81 (2025 05:10) (124/55 - 151/75)  BP(mean): --  RR: 17 (2025 05:10) (16 - 18)  SpO2: 99% (2025 05:10) (99% - 100%)    Parameters below as of 2025 05:10  Patient On (Oxygen Delivery Method): room air        PHYSICAL EXAM:  GENERAL: Well appearing, speaking in full sentence, in NAD  HEAD:  Atraumatic, Normocephalic  EYES: EOMI, PERRLA, conjunctiva and sclera clear  ENMT: No tonsillar erythema, exudates, or enlargement; Moist mucous membranes, Good dentition, No lesions  NECK: Supple and normal thyroid.  No JVD or carotid bruit.  Carotid pulse is 2+ bilaterally.  HEART: S1S2 RRR; No murmurs, rubs, or gallops appreciated .  PULMONARY:CTABL, normal respiratory effort.  No rales, wheezing, or rhonchi appreciated bilaterally  ABDOMEN: Bowel sounds present, soft, NDNT  EXTREMITIES:  Warm, well -perfused, no pedal edema, distal pulses present  NEUROLOGICAL:AOx3 ,  motor function grossly  intact    I&O's Detail    2025 07:01  -  2025 07:00  --------------------------------------------------------  IN:    Other (mL): 400 mL  Total IN: 400 mL    OUT:    Other (mL): 400 mL  Total OUT: 400 mL    Total NET: 0 mL          LABS:                        12.1   5.98  )-----------( 179      ( 2025 04:15 )             37.2     02-25    139  |  94[L]  |  68[H]  ----------------------------<  87  3.8   |  27  |  9.12[H]    Ca    9.3      2025 04:15  Phos  6.1       Mg     2.80         TPro  x   /  Alb  x   /  TBili  x   /  DBili  x   /  AST  x   /  ALT  5   /  AlkPhos  x             Urinalysis Basic - ( 2025 04:15 )    Color: x / Appearance: x / SG: x / pH: x  Gluc: 87 mg/dL / Ketone: x  / Bili: x / Urobili: x   Blood: x / Protein: x / Nitrite: x   Leuk Esterase: x / RBC: x / WBC x   Sq Epi: x / Non Sq Epi: x / Bacteria: x      BNP  I&O's Detail    2025 07:01  -  2025 07:00  --------------------------------------------------------  IN:    Other (mL): 400 mL  Total IN: 400 mL    OUT:    Other (mL): 400 mL  Total OUT: 400 mL    Total NET: 0 mL        Daily     Daily Weight in k (2025 20:25)    RADIOLOGY & ADDITIONAL STUDIES:    TTE 2025  1. Left ventricular systolic function is normal with an ejection fraction of 71 % by Dozier's method of disks.   2. Septal hypertrophy.   3. Normal right ventricular cavity size, with normal wall thickness, and normal right ventricular systolic function.   4. Left atrium is normal in size.   5. The aortic valve appears trileaflet with reduced systolic excursion. There is calcification of the aortic valve leaflets. There is mild aortic stenosis. The peak transaortic velocity is 1.86 m/s, peak transaortic gradient is 13.8 mmHg and mean transaortic gradient is 6.4 mmHg with an LVOT/aortic valve VTI ratio of 0.49. The aortic valve acceleration time is 63 msec. The aortic valve area is estimated at 1.69 cm² by the continuity equation.   6. No pericardial effusion seen.   7. Pulmonary artery systolic pressure could not be estimated.   8. The inferior vena cava is normal in size (normal <2.1cm) with normal inspiratory collapse (normal >50%) consistent with normal right atrial pressure (~3, range 0-5mmHg).

## 2025-02-26 NOTE — DISCHARGE NOTE NURSING/CASE MANAGEMENT/SOCIAL WORK - NSDCCRNAME_GEN_ALL_CORE_FT
[facility] ([address] [facility] [address]) Next Scheduled HD: Thursday February 27th, 2025 at 10a.m.

## 2025-02-26 NOTE — DISCHARGE NOTE PROVIDER - NSDCCPTREATMENT_GEN_ALL_CORE_FT
PRINCIPAL PROCEDURE  Procedure: Echo 2D  Findings and Treatment: CONCLUSIONS:      1. Left ventricular systolic function is normal with an ejection fraction of 71 % by Dozier's method of disks.   2. Septal hypertrophy.   3. Normal right ventricular cavity size, with normal wall thickness, and normal right ventricular systolic function.   4. Left atrium is normal in size.   5. The aortic valve appears trileaflet with reduced systolic excursion. There is calcification of the aortic valve leaflets. There is mild aortic stenosis. The peak transaortic velocity is 1.86 m/s, peak transaortic gradient is 13.8 mmHg and mean transaortic gradient is 6.4 mmHg with an LVOT/aortic valve VTI ratio of 0.49. The aortic valve acceleration time is 63 msec. The aortic valve area is estimated at 1.69 cm²by the continuity equation.   6. No pericardial effusion seen.   7. Pulmonary artery systolic pressure could not be estimated.   8. The inferior vena cava is normal in size (normal <2.1cm) with normal inspiratory collapse (normal >50%) consistent with normal right atrial pressure (~3, range 0-5mmHg).      SECONDARY PROCEDURE  Procedure: CT head  Findings and Treatment: FINDINGS:  VENTRICLES AND SULCI: Mild ex vacuo dilatation appearance of the   bilateral ventricles.  INTRA-AXIAL: No mass effect, acute hemorrhage, or midline shift.  There   are periventricular and subcortical white matter hypodensities,   consistent with microvascular type changes. Small chronic appearing   infarct in the right cerebellar hemisphere.  EXTRA-AXIAL: No mass or fluid collection. Basal cisterns are normal in   appearance.  VISUALIZED SINUSES:  Scattered mucosal thickening. Small mucous   retentions versus polyp in the right maxillary sinus.  TYMPANOMASTOID CAVITIES:  Clear.  VISUALIZED ORBITS: Bilateral lens replacement.  CALVARIUM: Hypodensity in the high right frontal bone, stable when   compared to prior imaging. No depressed calvarial fracture.  MISCELLANEOUS: None.  IMPRESSION:  1.  No acute intracranial hemorrhage, mass effect, or midline shift.  2.  Chronic ischemic changes as discussed above.

## 2025-02-26 NOTE — DISCHARGE NOTE NURSING/CASE MANAGEMENT/SOCIAL WORK - FINANCIAL ASSISTANCE
[facility] provides services at a reduced cost to those who are determined to be eligible through [facility]’s financial assistance program. Information regarding [facility]’s financial assistance program can be found by going to [email or web address] or by calling [phone].

## 2025-02-26 NOTE — PROGRESS NOTE ADULT - ASSESSMENT
79-year-old male with past medical history of CAD status post 1 stent (4-5 years ago), hyperlipidemia, diabetes, hypertension, A-fib no longer on anticoagulation reports only aspirin, CVA with no residual deficits, ESRD on HD TTS who presents for multiple presyncopal episode.    ESRD on HD TTS  via AVF  nephrologist Dr. Dobbs  outpatient unit: [facility]  s/p HD On 2/25 with no uf   orthostatic + hd without UF   monitor  renal diet    htn  controlled  orthostatic +  can give 300 NS for orthostatic  Consider Midodrine  monitor    anemia  at goal  monitor    ckd-mbd  check pth  elevated phos continue binder  monitor calcium and phos daily

## 2025-02-26 NOTE — PROGRESS NOTE ADULT - PROBLEM SELECTOR PLAN 7
Diet: regular, carb consistent   DVT ppx: heparin 5000U Diet: regular, carb consistent   DVT ppx: heparin 5000U  Dispo: Dc today

## 2025-02-26 NOTE — DISCHARGE NOTE PROVIDER - CARE PROVIDER_API CALL
Ray Dobbs  Nephrology  [address] [place] [address]  Phone: [phone]  Fax: [phone]  Established Patient  Follow Up Time: 1 week    FOUZIA LEYVA  [address] [address] [address]  Phone: ()-  Fax: ()-  Established Patient  Follow Up Time: 1 week   Ray Dobbs  Nephrology  [address] [place] [address]  Phone: [phone]  Fax: [phone]  Established Patient  Follow Up Time: 1 week    FOUZIA LEYVA  [address] [facility] [address]  [place] [address]  Phone: ()-  Fax: ()-  Established Patient  Follow Up Time: 1 week    Liana Cardenas  Internal Medicine    Lucinda CRUZ,    Phone: ()-  Fax: ()-  Established Patient  Follow Up Time: 1 week

## 2025-02-26 NOTE — DISCHARGE NOTE PROVIDER - PROVIDER TOKENS
PROVIDER:[TOKEN:[[id number]],FOLLOWUP:[1 week],ESTABLISHEDPATIENT:[T]],PROVIDER:[TOKEN:[[id number]],FOLLOWUP:[1 week],ESTABLISHEDPATIENT:[T]] PROVIDER:[TOKEN:[[id number]],FOLLOWUP:[1 week],ESTABLISHEDPATIENT:[T]],PROVIDER:[TOKEN:[[id number]],FOLLOWUP:[1 week],ESTABLISHEDPATIENT:[T]],PROVIDER:[TOKEN:[[id number]],FOLLOWUP:[1 week],ESTABLISHEDPATIENT:[T]]

## 2025-02-26 NOTE — DISCHARGE NOTE NURSING/CASE MANAGEMENT/SOCIAL WORK - NSDCPEFALRISK_GEN_ALL_CORE
For information on Fall & Injury Prevention, visit: [email or web address] OR  [email or web address] OR  https://www.cdc.gov/steadi/patient.html

## 2025-02-26 NOTE — PROVIDER CONTACT NOTE (OTHER) - ASSESSMENT
Patient refused fingerstick. No distress, last one done at 1927, fs was 105. patient refused to have one done.
Patient doesn't want bed alarms on
Patient refused heparin and AM labs
Patient's ortho BP as documented

## 2025-02-26 NOTE — PROGRESS NOTE ADULT - ASSESSMENT
79-year-old male with past medical history of CAD status post 1 stent (4-5 years ago), hyperlipidemia, diabetes, hypertension, A-fib no longer on anticoagulation reports only aspirin, CVA with no residual deficits, CKD who presents for multiple presyncopal episode 79-year-old male with past medical history of CAD status post 1 stent (4-5 years ago), hyperlipidemia, diabetes, hypertension, A-fib no longer on anticoagulation reports only aspirin, CVA with no residual deficits, CKD who presents for multiple presyncopal episode. Patient with septal hypertrophy on echo but no noted LVOT obstruction, will check with cards. New intermittent widening of QRS, c/f fascicular block, will touch base with EP before d/c later today

## 2025-02-26 NOTE — PROVIDER CONTACT NOTE (OTHER) - SITUATION
Patient refused heparin and AM labs
Patient doesn't want bed alarms on
Patient refused fingerstick
Patient's ortho BP as documented

## 2025-02-26 NOTE — DISCHARGE NOTE PROVIDER - REASON FOR ADMISSION
Oncology Consultation                                                                  Patient Name: Ella Rawls    YOB: 1961    MRN: [id number]         Date of Service: 3/29/2023    Subjective     Source: patient and EMR     Preferred Language: english    PCP: Raj Travis MD    Chief Complaint:   Chief Complaint   Patient presents with   • Weakness     HPI:  Ella Rawls is a 62 year old male with past medical history of hypothyroidism, hypertension, and diagnosis of clear-cell renal cell carcinoma in 2019 status post nephrectomy on Keytruda and axitinib.  Patient presenting to [facility] with a weeklong history of nausea vomiting abdominal pain.  Patient noted to be in diabetic ketoacidosis.  Patient's sequentially started on insulin, given fluids, and admitted to the medical intensive care unit.  Patient anion gap now closed and patient no longer in diabetic ketoacidosis.    Patient originally diagnosed with ccRCC 2019 and sequentially underwent a radial nephrectomy on 8/6/2019.  Patient was staged pT3a Nx.  Patient underwent surveillance, however and November 2020 on CT abdomen pelvis patient noted to have lung nodule.  Biopsy revealed that patient now has metastatic ccRCC.  Patient was placed on immunotherapy and targeted therapy.  Patient started on Keytruda and axitinib 200 mg every 2 weeks and 5 mg twice daily, respectively.  Patient has had mild of complications such as hypertension and rashes and treated with change in medication for blood pressure management as well as topical steroids, respectively. Patient has been on this therapy just over two years. Subsequent imaging has shown stable disease with no progression on this admission.  Patient previously has been tolerating treatment well and has no significant adverse effects.    At this time patient feels well and evaluated by intensivist team as well as endocronology.  The patient is awaiting lab results for  autoimmune diabetes mellitus. Patient new onset insulin dependent diabetes mellitus and DKA likely due to immunotherapy. Immunotherapy and targeted therapy has been held at this time.    3/29/2023: Sugars appear better controlled.  Transferred to floor.  Patient has been told that he will be discharged when his blood sugars are under 200.        Review of Systems:   Constitutional: Denies fever, chills, sweats   HEENT: Denies recent vision changes  , sore throat   Cardiovascular: Denies CP, palpitations, syncope   Respiratory: Denies SOB, wheezing, cough, hemoptysis   GI: Resolved nausea / vomiting / abdominal pain,   : Denies dysuria, hematuria   MSK: Denies muscle pain   Skin: Denies rash, pruritus   Heme/lymph: Denies LAD   Neuro: Denies numbness, tingling, weakness, headache     Past Medical History:   Diagnosis Date   • Allergic rhinitis    • Carotid stenosis    • Chronic back pain    • Essential (primary) hypertension    • GERD (gastroesophageal reflux disease)    • History of blood transfusion 9/20/2020   • Hypogonadotropic hypogonadism (CMD)    • Personal history of colonic polyps 11/5/2013    Janie, Jocelyne:severe   • Pre-diabetes    • Psoriasis    • Stenosis of intracranial vessel    • Stroke (CMD) 03/2019    scattered right MCA territory      Medications Prior to Admission   Medication Sig Dispense Refill   • ketoconazole (NIZORAL) 2 % cream Apply topically 2 times daily as needed (rash). 30 g 3   • potassium CHLORIDE (KLOR-CON M) 20 MEQ philipp ER tablet Take 2 tablets by mouth daily. 60 tablet 3   • triamcinolone (ARISTOCORT) 0.1 % cream Apply topically 2 times daily. 60 g 1   • Axitinib (INLYTA) 5 MG Tab Take 5 mg by mouth 2 times daily. 60 tablet 0   • NIFEdipine CC (ADALAT CC) 90 MG 24 hr tablet Take 1 tablet by mouth daily. 90 tablet 0   • hydroCHLOROthiazide (HYDRODIURIL) 25 MG tablet Take 1 tablet by mouth daily. 90 tablet 0   • atorvastatin (LIPITOR) 40 MG tablet Take 1 tablet by mouth daily.  90 tablet 0   • levothyroxine 50 MCG tablet TAKE 1 TABLET BY MOUTH EVERY DAY 90 tablet 1   • clotrimazole-betamethasone (LOTRISONE) 1-0.05 % cream Apply sparingly to affected area, 2 times daily as needed rash 60 g 1   • lisinopril (ZESTRIL) 40 MG tablet Take 1 tablet by mouth daily. 90 tablet 1   • metoPROLOL succinate (TOPROL-XL) 100 MG 24 hr tablet TAKE 1 TABLET BY MOUTH EVERY DAY 90 tablet 3   • omeprazole (PrilOSEC) 20 MG capsule Take 20 mg by mouth daily.     • aspirin 81 MG tablet Take 81 mg by mouth daily.       ALLERGIES:  No Known Allergies   Past Surgical History:   Procedure Laterality Date   • Carotid stent Right 03/20/2019    for symptomatic moderate SHAQ stenosis    • Hb ablation varicose vein laser 1st Left 2018   • Lipoma resection N/A 2015    Neck. Dr. Rodriguez   • Nephrectomy Right 08/2019    Dr. Wray   • Thyroid biopsy  2016   • Varicose vein surgery Bilateral 2018    Dr. Hatfield     Family History   Problem Relation Age of Onset   • Stroke Brother    • Cancer, Colon Father    • Urolithiasis Mother      Social History     Tobacco Use   • Smoking status: Former     Packs/day: 0.00     Types: Cigarettes   • Smokeless tobacco: Never   Vaping Use   • Vaping Use: never used   Substance Use Topics   • Alcohol use: No   • Drug use: No       Objective     Vitals:    03/29/23 0758   BP: (!) 156/110   Pulse: 79   Resp:    Temp:        Physical Exam  General: AOx4, NAD, appears stated age, resting in bed, obese   HEENT: normocephalic, atraumatic, EOMI, PERRL  Neck: supple, nontender, no LAD   CV: RRR, +S1 +S2, no murmurs.  radial, and dorsalis pedis pulses +2/4 b/l. No JVD   Lungs: CTAB, no crackles  Abdomen: previously healed scar, soft, nontender, nondistended, No rigidity, rebound or guarding.   Extremities: slight peripheral edema b/l, clubbing, or cyanosis   Neuro: CN 2-12 grossly intact   Skin: warm, dry, intact    No intake or output data in the 24 hours ending 03/29/23 1056    Recent Results  (from the past 24 hour(s))   GLUCOSE, BEDSIDE - POINT OF CARE    Collection Time: 03/28/23  1:16 PM   Result Value Ref Range    GLUCOSE, BEDSIDE - POINT OF CARE 445 (H) 70 - 99 mg/dL   Basic Metabolic Panel    Collection Time: 03/28/23  4:05 PM   Result Value Ref Range    Fasting Status      Sodium 135 135 - 145 mmol/L    Potassium 3.8 3.4 - 5.1 mmol/L    Chloride 106 97 - 110 mmol/L    Carbon Dioxide 26 21 - 32 mmol/L    Anion Gap 7 7 - 19 mmol/L    Glucose 409 (H) 70 - 99 mg/dL    BUN 33 (H) 6 - 20 mg/dL    Creatinine 1.61 (H) 0.67 - 1.17 mg/dL    Glomerular Filtration Rate 48 (L) >=60    BUN/Cr 20 7 - 25    Calcium 9.3 8.4 - 10.2 mg/dL   GLUCOSE, BEDSIDE - POINT OF CARE    Collection Time: 03/28/23  6:01 PM   Result Value Ref Range    GLUCOSE, BEDSIDE - POINT OF CARE 334 (H) 70 - 99 mg/dL   GLUCOSE, BEDSIDE - POINT OF CARE    Collection Time: 03/28/23  9:38 PM   Result Value Ref Range    GLUCOSE, BEDSIDE - POINT OF CARE 273 (H) 70 - 99 mg/dL   Magnesium    Collection Time: 03/29/23  5:40 AM   Result Value Ref Range    Magnesium 2.3 1.7 - 2.4 mg/dL   Phosphorus    Collection Time: 03/29/23  5:40 AM   Result Value Ref Range    Phosphorus 3.0 2.4 - 4.7 mg/dL   Comprehensive Metabolic Panel    Collection Time: 03/29/23  5:40 AM   Result Value Ref Range    Fasting Status      Sodium 138 135 - 145 mmol/L    Potassium 3.1 (L) 3.4 - 5.1 mmol/L    Chloride 108 97 - 110 mmol/L    Carbon Dioxide 25 21 - 32 mmol/L    Anion Gap 8 7 - 19 mmol/L    Glucose 200 (H) 70 - 99 mg/dL    BUN 27 (H) 6 - 20 mg/dL    Creatinine 1.21 (H) 0.67 - 1.17 mg/dL    Glomerular Filtration Rate 67 >=60    BUN/Cr 22 7 - 25    Calcium 9.6 8.4 - 10.2 mg/dL    Bilirubin, Total 0.8 0.2 - 1.0 mg/dL    GOT/AST 18 <=37 Units/L    GPT/ALT 29 <64 Units/L    Alkaline Phosphatase 63 45 - 117 Units/L    Albumin 2.7 (L) 3.6 - 5.1 g/dL    Protein, Total 5.4 (L) 6.4 - 8.2 g/dL    Globulin 2.7 2.0 - 4.0 g/dL    A/G Ratio 1.0 1.0 - 2.4   CBC with Automated  Differential (performable only)    Collection Time: 03/29/23  5:40 AM   Result Value Ref Range    WBC 5.0 4.2 - 11.0 K/mcL    RBC 5.37 4.50 - 5.90 mil/mcL    HGB 14.9 13.0 - 17.0 g/dL    HCT 44.6 39.0 - 51.0 %    MCV 83.1 78.0 - 100.0 fl    MCH 27.7 26.0 - 34.0 pg    MCHC 33.4 32.0 - 36.5 g/dL    RDW-CV 13.8 11.0 - 15.0 %    RDW-SD 41.8 39.0 - 50.0 fL     (L) 140 - 450 K/mcL    NRBC 0 <=0 /100 WBC    Neutrophil, Percent 57 %    Lymphocytes, Percent 29 %    Mono, Percent 10 %    Eosinophils, Percent 3 %    Basophils, Percent 1 %    Immature Granulocytes 0 %    Absolute Neutrophils 2.9 1.8 - 7.7 K/mcL    Absolute Lymphocytes 1.4 1.0 - 4.0 K/mcL    Absolute Monocytes 0.5 0.3 - 0.9 K/mcL    Absolute Eosinophils  0.2 0.0 - 0.5 K/mcL    Absolute Basophils 0.1 0.0 - 0.3 K/mcL    Absolute Immature Granulocytes 0.0 0.0 - 0.2 K/mcL   GLUCOSE, BEDSIDE - POINT OF CARE    Collection Time: 03/29/23  7:59 AM   Result Value Ref Range    GLUCOSE, BEDSIDE - POINT OF CARE 203 (H) 70 - 99 mg/dL       Current Facility-Administered Medications   Medication   • potassium CHLORIDE (KLOR-CON M) philipp ER tablet 40 mEq   • lisinopril (ZESTRIL) tablet 20 mg   • insulin lispro (ADMELOG,HumaLOG) - Correction Dose   • polyethylene glycol (MIRALAX) packet 17 g   • insulin glargine (LANTUS) injection 75 Units   • sodium chloride (PF) 0.9 % injection 10 mL   • sodium chloride (PF) 0.9 % injection 10 mL   • insulin lispro (ADMELOG,HumaLOG) - Scheduled Mealtime Dose   • insulin lispro (ADMELOG,HumaLOG) - Correction Dose   • dextrose 50 % injection 25 g   • dextrose 50 % injection 12.5 g   • glucagon (GLUCAGEN) injection 1 mg   • dextrose (GLUTOSE) 40 % gel 15 g   • dextrose (GLUTOSE) 40 % gel 30 g   • sodium chloride 0.9 % flush bag 25 mL   • sodium chloride 0.9% infusion   • sodium chloride 0.9% infusion   • NIFEdipine XL (PROCARDIA XL) ER tablet 90 mg   • pantoprazole (PROTONIX) EC tablet 40 mg   • levothyroxine (SYNTHROID, LEVOTHROID) tablet  50 mcg   • atorvastatin (LIPITOR) tablet 40 mg   • aspirin (ECOTRIN) enteric coated tablet 81 mg   • labetalol (NORMODYNE) injection 20 mg   • metoPROLOL succinate (TOPROL-XL) ER tablet 100 mg   • heparin (porcine) injection 5,000 Units   • ondansetron (ZOFRAN) injection 4 mg          Assessment and Plan       #Metastatic clear-cell renal cell carcinoma  - s/p right radical nephrectomy at stage (pT3a Nx) 8/16/2019 - no adjuvant therapy  - biopsy lung nodule 1/26/21 -> (+) ccRCC --> Started on pembrolizumab and axitnib   - CT CAP: Stable disease with no progression (3/26/2022)  - s/p 2 years of immuno therapy with pembolizumab  And targeted axitnib  - Patient with good risk disease  Plan  - Discontinue pembrolizumab at this time and will reevaluate   - hold Axitinib while inpatient. Will restart on discharge   - f/u in office upon discharge.  Patient is due to be seen next week    #Diabetic ketoacidosis (resolved)  #New onset insulin-dependent diabetes mellitus  -Likely secondary to immune checkpoint inhibitor therapy.  Work-up in progress.  Insulin antibodies negative.  Await results of DAMION antibodies  -Status post fluids, insulin  - Patient currently on 20 glargine, HDSS with nightly correction  Plan  -Endocrinology consulted appreciate recommendations  -DAMION, zinc transporter, islet antibodies, insulin antibodies, C -pep ordered - f/u  - Continue Insulin therapy  -We will hold Keytruda and axitinib at this time  - Will consider steroid therapy   - f/u in office upon dc    #Hypothyroidism  - TSH wnl  - On 50mcg synthroid  - Per primary / Endo    #hx of HTN  - Per primary      Osbaldo Loyd MD   Near syncope

## 2025-02-26 NOTE — PROVIDER CONTACT NOTE (OTHER) - REASON
Patient doesn't want bed alarms on
Patient refused heparin and AM labs
Orthostatic BPs
Patient refused fingerstick

## 2025-02-26 NOTE — DISCHARGE NOTE NURSING/CASE MANAGEMENT/SOCIAL WORK - PATIENT PORTAL LINK FT
You can access the FollowMyHealth Patient Portal offered by [facility] by registering at the following website: [email or web address]. By joining "[employer]’s FollowMyHealth portal, you will also be able to view your health information using other applications (apps) compatible with our system.

## 2025-02-26 NOTE — DISCHARGE NOTE PROVIDER - NSDCCPCAREPLAN_GEN_ALL_CORE_FT
PRINCIPAL DISCHARGE DIAGNOSIS  Diagnosis: Syncope  Assessment and Plan of Treatment: Dizziness  Dizziness can manifest as a feeling of unsteadiness or light-headedness. You may feel like you are about to faint. This condition can be caused by a number of things, including medicines, dehydration, or illness. Drink enough fluid to keep your urine clear or pale yellow. Do not drink alcohol and limit your caffeine intake. Avoid quick or sudden movements.  Rise slowly from chairs and steady yourself until you feel okay. In the morning, first sit up on the side of the bed.  We have check your heart while you were in the hospital. You have no weird heart rhythm while you are in the hospital. The ultrasound of your heart doesn't show a reason for your dizziness. Please follow up with your heart doctor outside of the hospital; you might need a holter monitor to monitor your heart rhythme for a longer amount of time.   SEEK IMMEDIATE MEDICAL CARE IF YOU HAVE ANY OF THE FOLLOWING SYMPTOMS: vomiting, changes in your vision or speech, weakness in your arms or legs, trouble speaking or swallowing, chest pain, abdominal pain, shortness of breath, sweating, bleeding, headache, neck pain, or fever.      SECONDARY DISCHARGE DIAGNOSES  Diagnosis: ESRD on dialysis  Assessment and Plan of Treatment: You have orthostatic hypotension, this means your blood pressure decrease when you stand up and/or your heart rate increase when you standing up.   Please follow up with your kidney doctor to see whether it would be helpful to remove less fluid during your dialysis session.    Diagnosis: Medication management  Assessment and Plan of Treatment: Please see your primary care doctor to make sure you have the most up to date medicine list. The medicine list you have in your wallet is not the same as the one you fill at your pharmacy most recently. In particular, your lasix and your coreg medication should have ran out by now based on the last time you pick them up. Please clarified with your primary care doctor do you need to be on lasix and coreg, and get an up to date medication list from your primary care doctor.

## 2025-02-26 NOTE — DISCHARGE NOTE PROVIDER - HOSPITAL COURSE
78-year-old male with past medical history of CAD status post 1 stent, hyperlipidemia, diabetes, hypertension, A-fib no longer on anticoagulation reports only aspirin, CVA with no residual deficits, ESRD on dialysis, here for multiple near syncope episode in the past week. No LOC, no true syncope. Pt fell during first episode. Head CT at ED negative for acute pathology. Electrolytes at goal. EKG showed 1st degree block and inverted T wave in lead V4-6, similar to piror. Trop elevated however this is in setting of ESRD, delta change less than 30%. No CP no SOB. Was admitted for near syncopal work up.     Hospital course  DDX include cardiac etiology, orthostatic, fluid shift related to dialysis   Pt is orthostatic positive during hospital stay; also mention he has long history of this. Pt was placed on tele during hospital stay, no event for 48 hours. Echo showed EF 71%, septal hypertrophy however no concern for HOCM. Normal RV and LV systolic function, with mild aortic stenosis. Pt stable to be discharge, to be follow up with his own cardiologist. PT eval pt, no skill PT need.     Med changes  No med changes. 78-year-old male with past medical history of CAD status post 1 stent, hyperlipidemia, diabetes, hypertension, A-fib no longer on anticoagulation reports only aspirin, CVA with no residual deficits, ESRD on dialysis, here for multiple near syncope episode in the past week. No LOC, no true syncope. Pt fell during first episode. Head CT at ED negative for acute pathology. Electrolytes at goal. EKG showed 1st degree block and inverted T wave in lead V4-6, similar to piror. Trop elevated however this is in setting of ESRD, delta change less than 30%. No CP no SOB. Was admitted for near syncopal work up.     Hospital course  DDX include cardiac etiology, orthostatic, fluid shift related to dialysis   Pt is orthostatic positive during hospital stay; also mention he has long history of this. Echo showed EF 71%, septal hypertrophy however no concern for HOCM. Normal RV and LV systolic function, with mild aortic stenosis. On telemetry, noted to have occasional episodes of bifascicular block with intermittent IVCD during which patient was asymptomatic. Evaluated by EP and felt unlikely to be cause of symptoms. Recommended follow-up with primary cardiologist for holter or ILR as outpatient. Pt stable to be discharge, to be follow up with his own cardiologist. PT eval pt, no skill PT need.     Med changes  No med changes.

## 2025-02-26 NOTE — DISCHARGE NOTE PROVIDER - CARE PROVIDERS DIRECT ADDRESSES
,DirectAddress_Unknown,[email or web address] ,[email or web address]_[email or web address]

## 2025-02-26 NOTE — DISCHARGE NOTE PROVIDER - NS AS DC PROVIDER CONTACT Y/N MULTI
How Severe Are Your Spot(S)?: mild What Type Of Note Output Would You Prefer (Optional)?: Bullet Format What Is The Reason For Today's Visit?: Full Body Skin Examination with No Concerns Yes

## 2025-02-26 NOTE — PROGRESS NOTE ADULT - ATTENDING COMMENTS
Mr. Ogden is an 79-year-old male w/ PMH of CAD s/p stenting, atrial fibrillation (not on A/C), prior CVA, and ESRD on HD (T/Th/Sa) who presents with 2 recent presyncopal episodes associated with lightheadedness. Admitted to telemetry for syncope work-up.    No overnight events. He denies any further recurrence of these symptoms and feels well. Denies any CP, SOB, lightheadedness.     -Initial cardiac workup shows T-wave inversions in V4-V6 on EKG, though unchanged from prior EKG. Also notable for elevated troponin consistent however this may be related to his ESRD.  - Check TTE done today notes nl EF, mild AS, and septal hypertrophy  - in pt w/ pre-syncopal symptoms and septal hypertrophy, c/f LVOT obstruction / ?HOCM causing symptoms. Will d/w patient's cardiologist. Pt already on CCB.   - PT evaluated and no needs  - Electrolyte monitoring  - underwent HD today  - d/c planning
Mr. Ogden is an 79-year-old male w/ PMH of CAD s/p stenting, atrial fibrillation (not on A/C), prior CVA, and ESRD on HD (T/Th/Sa) who presents with 2 recent presyncopal episodes associated with lightheadedness. Admitted to telemetry for syncope work-up.    Seen by me this morning. Says he feels well. No recurrence of presenting symptoms. No chest pain, SOB, fever, chills. Tele monitor reviewed and noted to have intermittent wide-complex QRS over night while patient was sleeping, but no events or symptoms reported overnight.     - EP consulted - bifascicular block with intermittent IVCD; recommended may benefit from outpatient holter vs. ILR - f/u w/ outpt cardiologist  - TTE noted nl EF, mild AS, and septal hypertrophy however no echo reports of LVOT obstruction  - orthostatic - advised to increase fluid intake  - PT evaluated and no needs  - Electrolyte monitoring  - maintenance HD as outpt  - d/c home today w/ outpt f/u with cardiologist and nephrology  - d/w patient at bedside  time spent ~ 38 mins

## 2025-02-26 NOTE — PROVIDER CONTACT NOTE (OTHER) - ACTION/TREATMENT ORDERED:
Provider notified
Provider notified, stated that it was okay and try to redirect
Provider notified, stated that it was okay and try to redirect
Provider notified, stated that it was okay and to try again when it is due

## 2025-02-26 NOTE — PROGRESS NOTE ADULT - PROBLEM SELECTOR PLAN 3
Home med: nifedipine ER 90 mg QD, clonidine 0.2 mg PO BID  Cont Home med: nifedipine ER 90 mg QD, clonidine 0.2 mg PO BID  Cont home meds as above

## 2025-02-26 NOTE — CONSULT NOTE ADULT - ASSESSMENT
This is an 79 year old male with PMHx CAD s/p PCI (1 stent 4-5 years ago), HTN, HLD, DM2, ESRD on HD and CVA presenting for evaluation of near syncope. EP is consulted for telemetry findings of intermittent wide-complex QRS.    # Near-syncope  # Bifascicular block with intermittent IVCD    Cardiac monitoring reviewed with SB/SR 50-80s, with first degree AVB and intermittent IVCD. Occasional PVCs. No pauses or tachyarrhythmias noted.  ECG with SR, 72bpm, first degree AVB, LAD, nonspecific IVCD with QRS 128ms, TWI anterolaterally similar to prior ECG    Noted orthostatic vital signs obtained on 2/25 - Lying 143/67 -> Standing 128/51  Patient describes symptoms associated with positional change, now resolved    Given echo findings of hyperdynamic LVEF >70%, agree with IVF and encouraging PO hydration  Repeat orthostatic vital signs  Symptoms do not correlate to presence of IVCD, thus less likely to be the culprit of his symptoms  Can consider outpatient electrocardiographic monitoring with Holter vs ILR in the setting of near syncope, intermittent palpitations, as well as CVA without prior assessment for AFib   Would consider stopping tamsulosin in favor of finasteride to limit orthostatic hypotension/orthostatic symptoms  Would additionally consider changing clonidine to alternate anti-hypertensive for BP control to limit orthostatic hypotension  
79-year-old male with past medical history of CAD status post 1 stent (4-5 years ago), hyperlipidemia, diabetes, hypertension, A-fib no longer on anticoagulation reports only aspirin, CVA with no residual deficits, ESRD on HD TTS who presents for multiple presyncopal episode.    ESRD on HD TTS  via AVF  nephrologist Dr. Dobbs  outpatient unit: [facility]  due for hd today  orthostatic + hd without UF today  monitor  renal diet    htn  controlled  orthostatic +  can give 300 NS for orthostatic  monitor    anemia  at goal  monitor    ckd-mbd  check pth  elevated phos continue binder  monitor calcium and phos daily

## 2025-02-26 NOTE — DISCHARGE NOTE PROVIDER - NSDCFUADDAPPT_GEN_ALL_CORE_FT
APPTS ARE READY TO BE MADE: [x] YES    Best Family or Patient Contact (if needed):     Additional information about above appointments (if needed):     1: card in one week for near syncope  2: neph in 1-2 weeks  3: PCP as needed    Other comments or requests:   APPTS ARE READY TO BE MADE: [x] YES    Best Family or Patient Contact (if needed):     Additional information about above appointments (if needed):     1: card in one week for near syncope  2: neph in 1-2 weeks  3: PCP in 1 week for medication management and post d/c follow up    Other comments or requests:   APPTS ARE READY TO BE MADE: [x] YES    Best Family or Patient Contact (if needed):     Additional information about above appointments (if needed):     1: card in one week for near syncope  2: neph in 1-2 weeks  3: PCP in 1 week for medication management and post d/c follow up    Other comments or requests:    Patient advised they did not want to proceed with scheduling appointments with the providers on their referrals. They will coordinate care on their own.

## 2025-02-26 NOTE — DISCHARGE NOTE PROVIDER - NSDCMRMEDTOKEN_GEN_ALL_CORE_FT
aspirin 81 mg oral tablet, chewable: 1 tab(s) orally once a day  calcium acetate: 667 milligram(s) by gastrostomy tube 3 times a day (with meals)  cloNIDine 0.2 mg oral tablet: 1 tab(s) orally 2 times a day  Farxiga 5 mg oral tablet: 1 tab(s) orally once a day  NIFEdipine 90 mg oral tablet, extended release: 1 tab(s) orally once a day  Ozempic 2 mg/1.5 mL (1 mg dose) subcutaneous solution: 1 milligram(s) subcutaneous once a week  tamsulosin 0.4 mg oral capsule: 1 cap(s) orally once a day (at bedtime)  traZODone 100 mg oral tablet: 1 tab(s) orally once a day (at bedtime)

## 2025-02-26 NOTE — PROVIDER CONTACT NOTE (OTHER) - BACKGROUND
Patient is a 78 yr M with past medical history of CAD status post 1 stent, hyperlipidemia, diabetes, hypertension, unsteady on his feet.

## 2025-02-26 NOTE — PROGRESS NOTE ADULT - SUBJECTIVE AND OBJECTIVE BOX
Patient is a 79y old  Male who presents with a chief complaint of Near syncope (25 Feb 2025 17:21)      SUBJECTIVE / OVERNIGHT EVENTS:    MEDICATIONS  (STANDING):  aspirin enteric coated 81 milliGRAM(s) Oral daily  calcium acetate 667 milliGRAM(s) Oral three times a day with meals  chlorhexidine 2% Cloths 1 Application(s) Topical daily  cloNIDine 0.2 milliGRAM(s) Oral two times a day  dextrose 5%. 1000 milliLiter(s) (50 mL/Hr) IV Continuous <Continuous>  dextrose 5%. 1000 milliLiter(s) (100 mL/Hr) IV Continuous <Continuous>  dextrose 50% Injectable 25 Gram(s) IV Push once  dextrose 50% Injectable 12.5 Gram(s) IV Push once  dextrose 50% Injectable 25 Gram(s) IV Push once  glucagon  Injectable 1 milliGRAM(s) IntraMuscular once  heparin   Injectable 5000 Unit(s) SubCutaneous every 8 hours  influenza  Vaccine (HIGH DOSE) 0.5 milliLiter(s) IntraMuscular once  insulin lispro (ADMELOG) corrective regimen sliding scale   SubCutaneous three times a day before meals  insulin lispro (ADMELOG) corrective regimen sliding scale   SubCutaneous at bedtime  NIFEdipine XL 90 milliGRAM(s) Oral daily  tamsulosin 0.4 milliGRAM(s) Oral at bedtime  traZODone 100 milliGRAM(s) Oral at bedtime    MEDICATIONS  (PRN):  dextrose Oral Gel 15 Gram(s) Oral once PRN Blood Glucose LESS THAN 70 milliGRAM(s)/deciliter  sodium chloride 0.9% Bolus. 100 milliLiter(s) IV Bolus every 5 minutes PRN SBP LESS THAN or EQUAL to 80 mmHg      CAPILLARY BLOOD GLUCOSE      POCT Blood Glucose.: 105 mg/dL (25 Feb 2025 19:27)  POCT Blood Glucose.: 147 mg/dL (25 Feb 2025 15:20)  POCT Blood Glucose.: 114 mg/dL (25 Feb 2025 12:15)  POCT Blood Glucose.: 122 mg/dL (25 Feb 2025 08:28)    I&O's Summary    25 Feb 2025 07:01  -  26 Feb 2025 07:00  --------------------------------------------------------  IN: 400 mL / OUT: 400 mL / NET: 0 mL        Vital Signs Last 24 Hrs  T(C): 36.7 (26 Feb 2025 05:10), Max: 36.9 (25 Feb 2025 17:15)  T(F): 98.1 (26 Feb 2025 05:10), Max: 98.4 (25 Feb 2025 17:15)  HR: 80 (26 Feb 2025 05:10) (67 - 80)  BP: 126/81 (26 Feb 2025 05:10) (124/55 - 151/75)  BP(mean): --  RR: 17 (26 Feb 2025 05:10) (16 - 18)  SpO2: 99% (26 Feb 2025 05:10) (99% - 100%)    Parameters below as of 26 Feb 2025 05:10  Patient On (Oxygen Delivery Method): room air      PHYSICAL EXAM:  GENERAL: NAD, well-developed, well-nourished  HEAD: Atraumatic, Normocephalic  EYES: EOMI, PERRLA, conjunctiva and sclera clear  NECK: Supple, No JVD  CHEST/LUNG: Clear to auscultation bilaterally; No wheezes or crackles  HEART: Normal S1/S2; Regular rate and rhythm; No murmurs, rubs, or gallops  ABDOMEN: Soft, Nontender, Nondistended; Bowel sounds present  EXTREMITIES: 2+ Peripheral Pulses; No clubbing, cyanosis, or edema  PSYCH: A&Ox3  NEUROLOGY: no focal neurologic deficit  SKIN: No rashes or lesions        LABS:                        12.1   5.98  )-----------( 179      ( 25 Feb 2025 04:15 )             37.2      02-25    139  |  94[L]  |  68[H]  ----------------------------<  87  3.8   |  27  |  9.12[H]    Ca    9.3      25 Feb 2025 04:15  Phos  6.1     02-25  Mg     2.80     02-25    TPro  x   /  Alb  x   /  TBili  x   /  DBili  x   /  AST  x   /  ALT  5   /  AlkPhos  x   02-25    PT/INR - ( 24 Feb 2025 11:20 )   PT: 11.0 sec;   INR: 0.92 ratio         PTT - ( 24 Feb 2025 11:20 )  PTT:28.3 sec      Urinalysis Basic - ( 25 Feb 2025 04:15 )    Color: x / Appearance: x / SG: x / pH: x  Gluc: 87 mg/dL / Ketone: x  / Bili: x / Urobili: x   Blood: x / Protein: x / Nitrite: x   Leuk Esterase: x / RBC: x / WBC x   Sq Epi: x / Non Sq Epi: x / Bacteria: x        RADIOLOGY & ADDITIONAL TESTS:    Imaging Personally Reviewed:    Consultant(s) Notes Reviewed:      Care Discussed with Consultants/Other Providers:   Patient is a 79y old  Male who presents with a chief complaint of Near syncope (25 Feb 2025 17:21)      SUBJECTIVE / OVERNIGHT EVENTS: Several runs of wide complex on tele, rate 70s, not consistent with VT, suspect intermittent fasicular conduction delay. Feels well this morning no acute complaints. Denies fever, chills chest pain, SOB, palpitations, abd pain, n/v/d.      MEDICATIONS  (STANDING):  aspirin enteric coated 81 milliGRAM(s) Oral daily  calcium acetate 667 milliGRAM(s) Oral three times a day with meals  chlorhexidine 2% Cloths 1 Application(s) Topical daily  cloNIDine 0.2 milliGRAM(s) Oral two times a day  dextrose 5%. 1000 milliLiter(s) (50 mL/Hr) IV Continuous <Continuous>  dextrose 5%. 1000 milliLiter(s) (100 mL/Hr) IV Continuous <Continuous>  dextrose 50% Injectable 25 Gram(s) IV Push once  dextrose 50% Injectable 12.5 Gram(s) IV Push once  dextrose 50% Injectable 25 Gram(s) IV Push once  glucagon  Injectable 1 milliGRAM(s) IntraMuscular once  heparin   Injectable 5000 Unit(s) SubCutaneous every 8 hours  influenza  Vaccine (HIGH DOSE) 0.5 milliLiter(s) IntraMuscular once  insulin lispro (ADMELOG) corrective regimen sliding scale   SubCutaneous three times a day before meals  insulin lispro (ADMELOG) corrective regimen sliding scale   SubCutaneous at bedtime  NIFEdipine XL 90 milliGRAM(s) Oral daily  tamsulosin 0.4 milliGRAM(s) Oral at bedtime  traZODone 100 milliGRAM(s) Oral at bedtime    MEDICATIONS  (PRN):  dextrose Oral Gel 15 Gram(s) Oral once PRN Blood Glucose LESS THAN 70 milliGRAM(s)/deciliter  sodium chloride 0.9% Bolus. 100 milliLiter(s) IV Bolus every 5 minutes PRN SBP LESS THAN or EQUAL to 80 mmHg      CAPILLARY BLOOD GLUCOSE      POCT Blood Glucose.: 105 mg/dL (25 Feb 2025 19:27)  POCT Blood Glucose.: 147 mg/dL (25 Feb 2025 15:20)  POCT Blood Glucose.: 114 mg/dL (25 Feb 2025 12:15)  POCT Blood Glucose.: 122 mg/dL (25 Feb 2025 08:28)    I&O's Summary    25 Feb 2025 07:01  -  26 Feb 2025 07:00  --------------------------------------------------------  IN: 400 mL / OUT: 400 mL / NET: 0 mL        Vital Signs Last 24 Hrs  T(C): 36.7 (26 Feb 2025 05:10), Max: 36.9 (25 Feb 2025 17:15)  T(F): 98.1 (26 Feb 2025 05:10), Max: 98.4 (25 Feb 2025 17:15)  HR: 80 (26 Feb 2025 05:10) (67 - 80)  BP: 126/81 (26 Feb 2025 05:10) (124/55 - 151/75)  BP(mean): --  RR: 17 (26 Feb 2025 05:10) (16 - 18)  SpO2: 99% (26 Feb 2025 05:10) (99% - 100%)    Parameters below as of 26 Feb 2025 05:10  Patient On (Oxygen Delivery Method): room air      PHYSICAL EXAM:  GENERAL: NAD, well-developed, well-nourished  HEAD: Atraumatic, Normocephalic  EYES: EOMI, PERRLA, conjunctiva and sclera clear  NECK: Supple, No JVD  CHEST/LUNG: Clear to auscultation bilaterally; No wheezes or crackles  HEART: Normal S1/S2; Regular rate and rhythm; No murmurs, rubs, or gallops  ABDOMEN: Soft, Nontender, Nondistended; Bowel sounds present  EXTREMITIES: 2+ Peripheral Pulses; No clubbing, cyanosis, or edema  PSYCH: A&Ox3  NEUROLOGY: no focal neurologic deficit  SKIN: No rashes or lesions        LABS:                        12.1   5.98  )-----------( 179      ( 25 Feb 2025 04:15 )             37.2      02-25    139  |  94[L]  |  68[H]  ----------------------------<  87  3.8   |  27  |  9.12[H]    Ca    9.3      25 Feb 2025 04:15  Phos  6.1     02-25  Mg     2.80     02-25    TPro  x   /  Alb  x   /  TBili  x   /  DBili  x   /  AST  x   /  ALT  5   /  AlkPhos  x   02-25    PT/INR - ( 24 Feb 2025 11:20 )   PT: 11.0 sec;   INR: 0.92 ratio         PTT - ( 24 Feb 2025 11:20 )  PTT:28.3 sec      Urinalysis Basic - ( 25 Feb 2025 04:15 )    Color: x / Appearance: x / SG: x / pH: x  Gluc: 87 mg/dL / Ketone: x  / Bili: x / Urobili: x   Blood: x / Protein: x / Nitrite: x   Leuk Esterase: x / RBC: x / WBC x   Sq Epi: x / Non Sq Epi: x / Bacteria: x        RADIOLOGY & ADDITIONAL TESTS:    Imaging Personally Reviewed:    Consultant(s) Notes Reviewed:      Care Discussed with Consultants/Other Providers:

## 2025-05-24 NOTE — ED PROVIDER NOTE - ENMT, MLM
[FreeTextEntry1] : Joni Quinones MD [address], [address] [place] [address] [phone]  Dear Dr. Quinones,  Reason for Visit: BPH. Previous microscopic hematuria.   This is a 79 year-old gentleman with previous microscopic hematuria and symptoms of BPH. His hematuria evaluation in 2015 was unremarkable. His renal ultrasound in May 2022 was unremarkable. The patient returns today for follow up. Since he was last seen, the patient reports taking Flomax BID and Proscar regularly without any side effects or difficulties with the medication. He reports improvement to his urinary symptoms with medical therapy. He denies any gross hematuria or urinary incontinence. All other review of systems are negative. Past medical history, family history and social history were inquired and were noncontributory to current condition. Medications and allergies were reviewed. He has no known allergies to medication.    On examination, the patient is a healthy-appearing gentleman in no acute distress. He is alert and oriented follows commands. He has normal mood and affect. He is normocephalic. Neck is supple. Oral no thrush Respirations are unlabored. His abdomen is soft and nontender. Bladder is nonpalpable. No CVA tenderness. Neurologically he is grossly intact. No peripheral edema. Skin without gross abnormality.    His BMP in February 2025 demonstrated normal renal functions, creatinine 0.92. His PSA was 1.35, which is within normal limits. His urinalysis was unremarkable. His urine cytology was negative for high grade urothelial carcinoma.    ASSESSMENT: BPH. Previous microscopic hematuria.    I counseled the patient. In terms of his BPH, the patient reported stable symptoms on medical therapy. I recommended that he continue to take Flomax and Proscar. I renewed the patient's prescription for Proscar and Flomax BID today. I encouraged the patient to continue medications regularly as directed. His PSA is stable and normal. In terms of his previous microscopic hematuria, his previous urine labs were negative. I reassured the patient. Risks and alternatives were discussed. I answered the patient questions. The patient will follow-up as directed and will contact me with any questions or concerns. Thank you for the opportunity to participate in the care of this patient, I will keep you updated on his progress.    Plan: Continue Proscar and Flomax BID. Follow-up in 1 year.  I spent 30-minutes time today on all issues related to this patient on today date of service including non face to face time. 
[FreeTextEntry1] : Joni Quinones MD [address], [place] [address] [place] [address] [phone]  Dear Dr. Quinones,  Reason for Visit: BPH. Previous microscopic hematuria.   This is a 79 year-old gentleman with previous microscopic hematuria and symptoms of BPH. His hematuria evaluation in 2015 was unremarkable. His renal ultrasound in May 2022 was unremarkable. The patient returns today for follow up. Since he was last seen, the patient reports taking Flomax BID and Proscar regularly without any side effects or difficulties with the medication. He reports improvement to his urinary symptoms with medical therapy. He denies any gross hematuria or urinary incontinence. All other review of systems are negative. Past medical history, family history and social history were inquired and were noncontributory to current condition. Medications and allergies were reviewed. He has no known allergies to medication.    On examination, the patient is a healthy-appearing gentleman in no acute distress. He is alert and oriented follows commands. He has normal mood and affect. He is normocephalic. Neck is supple. Oral no thrush Respirations are unlabored. His abdomen is soft and nontender. Bladder is nonpalpable. No CVA tenderness. Neurologically he is grossly intact. No peripheral edema. Skin without gross abnormality.    His BMP in February 2025 demonstrated normal renal functions, creatinine 0.92. His PSA was 1.35, which is within normal limits. His urinalysis was unremarkable. His urine cytology was negative for high grade urothelial carcinoma.    ASSESSMENT: BPH. Previous microscopic hematuria.    I counseled the patient. In terms of his BPH, the patient reported stable symptoms on medical therapy. I recommended that he continue to take Flomax and Proscar. I renewed the patient's prescription for Proscar and Flomax BID today. I encouraged the patient to continue medications regularly as directed. His PSA is stable and normal. In terms of his previous microscopic hematuria, his previous urine labs were negative. I reassured the patient. Risks and alternatives were discussed. I answered the patient questions. The patient will follow-up as directed and will contact me with any questions or concerns. Thank you for the opportunity to participate in the care of this patient, I will keep you updated on his progress.    Plan: Continue Proscar and Flomax BID. Follow-up in 1 year.  I spent 30-minutes time today on all issues related to this patient on today date of service including non face to face time. 
Airway patent, Nasal mucosa clear. Mouth with normal mucosa. Throat has no vesicles, no oropharyngeal exudates and uvula is midline.

## (undated) DEVICE — DRSG BANDAID 0.75X3"

## (undated) DEVICE — TUBING MEDI-VAC W MAXIGRIP CONNECTORS 1/4"X6'

## (undated) DEVICE — DRSG 2X2

## (undated) DEVICE — BIOPSY FORCEP COLD DISP

## (undated) DEVICE — SALIVA EJECTOR (BLUE)

## (undated) DEVICE — ELCTR ECG CONDUCTIVE ADHESIVE

## (undated) DEVICE — CONTAINER FORMALIN 80ML YELLOW

## (undated) DEVICE — GOWN LG

## (undated) DEVICE — DRSG CURITY GAUZE SPONGE 4 X 4" 12-PLY NON-STERILE

## (undated) DEVICE — ELCTR GROUNDING PAD ADULT COVIDIEN

## (undated) DEVICE — LINE BREATHE SAMPLNG

## (undated) DEVICE — TUBING IV SET GRAVITY 3Y 100" MACRO

## (undated) DEVICE — BIOPSY FORCEP RADIAL JAW 4 STANDARD WITH NEEDLE

## (undated) DEVICE — LUBRICATING JELLY HR ONE SHOT 3G

## (undated) DEVICE — PACK IV START WITH CHG

## (undated) DEVICE — CATH IV SAFE BC 22G X 1" (BLUE)

## (undated) DEVICE — TUBING SUCTION NONCONDUCTIVE 6MM X 12FT

## (undated) DEVICE — BASIN EMESIS 10IN GRADUATED MAUVE